# Patient Record
Sex: MALE | Race: WHITE | Employment: FULL TIME | URBAN - METROPOLITAN AREA
[De-identification: names, ages, dates, MRNs, and addresses within clinical notes are randomized per-mention and may not be internally consistent; named-entity substitution may affect disease eponyms.]

---

## 2017-08-31 ENCOUNTER — LABORATORY ENCOUNTER (OUTPATIENT)
Dept: LAB | Age: 37
End: 2017-08-31
Attending: INTERNAL MEDICINE
Payer: COMMERCIAL

## 2017-08-31 DIAGNOSIS — E10.9 DIABETES MELLITUS TYPE I (HCC): Primary | ICD-10-CM

## 2017-08-31 LAB
ALBUMIN SERPL-MCNC: 4.1 G/DL (ref 3.5–4.8)
ALP LIVER SERPL-CCNC: 80 U/L (ref 45–117)
ALT SERPL-CCNC: 23 U/L (ref 17–63)
AST SERPL-CCNC: 9 U/L (ref 15–41)
BASOPHILS # BLD AUTO: 0.05 X10(3) UL (ref 0–0.1)
BASOPHILS NFR BLD AUTO: 1.2 %
BILIRUB SERPL-MCNC: 0.7 MG/DL (ref 0.1–2)
BUN BLD-MCNC: 18 MG/DL (ref 8–20)
CALCIUM BLD-MCNC: 9.1 MG/DL (ref 8.3–10.3)
CHLORIDE: 105 MMOL/L (ref 101–111)
CHOLEST SMN-MCNC: 158 MG/DL (ref ?–200)
CO2: 26 MMOL/L (ref 22–32)
CREAT BLD-MCNC: 1 MG/DL (ref 0.7–1.3)
EOSINOPHIL # BLD AUTO: 0.12 X10(3) UL (ref 0–0.3)
EOSINOPHIL NFR BLD AUTO: 2.9 %
ERYTHROCYTE [DISTWIDTH] IN BLOOD BY AUTOMATED COUNT: 11.7 % (ref 11.5–16)
GLUCOSE BLD-MCNC: 258 MG/DL (ref 70–99)
HCT VFR BLD AUTO: 43.2 % (ref 37–53)
HDLC SERPL-MCNC: 60 MG/DL (ref 45–?)
HDLC SERPL: 2.63 {RATIO} (ref ?–4.97)
HGB BLD-MCNC: 15 G/DL (ref 13–17)
IMMATURE GRANULOCYTE COUNT: 0 X10(3) UL (ref 0–1)
IMMATURE GRANULOCYTE RATIO %: 0 %
LDLC SERPL CALC-MCNC: 73 MG/DL (ref ?–130)
LDLC SERPL-MCNC: 25 MG/DL (ref 5–40)
LYMPHOCYTES # BLD AUTO: 1.49 X10(3) UL (ref 0.9–4)
LYMPHOCYTES NFR BLD AUTO: 36.2 %
M PROTEIN MFR SERPL ELPH: 7.3 G/DL (ref 6.1–8.3)
MCH RBC QN AUTO: 32.2 PG (ref 27–33.2)
MCHC RBC AUTO-ENTMCNC: 34.7 G/DL (ref 31–37)
MCV RBC AUTO: 92.7 FL (ref 80–99)
MONOCYTES # BLD AUTO: 0.48 X10(3) UL (ref 0.1–0.6)
MONOCYTES NFR BLD AUTO: 11.7 %
NEUTROPHIL ABS PRELIM: 1.98 X10 (3) UL (ref 1.3–6.7)
NEUTROPHILS # BLD AUTO: 1.98 X10(3) UL (ref 1.3–6.7)
NEUTROPHILS NFR BLD AUTO: 48 %
NONHDLC SERPL-MCNC: 98 MG/DL (ref ?–130)
PLATELET # BLD AUTO: 189 10(3)UL (ref 150–450)
POTASSIUM SERPL-SCNC: 4.2 MMOL/L (ref 3.6–5.1)
RBC # BLD AUTO: 4.66 X10(6)UL (ref 4.3–5.7)
RED CELL DISTRIBUTION WIDTH-SD: 39.8 FL (ref 35.1–46.3)
SODIUM SERPL-SCNC: 139 MMOL/L (ref 136–144)
TRIGLYCERIDES: 126 MG/DL (ref ?–150)
WBC # BLD AUTO: 4.1 X10(3) UL (ref 4–13)

## 2017-08-31 PROCEDURE — 36415 COLL VENOUS BLD VENIPUNCTURE: CPT

## 2017-08-31 PROCEDURE — 84681 ASSAY OF C-PEPTIDE: CPT

## 2017-08-31 PROCEDURE — 83516 IMMUNOASSAY NONANTIBODY: CPT

## 2017-08-31 PROCEDURE — 85025 COMPLETE CBC W/AUTO DIFF WBC: CPT

## 2017-08-31 PROCEDURE — 80061 LIPID PANEL: CPT

## 2017-08-31 PROCEDURE — 80053 COMPREHEN METABOLIC PANEL: CPT

## 2017-08-31 PROCEDURE — 86341 ISLET CELL ANTIBODY: CPT

## 2017-09-02 LAB
C-PEPTIDE, SERUM OR PLASMA: 0.4 NG/ML
GLUTAMIC ACID DECARBOXYLASE AB: >250 IU/ML

## 2017-09-05 LAB — ISLET ANTIGEN-2 ANTIBODY: <0.8 U/ML

## 2018-07-06 ENCOUNTER — APPOINTMENT (OUTPATIENT)
Dept: GENERAL RADIOLOGY | Facility: HOSPITAL | Age: 38
End: 2018-07-06
Attending: EMERGENCY MEDICINE
Payer: COMMERCIAL

## 2018-07-06 ENCOUNTER — HOSPITAL ENCOUNTER (EMERGENCY)
Facility: HOSPITAL | Age: 38
Discharge: HOME | End: 2018-07-06
Attending: EMERGENCY MEDICINE
Payer: COMMERCIAL

## 2018-07-06 VITALS
DIASTOLIC BLOOD PRESSURE: 77 MMHG | TEMPERATURE: 99 F | RESPIRATION RATE: 20 BRPM | BODY MASS INDEX: 22.35 KG/M2 | OXYGEN SATURATION: 98 % | HEIGHT: 72 IN | WEIGHT: 165 LBS | HEART RATE: 77 BPM | SYSTOLIC BLOOD PRESSURE: 127 MMHG

## 2018-07-06 DIAGNOSIS — S83.92XA SPRAIN OF LEFT KNEE, UNSPECIFIED LIGAMENT, INITIAL ENCOUNTER: Primary | ICD-10-CM

## 2018-07-06 DIAGNOSIS — M25.462 KNEE EFFUSION, LEFT: ICD-10-CM

## 2018-07-06 DIAGNOSIS — S93.402A MODERATE LEFT ANKLE SPRAIN, INITIAL ENCOUNTER: ICD-10-CM

## 2018-07-06 PROCEDURE — 99283 EMERGENCY DEPT VISIT LOW MDM: CPT

## 2018-07-06 PROCEDURE — 73610 X-RAY EXAM OF ANKLE: CPT | Performed by: EMERGENCY MEDICINE

## 2018-07-06 PROCEDURE — 73560 X-RAY EXAM OF KNEE 1 OR 2: CPT | Performed by: EMERGENCY MEDICINE

## 2018-07-06 RX ORDER — IBUPROFEN 600 MG/1
600 TABLET ORAL ONCE
Status: COMPLETED | OUTPATIENT
Start: 2018-07-06 | End: 2018-07-06

## 2018-07-06 RX ORDER — LORAZEPAM 1 MG/1
1 TABLET ORAL 2 TIMES DAILY
COMMUNITY
End: 2019-04-26

## 2018-07-06 RX ORDER — HYDROCODONE BITARTRATE AND ACETAMINOPHEN 5; 325 MG/1; MG/1
1-2 TABLET ORAL EVERY 4 HOURS PRN
Qty: 10 TABLET | Refills: 0 | Status: SHIPPED | OUTPATIENT
Start: 2018-07-06 | End: 2018-07-13

## 2018-07-06 RX ORDER — HYDROCODONE BITARTRATE AND ACETAMINOPHEN 5; 325 MG/1; MG/1
1 TABLET ORAL ONCE
Status: COMPLETED | OUTPATIENT
Start: 2018-07-06 | End: 2018-07-06

## 2018-07-06 RX ORDER — INSULIN LISPRO 100 [IU]/ML
4 INJECTION, SOLUTION INTRAVENOUS; SUBCUTANEOUS
COMMUNITY

## 2018-07-06 RX ORDER — FOLIC ACID 1 MG/1
TABLET ORAL DAILY
COMMUNITY

## 2018-07-06 RX ORDER — THIAMINE MONONITRATE (VIT B1) 100 MG
100 TABLET ORAL DAILY
COMMUNITY

## 2018-07-06 NOTE — ED NOTES
Patient fitted with knee immobilizer and stirrup splint to left knee/ankle. Crutch fitting and teaching completed. Pt tolerated well.

## 2018-07-06 NOTE — ED PROVIDER NOTES
Patient Seen in: [facility] Emergency Department    History   Patient presents with:  Lower Extremity Injury (musculoskeletal)    Stated Complaint: Lower ext injury    HPI    Patient is a 61-year-old male who states last night he was carrying his 6- lateral tenderness. Tib-fib nontender however there is tenderness particularly lateral malleolus with swelling noted. No crepitus. Foot nontender. Good dorsalis pedis pulse. Able to dorsiflex and plantarflex. Sensation intact light touch.   SKIN: No r

## 2018-07-09 PROBLEM — M23.92 INTERNAL DERANGEMENT OF LEFT KNEE: Status: ACTIVE | Noted: 2018-07-09

## 2018-07-09 PROBLEM — S93.402A MODERATE LEFT ANKLE SPRAIN: Status: ACTIVE | Noted: 2018-07-09

## 2020-01-01 ENCOUNTER — HOSPITAL ENCOUNTER (OUTPATIENT)
Age: 40
Discharge: STILL ADMITTED OR ADMINISTRATIVE | End: 2020-01-01
Attending: EMERGENCY MEDICINE
Payer: COMMERCIAL

## 2021-03-03 PROBLEM — F41.9 ANXIETY: Status: ACTIVE | Noted: 2021-03-03

## 2021-03-03 PROBLEM — E10.65 UNCONTROLLED TYPE 1 DIABETES MELLITUS WITH HYPERGLYCEMIA (HCC): Status: ACTIVE | Noted: 2021-03-03

## 2022-08-03 NOTE — LETTER
Patient Education     Checking for Skin Cancer  You can find cancer early by checking your skin each month. There are 3 kinds of skin cancer. They are melanoma, basal cell carcinoma, and squamous cell carcinoma. Doing monthly skin checks is the best way to find new marks or skin changes. Follow the instructions below for checking your skin.   The ABCDEs of checking moles for melanoma   Check your moles or growths for signs of melanoma using ABCDE:   Asymmetry: the sides of the mole or growth don t match  Border: the edges are ragged, notched, or blurred  Color: the color within the mole or growth varies  Diameter: the mole or growth is larger than 6 mm (size of a pencil eraser)  Evolving: the size, shape, or color of the mole or growth is changing (evolving is not shown in the images below)    Checking for other types of skin cancer  Basal cell carcinoma or squamous cell carcinoma have symptoms such as:     A spot or mole that looks different from all other marks on your skin  Changes in how an area feels, such as itching, tenderness, or pain  Changes in the skin's surface, such as oozing, bleeding, or scaliness  A sore that does not heal  New swelling or redness beyond the border of a mole    Who s at risk?  Anyone can get skin cancer. But you are at greater risk if you have:   Fair skin, light-colored hair, or light-colored eyes  Many moles or abnormal moles on your skin  A history of sunburns from sunlight or tanning beds  A family history of skin cancer  A history of exposure to radiation or chemicals  A weakened immune system  If you have had skin cancer in the past, you are at risk for recurring skin cancer.   How to check your skin  Do your monthly skin checkups in front of a full-length mirror. Check all parts of your body, including your:   Head (ears, face, neck, and scalp)  Torso (front, back, and sides)  Arms (tops, undersides, upper, and lower armpits)  Hands (palms, backs, and fingers, including  [address]  Authorization for Surgical Operation and Procedure                                                                                           1. I hereby Josee Crawley MD, my physician and his/her assistants (if applicable), which may include medical students, residents, and/or fellows, to perform the following surgical operation/ procedure and administer such anesthesia as may be determined necessary by my physician: Operation/Procedure name (s) ESOPHAGOGASTRODUODENOSCOPY (EGD) on [address]   2. I recognize that during the surgical operation/procedure, unforeseen conditions may necessitate additional or different procedures than those listed above. I, therefore, further authorize and request that the above-named surgeon, assistants, or designees perform such procedures as are, in their judgment, necessary and desirable. 3.   My surgeon/physician has discussed prior to my surgery the potential benefits, risks and side effects of this procedure; the likelihood of achieving goals; and potential problems that might occur during recuperation. They also discussed reasonable alternatives to the procedure, including risks, benefits, and side effects related to the alternatives and risks related to not receiving this procedure. I have had all my questions answered and I acknowledge that no guarantee has been made as to the result that may be obtained. 4.   Should the need arise during my operation/procedure, which includes change of level of care prior to discharge, I also consent to the administration of blood and/or blood products. Further, I understand that despite careful testing and screening of blood or blood products by collecting agencies, I may still be subject to ill effects as a result of receiving a blood transfusion and/or blood products.   The following are some, but not all, of the potential risks that can occur: fever and allergic reactions, hemolytic reactions, transmission of diseases such as Hepatitis, AIDS and Cytomegalovirus (CMV) and fluid overload. In the event that I wish to have an autologous transfusion of my own blood, or a directed donor transfusion, I will discuss this with my physician. Check only if Refusing Blood or Blood Products  I understand refusal of blood or blood products as deemed necessary by my physician may have serious consequences to my condition to include possible death. I hereby assume responsibility for my refusal and release the hospital, its personnel, and my physicians from any responsibility for the consequences of my refusal.           ____ Refuse      5. I authorize the use of any specimen, organs, tissues, body parts or foreign objects that may be removed from my body during the operation/procedure for diagnosis, research or teaching purposes and their subsequent disposal by hospital authorities. I also authorize the release of specimen test results and/or written reports to my treating physician on the hospital medical staff or other referring or consulting physicians involved in my care, at the discretion of the Pathologist or my treating physician. 6.   I consent to the photographing or videotaping of the operations or procedures to be performed, including appropriate portions of my body for medical, scientific, or educational purposes, provided my identity is not revealed by the pictures or by descriptive texts accompanying them. If the procedure has been photographed/videotaped, the surgeon will obtain the original picture, image, videotape or CD. The hospital will not be responsible for storage, release or maintenance of the picture, image, tape or CD.    7.   I consent to the presence of a  or observers in the operating room as deemed necessary by my physician or their designees.     8.   I recognize that in the event my procedure results in extended under the nails)  Buttocks and genitals  Legs (front, back, and sides)  Feet (tops, soles, toes, including under the nails, and between toes)  If you have a lot of moles, take digital photos of them each month. Make sure to take photos both up close and from a distance. These can help you see if any moles change over time.   Most skin changes are not cancer. But if you see any changes in your skin, call your doctor right away. Only he or she can diagnose a problem. If you have skin cancer, seeing your doctor can be the first step toward getting the treatment that could save your life.   [employer] last reviewed this educational content on 4/1/2019 2000-2020 [employer]. [address] [place] [address], [place] [address]. All rights reserved. This information is not intended as a substitute for professional medical care. Always follow your healthcare professional's instructions.       When should I call my doctor?  If you are worsening or not improving, please, contact us or seek urgent care as noted below.     Who should I call with questions (adults)?  [facility] (adult and pediatric): [phone]  [facility] (adult): [phone]  For urgent needs outside of business hours call the [facility] at [phone] and ask for the dermatology resident on call to be paged  If this is a medical emergency and you are unable to reach an ER, Call [phone]    Who should I call with questions (pediatric)?  [facility]- Pediatric Dermatology  Dr. Ju Aponte, Dr. Autumn Canchola, Dr. Chandni Villatoro, NEDA Mason, Dr. Erika Barker, Dr. Zoë Lott & Dr. Lonnie Cabrera  Non-urgent nurse triage line; [phone]- Dominique and Afshan SAGE Care Coordinatoraide Dominguez (/Complex ) [phone]    If you need a prescription refill, please contact your pharmacy. Refills are approved or denied by our  Physicians during normal business hours, Monday through Fridays  Per office policy, refills will not be granted if you have not been seen within the past year (or sooner depending on your child's condition)    Scheduling Information:  Pediatric Appointment Scheduling and Call Center [phone]  Radiology Scheduling- [phone]  Sedation Unit Scheduling- [phone]  [place] Scheduling- General [phone]; Pediatric Dermatology [phone]  Main  Services: [phone]  Irish: [phone]  [place]er: [phone]  Hmong/Bhutanese/Frisian: [phone]  Preadmission Nursing Department Fax Number: [phone] (Fax all pre-operative paperwork to this number)    For urgent matters arising during evenings, weekends, or holidays that cannot wait for normal business hours please call [phone] and ask for the dermatology resident on call to be paged.           X-Ray/fluoroscopy time, I may develop a skin reaction. 9. If I have a Do Not Attempt Resuscitation (DNAR) order in place, that status will be suspended while in the operating room, procedural suite, and during the recovery period unless otherwise explicitly stated by me (or a person authorized to consent on my behalf). The surgeon or my attending physician will determine when the applicable recovery period ends for purposes of reinstating the DNAR order. 10. Patients having a sterilization procedure: I understand that if the procedure is successful the results will be permanent and it will therefore be impossible for me to inseminate, conceive, or bear children. I also understand that the procedure is intended to result in sterility, although the result has not been guaranteed. 11. I acknowledge that my physician has explained sedation/analgesia administration to me including the risk and benefits I consent to the administration of sedation/analgesia as may be necessary or desirable in the judgment of my physician. I CERTIFY THAT I HAVE READ AND FULLY UNDERSTAND THE ABOVE CONSENT TO OPERATION and/or OTHER PROCEDURE.     _________________________________________ _________________________________     ___________________________________  Signature of Patient     Signature of Responsible Person                   Printed Name of Responsible Person                              _________________________________________ ______________________________        ___________________________________  Signature of Witness         Date  Time         Relationship to Patient    STATEMENT OF PHYSICIAN My signature below affirms that prior to the time of the procedure; I have explained to the patient and/or his/her legal representative, the risks and benefits involved in the proposed treatment and any reasonable alternative to the proposed treatment.  I have also explained the risks and benefits involved in refusal of the proposed treatment and alternatives to the proposed treatment and have answered the patient's questions.  If I have a significant financial interest in a co-management agreement or a significant financial interest in any product or implant, or other significant relationship used in this procedure/surgery, I have disclosed this and had a discussion with my patient.     _______________________________________________________________ _____________________________  Ana Paula Cortes of Physician)                                                                                         (Date)                                   (Time)  Patient Name: Chao Carter    : 10/19/1980   Printed: 2022      Medical Record #: [id number]                                              Page 1 of 1

## 2022-11-05 ENCOUNTER — APPOINTMENT (OUTPATIENT)
Dept: CT IMAGING | Facility: HOSPITAL | Age: 42
End: 2022-11-05
Attending: EMERGENCY MEDICINE
Payer: COMMERCIAL

## 2022-11-05 ENCOUNTER — HOSPITAL ENCOUNTER (INPATIENT)
Facility: HOSPITAL | Age: 42
LOS: 2 days | Discharge: HOME | End: 2022-11-07
Attending: EMERGENCY MEDICINE | Admitting: INTERNAL MEDICINE
Payer: COMMERCIAL

## 2022-11-05 ENCOUNTER — HOSPITAL ENCOUNTER (OUTPATIENT)
Facility: HOSPITAL | Age: 42
Setting detail: OBSERVATION
Discharge: HOME | DRG: 392 | End: 2022-11-07
Attending: EMERGENCY MEDICINE | Admitting: INTERNAL MEDICINE
Payer: COMMERCIAL

## 2022-11-05 ENCOUNTER — APPOINTMENT (OUTPATIENT)
Dept: CT IMAGING | Facility: HOSPITAL | Age: 42
DRG: 392 | End: 2022-11-05
Attending: EMERGENCY MEDICINE
Payer: COMMERCIAL

## 2022-11-05 DIAGNOSIS — R10.9 INTRACTABLE ABDOMINAL PAIN: Primary | ICD-10-CM

## 2022-11-05 LAB
ALBUMIN SERPL-MCNC: 4.3 G/DL (ref 3.4–5)
ALBUMIN/GLOB SERPL: 1.3 {RATIO} (ref 1–2)
ALP LIVER SERPL-CCNC: 74 U/L
ALT SERPL-CCNC: 30 U/L
ANION GAP SERPL CALC-SCNC: 14 MMOL/L (ref 0–18)
AST SERPL-CCNC: 29 U/L (ref 15–37)
BASOPHILS # BLD AUTO: 0.06 X10(3) UL (ref 0–0.2)
BASOPHILS NFR BLD AUTO: 0.8 %
BILIRUB SERPL-MCNC: 0.5 MG/DL (ref 0.1–2)
BUN BLD-MCNC: 13 MG/DL (ref 7–18)
BUN/CREAT SERPL: 14 (ref 10–20)
CALCIUM BLD-MCNC: 9.4 MG/DL (ref 8.5–10.1)
CHLORIDE SERPL-SCNC: 104 MMOL/L (ref 98–112)
CO2 SERPL-SCNC: 23 MMOL/L (ref 21–32)
CREAT BLD-MCNC: 0.93 MG/DL
DEPRECATED RDW RBC AUTO: 39.1 FL (ref 35.1–46.3)
EOSINOPHIL # BLD AUTO: 0.16 X10(3) UL (ref 0–0.7)
EOSINOPHIL NFR BLD AUTO: 2.1 %
ERYTHROCYTE [DISTWIDTH] IN BLOOD BY AUTOMATED COUNT: 11.6 % (ref 11–15)
GFR SERPLBLD BASED ON 1.73 SQ M-ARVRAT: 106 ML/MIN/1.73M2 (ref 60–?)
GLOBULIN PLAS-MCNC: 3.3 G/DL (ref 2.8–4.4)
GLUCOSE BLD-MCNC: 119 MG/DL (ref 70–99)
GLUCOSE BLDC GLUCOMTR-MCNC: 191 MG/DL (ref 70–99)
GLUCOSE BLDC GLUCOMTR-MCNC: 220 MG/DL (ref 70–99)
HCT VFR BLD AUTO: 43.7 %
HGB BLD-MCNC: 15 G/DL
IMM GRANULOCYTES # BLD AUTO: 0.01 X10(3) UL (ref 0–1)
IMM GRANULOCYTES NFR BLD: 0.1 %
LACTATE SERPL-SCNC: 1.3 MMOL/L (ref 0.4–2)
LIPASE SERPL-CCNC: 133 U/L (ref 73–393)
LYMPHOCYTES # BLD AUTO: 1.67 X10(3) UL (ref 1–4)
LYMPHOCYTES NFR BLD AUTO: 22.2 %
MAGNESIUM SERPL-MCNC: 1.8 MG/DL (ref 1.6–2.6)
MCH RBC QN AUTO: 31.3 PG (ref 26–34)
MCHC RBC AUTO-ENTMCNC: 34.3 G/DL (ref 31–37)
MCV RBC AUTO: 91.2 FL
MONOCYTES # BLD AUTO: 0.71 X10(3) UL (ref 0.1–1)
MONOCYTES NFR BLD AUTO: 9.5 %
NEUTROPHILS # BLD AUTO: 4.9 X10 (3) UL (ref 1.5–7.7)
NEUTROPHILS # BLD AUTO: 4.9 X10(3) UL (ref 1.5–7.7)
NEUTROPHILS NFR BLD AUTO: 65.3 %
OSMOLALITY SERPL CALC.SUM OF ELEC: 293 MOSM/KG (ref 275–295)
PLATELET # BLD AUTO: 289 10(3)UL (ref 150–450)
POTASSIUM SERPL-SCNC: 3.4 MMOL/L (ref 3.5–5.1)
PROT SERPL-MCNC: 7.6 G/DL (ref 6.4–8.2)
RBC # BLD AUTO: 4.79 X10(6)UL
SARS-COV-2 RNA RESP QL NAA+PROBE: NOT DETECTED
SODIUM SERPL-SCNC: 141 MMOL/L (ref 136–145)
WBC # BLD AUTO: 7.5 X10(3) UL (ref 4–11)

## 2022-11-05 PROCEDURE — 74177 CT ABD & PELVIS W/CONTRAST: CPT | Performed by: EMERGENCY MEDICINE

## 2022-11-05 PROCEDURE — 99223 1ST HOSP IP/OBS HIGH 75: CPT | Performed by: INTERNAL MEDICINE

## 2022-11-05 RX ORDER — MAGNESIUM OXIDE 400 MG/1
400 TABLET ORAL ONCE
Status: COMPLETED | OUTPATIENT
Start: 2022-11-05 | End: 2022-11-05

## 2022-11-05 RX ORDER — SODIUM CHLORIDE, SODIUM LACTATE, POTASSIUM CHLORIDE, CALCIUM CHLORIDE 600; 310; 30; 20 MG/100ML; MG/100ML; MG/100ML; MG/100ML
INJECTION, SOLUTION INTRAVENOUS CONTINUOUS
Status: DISCONTINUED | OUTPATIENT
Start: 2022-11-05 | End: 2022-11-07

## 2022-11-05 RX ORDER — LORAZEPAM 2 MG/ML
2 INJECTION INTRAMUSCULAR
Status: DISCONTINUED | OUTPATIENT
Start: 2022-11-05 | End: 2022-11-07

## 2022-11-05 RX ORDER — MORPHINE SULFATE 4 MG/ML
6 INJECTION, SOLUTION INTRAMUSCULAR; INTRAVENOUS ONCE
Status: COMPLETED | OUTPATIENT
Start: 2022-11-05 | End: 2022-11-05

## 2022-11-05 RX ORDER — LORAZEPAM 1 MG/1
1 TABLET ORAL
Status: DISCONTINUED | OUTPATIENT
Start: 2022-11-05 | End: 2022-11-07

## 2022-11-05 RX ORDER — LORAZEPAM 1 MG/1
2 TABLET ORAL
Status: DISCONTINUED | OUTPATIENT
Start: 2022-11-05 | End: 2022-11-07

## 2022-11-05 RX ORDER — ACETAMINOPHEN 325 MG/1
650 TABLET ORAL EVERY 4 HOURS PRN
Status: DISCONTINUED | OUTPATIENT
Start: 2022-11-05 | End: 2022-11-07

## 2022-11-05 RX ORDER — HEPARIN SODIUM 5000 [USP'U]/ML
5000 INJECTION, SOLUTION INTRAVENOUS; SUBCUTANEOUS EVERY 8 HOURS SCHEDULED
Status: DISCONTINUED | OUTPATIENT
Start: 2022-11-05 | End: 2022-11-07

## 2022-11-05 RX ORDER — HYDROMORPHONE HYDROCHLORIDE 1 MG/ML
0.8 INJECTION, SOLUTION INTRAMUSCULAR; INTRAVENOUS; SUBCUTANEOUS EVERY 2 HOUR PRN
Status: DISCONTINUED | OUTPATIENT
Start: 2022-11-05 | End: 2022-11-07

## 2022-11-05 RX ORDER — HYDROCODONE BITARTRATE AND ACETAMINOPHEN 5; 325 MG/1; MG/1
2 TABLET ORAL EVERY 4 HOURS PRN
Status: DISCONTINUED | OUTPATIENT
Start: 2022-11-05 | End: 2022-11-07

## 2022-11-05 RX ORDER — HYDROMORPHONE HYDROCHLORIDE 1 MG/ML
0.4 INJECTION, SOLUTION INTRAMUSCULAR; INTRAVENOUS; SUBCUTANEOUS EVERY 2 HOUR PRN
Status: DISCONTINUED | OUTPATIENT
Start: 2022-11-05 | End: 2022-11-07

## 2022-11-05 RX ORDER — LORAZEPAM 1 MG/1
1 TABLET ORAL 2 TIMES DAILY
Status: DISCONTINUED | OUTPATIENT
Start: 2022-11-05 | End: 2022-11-07

## 2022-11-05 RX ORDER — HYDROCODONE BITARTRATE AND ACETAMINOPHEN 5; 325 MG/1; MG/1
1 TABLET ORAL EVERY 4 HOURS PRN
Status: DISCONTINUED | OUTPATIENT
Start: 2022-11-05 | End: 2022-11-07

## 2022-11-05 RX ORDER — NICOTINE POLACRILEX 4 MG
15 LOZENGE BUCCAL
Status: DISCONTINUED | OUTPATIENT
Start: 2022-11-05 | End: 2022-11-07

## 2022-11-05 RX ORDER — MORPHINE SULFATE 4 MG/ML
4 INJECTION, SOLUTION INTRAMUSCULAR; INTRAVENOUS ONCE
Status: COMPLETED | OUTPATIENT
Start: 2022-11-05 | End: 2022-11-05

## 2022-11-05 RX ORDER — NICOTINE POLACRILEX 4 MG
30 LOZENGE BUCCAL
Status: DISCONTINUED | OUTPATIENT
Start: 2022-11-05 | End: 2022-11-07

## 2022-11-05 RX ORDER — ONDANSETRON 2 MG/ML
4 INJECTION INTRAMUSCULAR; INTRAVENOUS EVERY 6 HOURS PRN
Status: DISCONTINUED | OUTPATIENT
Start: 2022-11-05 | End: 2022-11-07

## 2022-11-05 RX ORDER — FAMOTIDINE 10 MG/ML
20 INJECTION, SOLUTION INTRAVENOUS ONCE
Status: COMPLETED | OUTPATIENT
Start: 2022-11-05 | End: 2022-11-05

## 2022-11-05 RX ORDER — LORAZEPAM 2 MG/ML
1 INJECTION INTRAMUSCULAR
Status: DISCONTINUED | OUTPATIENT
Start: 2022-11-05 | End: 2022-11-07

## 2022-11-05 RX ORDER — HYDROMORPHONE HYDROCHLORIDE 1 MG/ML
0.2 INJECTION, SOLUTION INTRAMUSCULAR; INTRAVENOUS; SUBCUTANEOUS EVERY 2 HOUR PRN
Status: DISCONTINUED | OUTPATIENT
Start: 2022-11-05 | End: 2022-11-07

## 2022-11-05 RX ORDER — DEXTROSE MONOHYDRATE 25 G/50ML
50 INJECTION, SOLUTION INTRAVENOUS
Status: DISCONTINUED | OUTPATIENT
Start: 2022-11-05 | End: 2022-11-07

## 2022-11-05 RX ORDER — POTASSIUM CHLORIDE 20 MEQ/1
40 TABLET, EXTENDED RELEASE ORAL ONCE
Status: COMPLETED | OUTPATIENT
Start: 2022-11-05 | End: 2022-11-05

## 2022-11-05 RX ORDER — ONDANSETRON 2 MG/ML
4 INJECTION INTRAMUSCULAR; INTRAVENOUS ONCE
Status: COMPLETED | OUTPATIENT
Start: 2022-11-05 | End: 2022-11-05

## 2022-11-05 NOTE — ED QUICK NOTES
Orders for admission, patient is aware of plan and ready to go upstairs. Any questions, please call ED IAN Hazel  at extension [phone]  Type of Covid test sent:  Rapid pending    Titratable drug(s) infusing:  Rate:See MAR  Left FA 20    LOC at time of transport:  Ax[facility]

## 2022-11-05 NOTE — ED INITIAL ASSESSMENT (HPI)
Pt to ED /w c/o generalized abd pain that started today. Pt states he was cracking open a beer when symptoms began. Pt is axox4. Daily drinker. 2 beers daily weekdays, \"more on the weekends. \"

## 2022-11-06 LAB
ALBUMIN SERPL-MCNC: 3.3 G/DL (ref 3.4–5)
ALBUMIN/GLOB SERPL: 1 {RATIO} (ref 1–2)
ALP LIVER SERPL-CCNC: 62 U/L
ALT SERPL-CCNC: 24 U/L
ANION GAP SERPL CALC-SCNC: 10 MMOL/L (ref 0–18)
AST SERPL-CCNC: 18 U/L (ref 15–37)
BASOPHILS # BLD AUTO: 0.05 X10(3) UL (ref 0–0.2)
BASOPHILS NFR BLD AUTO: 0.4 %
BILIRUB SERPL-MCNC: 0.8 MG/DL (ref 0.1–2)
BUN BLD-MCNC: 19 MG/DL (ref 7–18)
BUN/CREAT SERPL: 17.3 (ref 10–20)
CALCIUM BLD-MCNC: 8.2 MG/DL (ref 8.5–10.1)
CHLORIDE SERPL-SCNC: 102 MMOL/L (ref 98–112)
CO2 SERPL-SCNC: 21 MMOL/L (ref 21–32)
CREAT BLD-MCNC: 1.1 MG/DL
DEPRECATED RDW RBC AUTO: 40.7 FL (ref 35.1–46.3)
EOSINOPHIL # BLD AUTO: 0 X10(3) UL (ref 0–0.7)
EOSINOPHIL NFR BLD AUTO: 0 %
ERYTHROCYTE [DISTWIDTH] IN BLOOD BY AUTOMATED COUNT: 11.9 % (ref 11–15)
GFR SERPLBLD BASED ON 1.73 SQ M-ARVRAT: 86 ML/MIN/1.73M2 (ref 60–?)
GLOBULIN PLAS-MCNC: 3.2 G/DL (ref 2.8–4.4)
GLUCOSE BLD-MCNC: 344 MG/DL (ref 70–99)
GLUCOSE BLDC GLUCOMTR-MCNC: 250 MG/DL (ref 70–99)
GLUCOSE BLDC GLUCOMTR-MCNC: 269 MG/DL (ref 70–99)
GLUCOSE BLDC GLUCOMTR-MCNC: 294 MG/DL (ref 70–99)
GLUCOSE BLDC GLUCOMTR-MCNC: 320 MG/DL (ref 70–99)
GLUCOSE BLDC GLUCOMTR-MCNC: 337 MG/DL (ref 70–99)
HCT VFR BLD AUTO: 48.3 %
HGB BLD-MCNC: 16.6 G/DL
IMM GRANULOCYTES # BLD AUTO: 0.06 X10(3) UL (ref 0–1)
IMM GRANULOCYTES NFR BLD: 0.4 %
INR BLD: 1.07 (ref 0.85–1.16)
LYMPHOCYTES # BLD AUTO: 0.82 X10(3) UL (ref 1–4)
LYMPHOCYTES NFR BLD AUTO: 6 %
MAGNESIUM SERPL-MCNC: 2.1 MG/DL (ref 1.6–2.6)
MCH RBC QN AUTO: 31.7 PG (ref 26–34)
MCHC RBC AUTO-ENTMCNC: 34.4 G/DL (ref 31–37)
MCV RBC AUTO: 92.4 FL
MONOCYTES # BLD AUTO: 0.99 X10(3) UL (ref 0.1–1)
MONOCYTES NFR BLD AUTO: 7.3 %
NEUTROPHILS # BLD AUTO: 11.66 X10 (3) UL (ref 1.5–7.7)
NEUTROPHILS # BLD AUTO: 11.66 X10(3) UL (ref 1.5–7.7)
NEUTROPHILS NFR BLD AUTO: 85.9 %
OSMOLALITY SERPL CALC.SUM OF ELEC: 292 MOSM/KG (ref 275–295)
PLATELET # BLD AUTO: 271 10(3)UL (ref 150–450)
POTASSIUM SERPL-SCNC: 5.1 MMOL/L (ref 3.5–5.1)
POTASSIUM SERPL-SCNC: 5.1 MMOL/L (ref 3.5–5.1)
PROT SERPL-MCNC: 6.5 G/DL (ref 6.4–8.2)
PROTHROMBIN TIME: 13.8 SECONDS (ref 11.6–14.8)
RBC # BLD AUTO: 5.23 X10(6)UL
SODIUM SERPL-SCNC: 133 MMOL/L (ref 136–145)
WBC # BLD AUTO: 13.6 X10(3) UL (ref 4–11)

## 2022-11-06 PROCEDURE — 99233 SBSQ HOSP IP/OBS HIGH 50: CPT | Performed by: INTERNAL MEDICINE

## 2022-11-06 RX ORDER — PANTOPRAZOLE SODIUM 40 MG/1
40 TABLET, DELAYED RELEASE ORAL
Status: DISCONTINUED | OUTPATIENT
Start: 2022-11-06 | End: 2022-11-07

## 2022-11-06 RX ORDER — METOCLOPRAMIDE HYDROCHLORIDE 5 MG/ML
10 INJECTION INTRAMUSCULAR; INTRAVENOUS EVERY 6 HOURS PRN
Status: DISCONTINUED | OUTPATIENT
Start: 2022-11-06 | End: 2022-11-07

## 2022-11-06 RX ORDER — CALCIUM CARBONATE 200(500)MG
500 TABLET,CHEWABLE ORAL 3 TIMES DAILY PRN
Status: DISCONTINUED | OUTPATIENT
Start: 2022-11-06 | End: 2022-11-07

## 2022-11-06 NOTE — PLAN OF CARE
Problem: Patient Centered Care  Goal: Patient preferences are identified and integrated in the patient's plan of care  Description: Interventions:  - What would you like us to know as we care for you?  I live at home with my wife  - Provide timely, complete, and accurate information to patient/family  - Incorporate patient and family knowledge, values, beliefs, and cultural backgrounds into the planning and delivery of care  - Encourage patient/family to participate in care and decision-making at the level they choose  - Honor patient and family perspectives and choices  Outcome: Progressing     Problem: Diabetes/Glucose Control  Goal: Glucose maintained within prescribed range  Description: INTERVENTIONS:  - Monitor Blood Glucose as ordered  - Assess for signs and symptoms of hyperglycemia and hypoglycemia  - Administer ordered medications to maintain glucose within target range  - Assess barriers to adequate nutritional intake and initiate nutrition consult as needed  - Instruct patient on self management of diabetes  Outcome: Progressing     Problem: Patient/Family Goals  Goal: Patient/Family Long Term Goal  Description: Patient's Long Term Goal: Don't get this pain anymore    Interventions:  - - Monitor vital signs  - Monitor appropriate labs  - Pain management as needed  - Administer medications per order  - Follow MD orders  - Diagnostics per order  - Fall precautions  - Activity as tolerated  - Assist with activities of daily living as needed  - Update / inform patient and family on plan of care  - Discharge planning   - See additional Care Plan goals for specific interventions  Outcome: Progressing  Goal: Patient/Family Short Term Goal  Description: Patient's Short Term Goal: Go home    Interventions:   - - Monitor vital signs  - Monitor appropriate labs  - Pain management as needed  - Administer medications per order  - Follow MD orders  - Diagnostics per order  - Fall precautions  - Activity as tolerated  - Assist with activities of daily living as needed  - Update / inform patient and family on plan of care   - See additional Care Plan goals for specific interventions  Outcome: Progressing     Problem: GASTROINTESTINAL - ADULT  Goal: Minimal or absence of nausea and vomiting  Description: INTERVENTIONS:  - Maintain adequate hydration with IV or PO as ordered and tolerated  - Nasogastric tube to low intermittent suction as ordered  - Evaluate effectiveness of ordered antiemetic medications  - Provide nonpharmacologic comfort measures as appropriate  - Advance diet as tolerated, if ordered  - Obtain nutritional consult as needed  - Evaluate fluid balance  Outcome: Progressing     Problem: METABOLIC/FLUID AND ELECTROLYTES - ADULT  Goal: Glucose maintained within prescribed range  Description: INTERVENTIONS:  - Monitor Blood Glucose as ordered  - Assess for signs and symptoms of hyperglycemia and hypoglycemia  - Administer ordered medications to maintain glucose within target range  - Assess barriers to adequate nutritional intake and initiate nutrition consult as needed  - Instruct patient on self management of diabetes  Outcome: Progressing  Goal: Electrolytes maintained within normal limits  Description: INTERVENTIONS:  - Monitor labs and rhythm and assess patient for signs and symptoms of electrolyte imbalances  - Administer electrolyte replacement as ordered  - Monitor response to electrolyte replacements, including rhythm and repeat lab results as appropriate  - Fluid restriction as ordered  - Instruct patient on fluid and nutrition restrictions as appropriate  Outcome: Progressing     Problem: PAIN - ADULT  Goal: Verbalizes/displays adequate comfort level or patient's stated pain goal  Description: INTERVENTIONS:  - Encourage pt to monitor pain and request assistance  - Assess pain using appropriate pain scale  - Administer analgesics based on type and severity of pain and evaluate response  - Implement non-pharmacological measures as appropriate and evaluate response  - Consider cultural and social influences on pain and pain management  - Manage/alleviate anxiety  - Utilize distraction and/or relaxation techniques  - Monitor for opioid side effects  - Notify MD/LIP if interventions unsuccessful or patient reports new pain  - Anticipate increased pain with activity and pre-medicate as appropriate  Outcome: Progressing     Problem: RISK FOR INFECTION - ADULT  Goal: Absence of fever/infection during anticipated neutropenic period  Description: INTERVENTIONS  - Monitor WBC  - Administer growth factors as ordered  - Implement neutropenic guidelines  Outcome: Progressing     Problem: SAFETY ADULT - FALL  Goal: Free from fall injury  Description: INTERVENTIONS:  - Assess pt frequently for physical needs  - Identify cognitive and physical deficits and behaviors that affect risk of falls.   - [place] fall precautions as indicated by assessment.  - Educate pt/family on patient safety including physical limitations  - Instruct pt to call for assistance with activity based on assessment  - Modify environment to reduce risk of injury  - Provide assistive devices as appropriate  - Consider OT/PT consult to assist with strengthening/mobility  - Encourage toileting schedule  Outcome: Progressing     Problem: DISCHARGE PLANNING  Goal: Discharge to home or other facility with appropriate resources  Description: INTERVENTIONS:  - Identify barriers to discharge w/pt and caregiver  - Include patient/family/discharge partner in discharge planning  - Arrange for needed discharge resources and transportation as appropriate  - Identify discharge learning needs (meds, wound care, etc)  - Arrange for interpreters to assist at discharge as needed  - Consider post-discharge preferences of patient/family/discharge partner  - Complete POLST form as appropriate  - Assess patient's ability to be responsible for managing their own health  - Refer to Case Management Department for coordinating discharge planning if the patient needs post-hospital services based on physician/LIP order or complex needs related to functional status, cognitive ability or social support system  Outcome: Progressing   Patient currently stable at this time. Vitals stable. Complains of pain, as needed pain medication given, tolerated well. Fall and safety precautions in place. Bed at lowest position, bed alarm in place. Spouse at bedside. Call light and personal belongings within reach. Frequent nursing rounds completed.

## 2022-11-07 ENCOUNTER — ANESTHESIA EVENT (OUTPATIENT)
Dept: ENDOSCOPY | Facility: HOSPITAL | Age: 42
End: 2022-11-07
Payer: COMMERCIAL

## 2022-11-07 ENCOUNTER — HOSPITAL ENCOUNTER (INPATIENT)
Facility: HOSPITAL | Age: 42
LOS: 19 days | Discharge: HOME | DRG: 329 | End: 2022-11-29
Attending: EMERGENCY MEDICINE | Admitting: HOSPITALIST
Payer: COMMERCIAL

## 2022-11-07 ENCOUNTER — HOSPITAL ENCOUNTER (INPATIENT)
Facility: HOSPITAL | Age: 42
LOS: 19 days | Discharge: HOME | End: 2022-11-29
Attending: EMERGENCY MEDICINE | Admitting: HOSPITALIST
Payer: COMMERCIAL

## 2022-11-07 ENCOUNTER — ANESTHESIA (OUTPATIENT)
Dept: ENDOSCOPY | Facility: HOSPITAL | Age: 42
End: 2022-11-07
Payer: COMMERCIAL

## 2022-11-07 VITALS
SYSTOLIC BLOOD PRESSURE: 113 MMHG | BODY MASS INDEX: 22.69 KG/M2 | DIASTOLIC BLOOD PRESSURE: 70 MMHG | OXYGEN SATURATION: 95 % | TEMPERATURE: 98 F | HEART RATE: 77 BPM | WEIGHT: 167.5 LBS | RESPIRATION RATE: 15 BRPM | HEIGHT: 72 IN

## 2022-11-07 DIAGNOSIS — L02.91 ABSCESS: ICD-10-CM

## 2022-11-07 DIAGNOSIS — R10.9 INTRACTABLE ABDOMINAL PAIN: ICD-10-CM

## 2022-11-07 DIAGNOSIS — K31.84 GASTROPARESIS: Primary | ICD-10-CM

## 2022-11-07 LAB
ALBUMIN SERPL-MCNC: 3.1 G/DL (ref 3.4–5)
ALBUMIN SERPL-MCNC: 3.4 G/DL (ref 3.4–5)
ALBUMIN/GLOB SERPL: 1 {RATIO} (ref 1–2)
ALP LIVER SERPL-CCNC: 53 U/L
ALP LIVER SERPL-CCNC: 54 U/L
ALT SERPL-CCNC: 19 U/L
ALT SERPL-CCNC: 22 U/L
ANION GAP SERPL CALC-SCNC: 11 MMOL/L (ref 0–18)
ANION GAP SERPL CALC-SCNC: 6 MMOL/L (ref 0–18)
AST SERPL-CCNC: 12 U/L (ref 15–37)
AST SERPL-CCNC: 20 U/L (ref 15–37)
BASOPHILS # BLD AUTO: 0.06 X10(3) UL (ref 0–0.2)
BASOPHILS # BLD AUTO: 0.06 X10(3) UL (ref 0–0.2)
BASOPHILS NFR BLD AUTO: 0.6 %
BASOPHILS NFR BLD AUTO: 0.6 %
BILIRUB DIRECT SERPL-MCNC: 0.2 MG/DL (ref 0–0.2)
BILIRUB SERPL-MCNC: 0.8 MG/DL (ref 0.1–2)
BILIRUB SERPL-MCNC: 0.8 MG/DL (ref 0.1–2)
BUN BLD-MCNC: 15 MG/DL (ref 7–18)
BUN BLD-MCNC: 18 MG/DL (ref 7–18)
BUN/CREAT SERPL: 16.1 (ref 10–20)
BUN/CREAT SERPL: 16.8 (ref 10–20)
CALCIUM BLD-MCNC: 8.5 MG/DL (ref 8.5–10.1)
CALCIUM BLD-MCNC: 8.8 MG/DL (ref 8.5–10.1)
CHLORIDE SERPL-SCNC: 98 MMOL/L (ref 98–112)
CHLORIDE SERPL-SCNC: 98 MMOL/L (ref 98–112)
CO2 SERPL-SCNC: 26 MMOL/L (ref 21–32)
CO2 SERPL-SCNC: 30 MMOL/L (ref 21–32)
CREAT BLD-MCNC: 0.93 MG/DL
CREAT BLD-MCNC: 1.07 MG/DL
DEPRECATED RDW RBC AUTO: 39.3 FL (ref 35.1–46.3)
DEPRECATED RDW RBC AUTO: 39.8 FL (ref 35.1–46.3)
EOSINOPHIL # BLD AUTO: 0.09 X10(3) UL (ref 0–0.7)
EOSINOPHIL # BLD AUTO: 0.1 X10(3) UL (ref 0–0.7)
EOSINOPHIL NFR BLD AUTO: 0.9 %
EOSINOPHIL NFR BLD AUTO: 1 %
ERYTHROCYTE [DISTWIDTH] IN BLOOD BY AUTOMATED COUNT: 11.5 % (ref 11–15)
ERYTHROCYTE [DISTWIDTH] IN BLOOD BY AUTOMATED COUNT: 11.9 % (ref 11–15)
GFR SERPLBLD BASED ON 1.73 SQ M-ARVRAT: 106 ML/MIN/1.73M2 (ref 60–?)
GFR SERPLBLD BASED ON 1.73 SQ M-ARVRAT: 89 ML/MIN/1.73M2 (ref 60–?)
GLOBULIN PLAS-MCNC: 3.2 G/DL (ref 2.8–4.4)
GLUCOSE BLD-MCNC: 259 MG/DL (ref 70–99)
GLUCOSE BLD-MCNC: 267 MG/DL (ref 70–99)
GLUCOSE BLDC GLUCOMTR-MCNC: 164 MG/DL (ref 70–99)
GLUCOSE BLDC GLUCOMTR-MCNC: 165 MG/DL (ref 70–99)
GLUCOSE BLDC GLUCOMTR-MCNC: 210 MG/DL (ref 70–99)
HCT VFR BLD AUTO: 43.2 %
HCT VFR BLD AUTO: 44.4 %
HGB BLD-MCNC: 15 G/DL
HGB BLD-MCNC: 15.4 G/DL
IMM GRANULOCYTES # BLD AUTO: 0.04 X10(3) UL (ref 0–1)
IMM GRANULOCYTES # BLD AUTO: 0.05 X10(3) UL (ref 0–1)
IMM GRANULOCYTES NFR BLD: 0.4 %
IMM GRANULOCYTES NFR BLD: 0.5 %
LIPASE SERPL-CCNC: 144 U/L (ref 73–393)
LYMPHOCYTES # BLD AUTO: 0.89 X10(3) UL (ref 1–4)
LYMPHOCYTES # BLD AUTO: 1.35 X10(3) UL (ref 1–4)
LYMPHOCYTES NFR BLD AUTO: 13 %
LYMPHOCYTES NFR BLD AUTO: 9.3 %
MCH RBC QN AUTO: 31.9 PG (ref 26–34)
MCH RBC QN AUTO: 32 PG (ref 26–34)
MCHC RBC AUTO-ENTMCNC: 34.7 G/DL (ref 31–37)
MCHC RBC AUTO-ENTMCNC: 34.7 G/DL (ref 31–37)
MCV RBC AUTO: 91.9 FL
MCV RBC AUTO: 92.1 FL
MONOCYTES # BLD AUTO: 0.91 X10(3) UL (ref 0.1–1)
MONOCYTES # BLD AUTO: 1.35 X10(3) UL (ref 0.1–1)
MONOCYTES NFR BLD AUTO: 13 %
MONOCYTES NFR BLD AUTO: 9.5 %
NEUTROPHILS # BLD AUTO: 7.46 X10 (3) UL (ref 1.5–7.7)
NEUTROPHILS # BLD AUTO: 7.46 X10(3) UL (ref 1.5–7.7)
NEUTROPHILS # BLD AUTO: 7.58 X10 (3) UL (ref 1.5–7.7)
NEUTROPHILS # BLD AUTO: 7.58 X10(3) UL (ref 1.5–7.7)
NEUTROPHILS NFR BLD AUTO: 72 %
NEUTROPHILS NFR BLD AUTO: 79.2 %
OSMOLALITY SERPL CALC.SUM OF ELEC: 289 MOSM/KG (ref 275–295)
OSMOLALITY SERPL CALC.SUM OF ELEC: 290 MOSM/KG (ref 275–295)
PLATELET # BLD AUTO: 228 10(3)UL (ref 150–450)
PLATELET # BLD AUTO: 247 10(3)UL (ref 150–450)
POTASSIUM SERPL-SCNC: 4.6 MMOL/L (ref 3.5–5.1)
POTASSIUM SERPL-SCNC: 4.8 MMOL/L (ref 3.5–5.1)
PROT SERPL-MCNC: 6.3 G/DL (ref 6.4–8.2)
PROT SERPL-MCNC: 6.8 G/DL (ref 6.4–8.2)
RBC # BLD AUTO: 4.69 X10(6)UL
RBC # BLD AUTO: 4.83 X10(6)UL
SODIUM SERPL-SCNC: 134 MMOL/L (ref 136–145)
SODIUM SERPL-SCNC: 135 MMOL/L (ref 136–145)
WBC # BLD AUTO: 10.4 X10(3) UL (ref 4–11)
WBC # BLD AUTO: 9.6 X10(3) UL (ref 4–11)

## 2022-11-07 PROCEDURE — 0DB78ZX EXCISION OF STOMACH, PYLORUS, VIA NATURAL OR ARTIFICIAL OPENING ENDOSCOPIC, DIAGNOSTIC: ICD-10-PCS | Performed by: INTERNAL MEDICINE

## 2022-11-07 PROCEDURE — 0DB68ZX EXCISION OF STOMACH, VIA NATURAL OR ARTIFICIAL OPENING ENDOSCOPIC, DIAGNOSTIC: ICD-10-PCS | Performed by: INTERNAL MEDICINE

## 2022-11-07 PROCEDURE — 43239 EGD BIOPSY SINGLE/MULTIPLE: CPT | Performed by: INTERNAL MEDICINE

## 2022-11-07 PROCEDURE — 99239 HOSP IP/OBS DSCHRG MGMT >30: CPT | Performed by: INTERNAL MEDICINE

## 2022-11-07 PROCEDURE — 0DB98ZX EXCISION OF DUODENUM, VIA NATURAL OR ARTIFICIAL OPENING ENDOSCOPIC, DIAGNOSTIC: ICD-10-PCS | Performed by: INTERNAL MEDICINE

## 2022-11-07 PROCEDURE — 99245 OFF/OP CONSLTJ NEW/EST HI 55: CPT | Performed by: INTERNAL MEDICINE

## 2022-11-07 RX ORDER — FOLIC ACID 1 MG/1
1 TABLET ORAL DAILY
Status: DISCONTINUED | OUTPATIENT
Start: 2022-11-08 | End: 2022-11-07

## 2022-11-07 RX ORDER — NALOXONE HYDROCHLORIDE 4 MG/.1ML
4 SPRAY NASAL AS NEEDED
Qty: 1 KIT | Refills: 0 | Status: SHIPPED | OUTPATIENT
Start: 2022-11-07

## 2022-11-07 RX ORDER — SODIUM CHLORIDE, SODIUM LACTATE, POTASSIUM CHLORIDE, CALCIUM CHLORIDE 600; 310; 30; 20 MG/100ML; MG/100ML; MG/100ML; MG/100ML
INJECTION, SOLUTION INTRAVENOUS CONTINUOUS
Status: DISCONTINUED | OUTPATIENT
Start: 2022-11-07 | End: 2022-11-07

## 2022-11-07 RX ORDER — PANTOPRAZOLE SODIUM 40 MG/1
40 TABLET, DELAYED RELEASE ORAL
Qty: 30 TABLET | Refills: 0 | Status: SHIPPED | OUTPATIENT
Start: 2022-11-08 | End: 2022-12-13

## 2022-11-07 RX ORDER — DOXEPIN HYDROCHLORIDE 50 MG/1
1 CAPSULE ORAL DAILY
Status: DISCONTINUED | OUTPATIENT
Start: 2022-11-08 | End: 2022-11-07

## 2022-11-07 RX ORDER — KETOROLAC TROMETHAMINE 15 MG/ML
15 INJECTION, SOLUTION INTRAMUSCULAR; INTRAVENOUS ONCE
Status: COMPLETED | OUTPATIENT
Start: 2022-11-07 | End: 2022-11-07

## 2022-11-07 RX ORDER — OXYCODONE HYDROCHLORIDE 5 MG/1
5 TABLET ORAL EVERY 4 HOURS PRN
Status: DISCONTINUED | OUTPATIENT
Start: 2022-11-07 | End: 2022-11-07

## 2022-11-07 RX ORDER — GLYCOPYRROLATE 0.2 MG/ML
INJECTION, SOLUTION INTRAMUSCULAR; INTRAVENOUS AS NEEDED
Status: DISCONTINUED | OUTPATIENT
Start: 2022-11-07 | End: 2022-11-07 | Stop reason: SURG

## 2022-11-07 RX ORDER — LIDOCAINE HYDROCHLORIDE 10 MG/ML
INJECTION, SOLUTION EPIDURAL; INFILTRATION; INTRACAUDAL; PERINEURAL AS NEEDED
Status: DISCONTINUED | OUTPATIENT
Start: 2022-11-07 | End: 2022-11-07 | Stop reason: SURG

## 2022-11-07 RX ORDER — POLYETHYLENE GLYCOL 3350 17 G/17G
17 POWDER, FOR SOLUTION ORAL 2 TIMES DAILY
Status: DISCONTINUED | OUTPATIENT
Start: 2022-11-07 | End: 2022-11-07

## 2022-11-07 RX ORDER — NALOXONE HYDROCHLORIDE 0.4 MG/ML
80 INJECTION, SOLUTION INTRAMUSCULAR; INTRAVENOUS; SUBCUTANEOUS AS NEEDED
Status: DISCONTINUED | OUTPATIENT
Start: 2022-11-07 | End: 2022-11-07 | Stop reason: HOSPADM

## 2022-11-07 RX ORDER — OXYCODONE HYDROCHLORIDE 5 MG/1
5 TABLET ORAL EVERY 6 HOURS PRN
Qty: 10 TABLET | Refills: 0 | Status: SHIPPED | OUTPATIENT
Start: 2022-11-07

## 2022-11-07 RX ORDER — MELATONIN
100 DAILY
Status: DISCONTINUED | OUTPATIENT
Start: 2022-11-08 | End: 2022-11-07

## 2022-11-07 RX ORDER — DIPHENHYDRAMINE HYDROCHLORIDE 50 MG/ML
12.5 INJECTION INTRAMUSCULAR; INTRAVENOUS ONCE
Status: COMPLETED | OUTPATIENT
Start: 2022-11-07 | End: 2022-11-07

## 2022-11-07 RX ORDER — METOCLOPRAMIDE HYDROCHLORIDE 5 MG/ML
5 INJECTION INTRAMUSCULAR; INTRAVENOUS ONCE
Status: COMPLETED | OUTPATIENT
Start: 2022-11-07 | End: 2022-11-07

## 2022-11-07 RX ADMIN — LIDOCAINE HYDROCHLORIDE 60 MG: 10 INJECTION, SOLUTION EPIDURAL; INFILTRATION; INTRACAUDAL; PERINEURAL at 14:06:00

## 2022-11-07 RX ADMIN — GLYCOPYRROLATE 0.2 MG: 0.2 INJECTION, SOLUTION INTRAMUSCULAR; INTRAVENOUS at 14:04:00

## 2022-11-07 NOTE — INTERVAL H&P NOTE
Pre-op Diagnosis: nausea; vomiting; abdominal pain    The above referenced H&P was reviewed by Kira Steve MD on 11/7/2022, the patient was examined and no significant changes have occurred in the patient's condition since the H&P was performed. I discussed with the patient and/or legal representative the potential benefits, risks and side effects of this procedure; the likelihood of the patient achieving goals; and potential problems that might occur during recuperation. I discussed reasonable alternatives to the procedure, including risks, benefits and side effects related to the alternatives and risks related to not receiving this procedure. We will proceed with procedure as planned. Patient/Caregiver provided printed discharge information.

## 2022-11-07 NOTE — PLAN OF CARE
LAUREL Peralta. Patient remains independent. Wife present at bedside. Patient and wife both updated on plan of care. Status post, EGD today. Continues on IV fluids. CIWAs discontinued. No s/s of withdrawls. Patient tolerated diet well, no c/o pain or nausea. Plan for patient to discharged home. IV removed by this RN. Discharge education and paperwork provided to patient and wife. All questions answered. All belongings gathered. Safety precautions maintained. Patient discharged home with wife. Problem: Patient Centered Care  Goal: Patient preferences are identified and integrated in the patient's plan of care  Description: Interventions:  - What would you like us to know as we care for you?  I live at home with my wife  - Provide timely, complete, and accurate information to patient/family  - Incorporate patient and family knowledge, values, beliefs, and cultural backgrounds into the planning and delivery of care  - Encourage patient/family to participate in care and decision-making at the level they choose  - Honor patient and family perspectives and choices  11/7/2022 1716 by Darby Knutson RN  Outcome: Adequate for Discharge  11/7/2022 1145 by Darby Knutson RN  Outcome: Progressing     Problem: Diabetes/Glucose Control  Goal: Glucose maintained within prescribed range  Description: INTERVENTIONS:  - Monitor Blood Glucose as ordered  - Assess for signs and symptoms of hyperglycemia and hypoglycemia  - Administer ordered medications to maintain glucose within target range  - Assess barriers to adequate nutritional intake and initiate nutrition consult as needed  - Instruct patient on self management of diabetes  11/7/2022 1716 by Darby Knutson RN  Outcome: Adequate for Discharge  11/7/2022 1145 by Darby Knutson RN  Outcome: Progressing     Problem: Patient/Family Goals  Goal: Patient/Family Long Term Goal  Description: Patient's Long Term Goal: Don't get this pain anymore    Interventions:  - - Monitor vital signs  - Monitor appropriate labs  - Pain management as needed  - Administer medications per order  - Follow MD orders  - Diagnostics per order  - Fall precautions  - Activity as tolerated  - Assist with activities of daily living as needed  - Update / inform patient and family on plan of care  - Discharge planning   - See additional Care Plan goals for specific interventions  11/7/2022 1716 by Reyna Payton RN  Outcome: Adequate for Discharge  11/7/2022 1145 by Reyna Payton RN  Outcome: Progressing  Goal: Patient/Family Short Term Goal  Description: Patient's Short Term Goal: Go home    Interventions:   - - Monitor vital signs  - Monitor appropriate labs  - Pain management as needed  - Administer medications per order  - Follow MD orders  - Diagnostics per order  - Fall precautions  - Activity as tolerated  - Assist with activities of daily living as needed  - Update / inform patient and family on plan of care   - See additional Care Plan goals for specific interventions  11/7/2022 1716 by Reyna Payton RN  Outcome: Adequate for Discharge  11/7/2022 1145 by Reyna Payton RN  Outcome: Progressing     Problem: GASTROINTESTINAL - ADULT  Goal: Minimal or absence of nausea and vomiting  Description: INTERVENTIONS:  - Maintain adequate hydration with IV or PO as ordered and tolerated  - Nasogastric tube to low intermittent suction as ordered  - Evaluate effectiveness of ordered antiemetic medications  - Provide nonpharmacologic comfort measures as appropriate  - Advance diet as tolerated, if ordered  - Obtain nutritional consult as needed  - Evaluate fluid balance  11/7/2022 1716 by Reyna Payton RN  Outcome: Adequate for Discharge  11/7/2022 1145 by Reyna Payton RN  Outcome: Progressing     Problem: METABOLIC/FLUID AND ELECTROLYTES - ADULT  Goal: Glucose maintained within prescribed range  Description: INTERVENTIONS:  - Monitor Blood Glucose as ordered  - Assess for signs and symptoms of hyperglycemia and hypoglycemia  - Administer ordered medications to maintain glucose within target range  - Assess barriers to adequate nutritional intake and initiate nutrition consult as needed  - Instruct patient on self management of diabetes  11/7/2022 1716 by Glenys Iqbal RN  Outcome: Adequate for Discharge  11/7/2022 1145 by Glenys Iqbal RN  Outcome: Progressing  Goal: Electrolytes maintained within normal limits  Description: INTERVENTIONS:  - Monitor labs and rhythm and assess patient for signs and symptoms of electrolyte imbalances  - Administer electrolyte replacement as ordered  - Monitor response to electrolyte replacements, including rhythm and repeat lab results as appropriate  - Fluid restriction as ordered  - Instruct patient on fluid and nutrition restrictions as appropriate  11/7/2022 1716 by Glenys Iqbal RN  Outcome: Adequate for Discharge  11/7/2022 1145 by Glenys Iqbal RN  Outcome: Progressing     Problem: PAIN - ADULT  Goal: Verbalizes/displays adequate comfort level or patient's stated pain goal  Description: INTERVENTIONS:  - Encourage pt to monitor pain and request assistance  - Assess pain using appropriate pain scale  - Administer analgesics based on type and severity of pain and evaluate response  - Implement non-pharmacological measures as appropriate and evaluate response  - Consider cultural and social influences on pain and pain management  - Manage/alleviate anxiety  - Utilize distraction and/or relaxation techniques  - Monitor for opioid side effects  - Notify MD/LIP if interventions unsuccessful or patient reports new pain  - Anticipate increased pain with activity and pre-medicate as appropriate  11/7/2022 1716 by Glenys Iqbal RN  Outcome: Adequate for Discharge  11/7/2022 1145 by Glenys Iqbal RN  Outcome: Progressing     Problem: RISK FOR INFECTION - ADULT  Goal: Absence of fever/infection during anticipated neutropenic period  Description: INTERVENTIONS  - Monitor WBC  - Administer growth factors as ordered  - Implement neutropenic guidelines  11/7/2022 1716 by Dipak Chandler RN  Outcome: Adequate for Discharge  11/7/2022 1145 by Dipak Chandler RN  Outcome: Progressing     Problem: SAFETY ADULT - FALL  Goal: Free from fall injury  Description: INTERVENTIONS:  - Assess pt frequently for physical needs  - Identify cognitive and physical deficits and behaviors that affect risk of falls.   - [place] fall precautions as indicated by assessment.  - Educate pt/family on patient safety including physical limitations  - Instruct pt to call for assistance with activity based on assessment  - Modify environment to reduce risk of injury  - Provide assistive devices as appropriate  - Consider OT/PT consult to assist with strengthening/mobility  - Encourage toileting schedule  11/7/2022 1716 by Dipak Chandler RN  Outcome: Adequate for Discharge  11/7/2022 1145 by Dipak Chandler RN  Outcome: Progressing     Problem: DISCHARGE PLANNING  Goal: Discharge to home or other facility with appropriate resources  Description: INTERVENTIONS:  - Identify barriers to discharge w/pt and caregiver  - Include patient/family/discharge partner in discharge planning  - Arrange for needed discharge resources and transportation as appropriate  - Identify discharge learning needs (meds, wound care, etc)  - Arrange for interpreters to assist at discharge as needed  - Consider post-discharge preferences of patient/family/discharge partner  - Complete POLST form as appropriate  - Assess patient's ability to be responsible for managing their own health  - Refer to Case Management Department for coordinating discharge planning if the patient needs post-hospital services based on physician/LIP order or complex needs related to functional status, cognitive ability or social support system  11/7/2022 1716 by Dipak Chandler RN  Outcome: Adequate for Discharge  11/7/2022 1145 by Master Smith, RN  Outcome: Progressing

## 2022-11-07 NOTE — BH PROGRESS NOTE
[facility] was trigger consulted with CIWA order set and per discussion with RN and chart review Alexis's CIWA score has remained < 6 for the entirety of his admission, is reportedly not interested in stopping alcohol use, and has no safety or behavioral concerns. NO BAL/UDS DONE IN ED. Per chart review and RN report Phil Cook is drinking 2 beers on weekdays and 4 beers on weekends. PHQ-4 = 0, CSSR = 0, INDICATING LOW RISK. [facility] explored Alexis's insurance plan and he is NOT in network with [facility] as he has an HMO through [address] [place] [address]. [facility] provided in network resources along with AA and [facility] information in Alexis's discharge instructions in the event he decides to engage in treatment in the future. [facility] will sign off. Deepa EMERSON LPC    Note to patient:  The [facility] makes medical notes like these available to patients in the interest of transparency. However, be advised this is a medical document. It is intended as peer to peer communication. It is written in medical language and may contain abbreviations or verbiage that are unfamiliar. It may appear blunt or direct. Medical documents are intended to carry relevant information, facts as evident, and the clinical opinion of the practitioner.

## 2022-11-07 NOTE — PLAN OF CARE
Problem: Patient Centered Care  Goal: Patient preferences are identified and integrated in the patient's plan of care  Description: Interventions:  - What would you like us to know as we care for you?  I live at home with my wife  - Provide timely, complete, and accurate information to patient/family  - Incorporate patient and family knowledge, values, beliefs, and cultural backgrounds into the planning and delivery of care  - Encourage patient/family to participate in care and decision-making at the level they choose  - Honor patient and family perspectives and choices  Outcome: Progressing     Problem: Diabetes/Glucose Control  Goal: Glucose maintained within prescribed range  Description: INTERVENTIONS:  - Monitor Blood Glucose as ordered  - Assess for signs and symptoms of hyperglycemia and hypoglycemia  - Administer ordered medications to maintain glucose within target range  - Assess barriers to adequate nutritional intake and initiate nutrition consult as needed  - Instruct patient on self management of diabetes  Outcome: Progressing     Problem: Patient/Family Goals  Goal: Patient/Family Long Term Goal  Description: Patient's Long Term Goal: Don't get this pain anymore    Interventions:  - - Monitor vital signs  - Monitor appropriate labs  - Pain management as needed  - Administer medications per order  - Follow MD orders  - Diagnostics per order  - Fall precautions  - Activity as tolerated  - Assist with activities of daily living as needed  - Update / inform patient and family on plan of care  - Discharge planning   - See additional Care Plan goals for specific interventions  Outcome: Progressing  Goal: Patient/Family Short Term Goal  Description: Patient's Short Term Goal: Go home    Interventions:   - - Monitor vital signs  - Monitor appropriate labs  - Pain management as needed  - Administer medications per order  - Follow MD orders  - Diagnostics per order  - Fall precautions  - Activity as tolerated  - Assist with activities of daily living as needed  - Update / inform patient and family on plan of care   - See additional Care Plan goals for specific interventions  Outcome: Progressing     Problem: GASTROINTESTINAL - ADULT  Goal: Minimal or absence of nausea and vomiting  Description: INTERVENTIONS:  - Maintain adequate hydration with IV or PO as ordered and tolerated  - Nasogastric tube to low intermittent suction as ordered  - Evaluate effectiveness of ordered antiemetic medications  - Provide nonpharmacologic comfort measures as appropriate  - Advance diet as tolerated, if ordered  - Obtain nutritional consult as needed  - Evaluate fluid balance  Outcome: Progressing     Problem: METABOLIC/FLUID AND ELECTROLYTES - ADULT  Goal: Glucose maintained within prescribed range  Description: INTERVENTIONS:  - Monitor Blood Glucose as ordered  - Assess for signs and symptoms of hyperglycemia and hypoglycemia  - Administer ordered medications to maintain glucose within target range  - Assess barriers to adequate nutritional intake and initiate nutrition consult as needed  - Instruct patient on self management of diabetes  Outcome: Progressing  Goal: Electrolytes maintained within normal limits  Description: INTERVENTIONS:  - Monitor labs and rhythm and assess patient for signs and symptoms of electrolyte imbalances  - Administer electrolyte replacement as ordered  - Monitor response to electrolyte replacements, including rhythm and repeat lab results as appropriate  - Fluid restriction as ordered  - Instruct patient on fluid and nutrition restrictions as appropriate  Outcome: Progressing     Problem: PAIN - ADULT  Goal: Verbalizes/displays adequate comfort level or patient's stated pain goal  Description: INTERVENTIONS:  - Encourage pt to monitor pain and request assistance  - Assess pain using appropriate pain scale  - Administer analgesics based on type and severity of pain and evaluate response  - Implement non-pharmacological measures as appropriate and evaluate response  - Consider cultural and social influences on pain and pain management  - Manage/alleviate anxiety  - Utilize distraction and/or relaxation techniques  - Monitor for opioid side effects  - Notify MD/LIP if interventions unsuccessful or patient reports new pain  - Anticipate increased pain with activity and pre-medicate as appropriate  Outcome: Progressing     Problem: RISK FOR INFECTION - ADULT  Goal: Absence of fever/infection during anticipated neutropenic period  Description: INTERVENTIONS  - Monitor WBC  - Administer growth factors as ordered  - Implement neutropenic guidelines  Outcome: Progressing     Problem: SAFETY ADULT - FALL  Goal: Free from fall injury  Description: INTERVENTIONS:  - Assess pt frequently for physical needs  - Identify cognitive and physical deficits and behaviors that affect risk of falls.   - [place] fall precautions as indicated by assessment.  - Educate pt/family on patient safety including physical limitations  - Instruct pt to call for assistance with activity based on assessment  - Modify environment to reduce risk of injury  - Provide assistive devices as appropriate  - Consider OT/PT consult to assist with strengthening/mobility  - Encourage toileting schedule  Outcome: Progressing     Problem: DISCHARGE PLANNING  Goal: Discharge to home or other facility with appropriate resources  Description: INTERVENTIONS:  - Identify barriers to discharge w/pt and caregiver  - Include patient/family/discharge partner in discharge planning  - Arrange for needed discharge resources and transportation as appropriate  - Identify discharge learning needs (meds, wound care, etc)  - Arrange for interpreters to assist at discharge as needed  - Consider post-discharge preferences of patient/family/discharge partner  - Complete POLST form as appropriate  - Assess patient's ability to be responsible for managing their own health  - Refer to Case Management Department for coordinating discharge planning if the patient needs post-hospital services based on physician/LIP order or complex needs related to functional status, cognitive ability or social support system  Outcome: Progressing     Patient a/o  x4  Room air   NSR  Self   Iv fluids  CGM to RLQ   AC/HS  increased to full liquid diet   prn norco and dilaudid for pain   CIWAS Q2 hrs.    PRN zofran for nausea   PRN tums given for heartburn + GI cocktail   no other complaints throuhgout shift   call light in reach

## 2022-11-07 NOTE — OPERATIVE REPORT
ESOPHAGOGASTRODUODENOSCOPY (EGD) REPORT    Deven Brownlee     10/19/1980 Age 40year old   PCP No primary care provider on file. Endoscopist Anisa Das MD     Date of procedure: 22    Procedure: EGD w/biopsy    Pre-operative diagnosis: epigastric abd pain, n/v    Post-operative diagnosis: see impression    Medications: MAC    Complications: none    Procedure:  Informed consent was obtained from the patient after the risks of the procedure were discussed, including but not limited to bleeding, perforation, aspiration, infection, or possibility of a missed lesion. After discussions of the risks/benefits and alternatives to this procedure, as well as the planned sedation, the patient was placed in the left lateral decubitus position and begun on continuous blood pressure pulse oximetry and EKG monitoring and this was maintained throughout the procedure. Once an adequate level of sedation was obtained a bite block was placed. Then the lubricated tip of the [id number] diagnostic video upper endoscope was inserted and advanced using direct visualization into the posterior pharynx and ultimately into the esophagus, stomach, and duodenum. Complications: None    Findings:      1. Esophagus: Normal esophagus    2. Stomach: We then entered the stomach. Small amount of retained liquid, suctioned upon entry, minimal impact on visualization. Gastric mucosa appeared normal in the forward view with no evidence of erythema, erosions, or ulcerations. There was no evidence of any luminal or submucosal masses. A retroflexed view allowed examination of the angularis, cardia and fundus and these areas also appeared normal with a non-patulous cardia. No hiatal hernia seen. Random biopsies of the stomach (body, antrum, cardia, and incisura) were taken with cold forceps for histology. 3. Duodenum: The duodenal mucosa appeared normal in the 1st and 2nd portion of the duodenum.  Random biopsies obtained with cold forceps for histology. We then withdrew the instrument from the patient who tolerated the procedure well. Impression:   1. Normal EGD. Gastric and duodenal bx obtained. No obvious findings on egd to explain recent symptoms. Recommend:  1. Await pathology  2. Start miralax bid for constipation  3. Ok to resume diet from gi stance     >>>If tissue was sampled/removed and you have not received your pathology results either by phone or letter within 2 weeks, please call our office at [phone].     Specimens:  Gastric, duodenal    Blood loss: <1 ml      Fabiola Herrera MD  [facility], [facility]

## 2022-11-07 NOTE — PLAN OF CARE
Problem: Patient Centered Care  Goal: Patient preferences are identified and integrated in the patient's plan of care  Description: Interventions:  - What would you like us to know as we care for you?  I live at home with my wife  - Provide timely, complete, and accurate information to patient/family  - Incorporate patient and family knowledge, values, beliefs, and cultural backgrounds into the planning and delivery of care  - Encourage patient/family to participate in care and decision-making at the level they choose  - Honor patient and family perspectives and choices  Outcome: Progressing     Problem: Diabetes/Glucose Control  Goal: Glucose maintained within prescribed range  Description: INTERVENTIONS:  - Monitor Blood Glucose as ordered  - Assess for signs and symptoms of hyperglycemia and hypoglycemia  - Administer ordered medications to maintain glucose within target range  - Assess barriers to adequate nutritional intake and initiate nutrition consult as needed  - Instruct patient on self management of diabetes  Outcome: Progressing     Problem: Patient/Family Goals  Goal: Patient/Family Long Term Goal  Description: Patient's Long Term Goal: Don't get this pain anymore    Interventions:  - - Monitor vital signs  - Monitor appropriate labs  - Pain management as needed  - Administer medications per order  - Follow MD orders  - Diagnostics per order  - Fall precautions  - Activity as tolerated  - Assist with activities of daily living as needed  - Update / inform patient and family on plan of care  - Discharge planning   - See additional Care Plan goals for specific interventions  Outcome: Progressing  Goal: Patient/Family Short Term Goal  Description: Patient's Short Term Goal: Go home    Interventions:   - - Monitor vital signs  - Monitor appropriate labs  - Pain management as needed  - Administer medications per order  - Follow MD orders  - Diagnostics per order  - Fall precautions  - Activity as tolerated  - Assist with activities of daily living as needed  - Update / inform patient and family on plan of care   - See additional Care Plan goals for specific interventions  Outcome: Progressing     Problem: GASTROINTESTINAL - ADULT  Goal: Minimal or absence of nausea and vomiting  Description: INTERVENTIONS:  - Maintain adequate hydration with IV or PO as ordered and tolerated  - Nasogastric tube to low intermittent suction as ordered  - Evaluate effectiveness of ordered antiemetic medications  - Provide nonpharmacologic comfort measures as appropriate  - Advance diet as tolerated, if ordered  - Obtain nutritional consult as needed  - Evaluate fluid balance  Outcome: Progressing     Problem: METABOLIC/FLUID AND ELECTROLYTES - ADULT  Goal: Glucose maintained within prescribed range  Description: INTERVENTIONS:  - Monitor Blood Glucose as ordered  - Assess for signs and symptoms of hyperglycemia and hypoglycemia  - Administer ordered medications to maintain glucose within target range  - Assess barriers to adequate nutritional intake and initiate nutrition consult as needed  - Instruct patient on self management of diabetes  Outcome: Progressing  Goal: Electrolytes maintained within normal limits  Description: INTERVENTIONS:  - Monitor labs and rhythm and assess patient for signs and symptoms of electrolyte imbalances  - Administer electrolyte replacement as ordered  - Monitor response to electrolyte replacements, including rhythm and repeat lab results as appropriate  - Fluid restriction as ordered  - Instruct patient on fluid and nutrition restrictions as appropriate  Outcome: Progressing     Problem: PAIN - ADULT  Goal: Verbalizes/displays adequate comfort level or patient's stated pain goal  Description: INTERVENTIONS:  - Encourage pt to monitor pain and request assistance  - Assess pain using appropriate pain scale  - Administer analgesics based on type and severity of pain and evaluate response  - Implement non-pharmacological measures as appropriate and evaluate response  - Consider cultural and social influences on pain and pain management  - Manage/alleviate anxiety  - Utilize distraction and/or relaxation techniques  - Monitor for opioid side effects  - Notify MD/LIP if interventions unsuccessful or patient reports new pain  - Anticipate increased pain with activity and pre-medicate as appropriate  Outcome: Progressing     Problem: RISK FOR INFECTION - ADULT  Goal: Absence of fever/infection during anticipated neutropenic period  Description: INTERVENTIONS  - Monitor WBC  - Administer growth factors as ordered  - Implement neutropenic guidelines  Outcome: Progressing     Problem: SAFETY ADULT - FALL  Goal: Free from fall injury  Description: INTERVENTIONS:  - Assess pt frequently for physical needs  - Identify cognitive and physical deficits and behaviors that affect risk of falls.   - [place] fall precautions as indicated by assessment.  - Educate pt/family on patient safety including physical limitations  - Instruct pt to call for assistance with activity based on assessment  - Modify environment to reduce risk of injury  - Provide assistive devices as appropriate  - Consider OT/PT consult to assist with strengthening/mobility  - Encourage toileting schedule  Outcome: Progressing     Problem: DISCHARGE PLANNING  Goal: Discharge to home or other facility with appropriate resources  Description: INTERVENTIONS:  - Identify barriers to discharge w/pt and caregiver  - Include patient/family/discharge partner in discharge planning  - Arrange for needed discharge resources and transportation as appropriate  - Identify discharge learning needs (meds, wound care, etc)  - Arrange for interpreters to assist at discharge as needed  - Consider post-discharge preferences of patient/family/discharge partner  - Complete POLST form as appropriate  - Assess patient's ability to be responsible for managing their own health  - Refer to Case Management Department for coordinating discharge planning if the patient needs post-hospital services based on physician/LIP order or complex needs related to functional status, cognitive ability or social support system  Outcome: Progressing   Patient currently stable at this time. Vitals stable. Complains of pain, as needed pain medication given, tolerated well. Fall and safety precautions in place. Bed at lowest position, bed alarm in place. Call light and personal belongings within reach. Frequent nursing rounds completed.

## 2022-11-07 NOTE — DISCHARGE PLANNING
MDO for discharge. Patient chart reviewed for discharge: Medication Reconciliation completed, Specialist/PCP follow up listed, and disease specific Instructions/Education included in After Visit Summary. Discharge RN notified patient's RN of AVS completion and verified all consultants have signed off. Patient's RN to notify DC RN if discharge status changes.       Lala Mon RN, Discharge Leader

## 2022-11-07 NOTE — DISCHARGE INSTRUCTIONS
Substance abuse treatment providers in network with BC HMO [facility])    [facility] Outpatient Substance Abuse and [address]: [address] [facility],[place] [address]  Phone: [address] Office  [address] [facility], [address] [place] [address]  Phone: [phone] [facility] [address]  [facility] [address] [facility] [place], [address]  Phone: [phone]  [facility]  [facility], [place], [address]  Phone: [phone]  [facility] [address] [facility] [place], [address]  Phone: [phone]  Alcohol support groups    SMART Recovery: go to www.smartrecovery. org and search groups by zip code, there are multiple groups available within 20 miles of your home both face to face and virtual.    To find AA groups near you or need support call [facility] hotline or use email listed below  Phone: [phone]  Email: Liat@[employer]. org  Website: www.[email or web address] org    To find AA groups near you or need support call [address] AA hotline or use email listed below  Phone: [phone] or [phone]  Email: [email or web address]  Website: [email or web address] org

## 2022-11-08 ENCOUNTER — APPOINTMENT (OUTPATIENT)
Dept: GENERAL RADIOLOGY | Facility: HOSPITAL | Age: 42
DRG: 329 | End: 2022-11-08
Attending: HOSPITALIST
Payer: COMMERCIAL

## 2022-11-08 ENCOUNTER — APPOINTMENT (OUTPATIENT)
Dept: GENERAL RADIOLOGY | Facility: HOSPITAL | Age: 42
End: 2022-11-08
Attending: HOSPITALIST
Payer: COMMERCIAL

## 2022-11-08 PROBLEM — K31.84 GASTROPARESIS: Status: ACTIVE | Noted: 2022-11-08

## 2022-11-08 LAB
EST. AVERAGE GLUCOSE BLD GHB EST-MCNC: 174 MG/DL (ref 68–126)
GLUCOSE BLDC GLUCOMTR-MCNC: 231 MG/DL (ref 70–99)
GLUCOSE BLDC GLUCOMTR-MCNC: 242 MG/DL (ref 70–99)
GLUCOSE BLDC GLUCOMTR-MCNC: 248 MG/DL (ref 70–99)
GLUCOSE BLDC GLUCOMTR-MCNC: 251 MG/DL (ref 70–99)
GLUCOSE BLDC GLUCOMTR-MCNC: 256 MG/DL (ref 70–99)
HBA1C MFR BLD: 7.7 % (ref ?–5.7)
SARS-COV-2 RNA RESP QL NAA+PROBE: NOT DETECTED

## 2022-11-08 PROCEDURE — 99223 1ST HOSP IP/OBS HIGH 75: CPT | Performed by: INTERNAL MEDICINE

## 2022-11-08 PROCEDURE — 74019 RADEX ABDOMEN 2 VIEWS: CPT | Performed by: HOSPITALIST

## 2022-11-08 PROCEDURE — 99214 OFFICE O/P EST MOD 30 MIN: CPT | Performed by: INTERNAL MEDICINE

## 2022-11-08 RX ORDER — DEXTROSE MONOHYDRATE 25 G/50ML
50 INJECTION, SOLUTION INTRAVENOUS
Status: DISCONTINUED | OUTPATIENT
Start: 2022-11-08 | End: 2022-11-29

## 2022-11-08 RX ORDER — KETOROLAC TROMETHAMINE 15 MG/ML
15 INJECTION, SOLUTION INTRAMUSCULAR; INTRAVENOUS ONCE
Status: COMPLETED | OUTPATIENT
Start: 2022-11-08 | End: 2022-11-08

## 2022-11-08 RX ORDER — HALOPERIDOL 5 MG/ML
2.5 INJECTION INTRAMUSCULAR ONCE
Status: COMPLETED | OUTPATIENT
Start: 2022-11-08 | End: 2022-11-08

## 2022-11-08 RX ORDER — MORPHINE SULFATE 2 MG/ML
2 INJECTION, SOLUTION INTRAMUSCULAR; INTRAVENOUS EVERY 2 HOUR PRN
Status: DISCONTINUED | OUTPATIENT
Start: 2022-11-08 | End: 2022-11-15

## 2022-11-08 RX ORDER — NICOTINE POLACRILEX 4 MG
30 LOZENGE BUCCAL
Status: DISCONTINUED | OUTPATIENT
Start: 2022-11-08 | End: 2022-11-29

## 2022-11-08 RX ORDER — POLYETHYLENE GLYCOL 3350 17 G/17G
17 POWDER, FOR SOLUTION ORAL 2 TIMES DAILY
Status: DISCONTINUED | OUTPATIENT
Start: 2022-11-08 | End: 2022-11-13

## 2022-11-08 RX ORDER — METOCLOPRAMIDE 10 MG/1
10 TABLET ORAL 3 TIMES DAILY PRN
Qty: 20 TABLET | Refills: 0 | Status: SHIPPED | OUTPATIENT
Start: 2022-11-08 | End: 2022-12-08

## 2022-11-08 RX ORDER — SENNA AND DOCUSATE SODIUM 50; 8.6 MG/1; MG/1
2 TABLET, FILM COATED ORAL DAILY
Status: DISCONTINUED | OUTPATIENT
Start: 2022-11-08 | End: 2022-11-09

## 2022-11-08 RX ORDER — BISACODYL 10 MG
10 SUPPOSITORY, RECTAL RECTAL DAILY
Status: DISCONTINUED | OUTPATIENT
Start: 2022-11-08 | End: 2022-11-13

## 2022-11-08 RX ORDER — OXYCODONE HYDROCHLORIDE 5 MG/1
5 TABLET ORAL EVERY 6 HOURS PRN
Status: DISCONTINUED | OUTPATIENT
Start: 2022-11-08 | End: 2022-11-22

## 2022-11-08 RX ORDER — MORPHINE SULFATE 2 MG/ML
1 INJECTION, SOLUTION INTRAMUSCULAR; INTRAVENOUS EVERY 2 HOUR PRN
Status: DISCONTINUED | OUTPATIENT
Start: 2022-11-08 | End: 2022-11-15

## 2022-11-08 RX ORDER — DIPHENHYDRAMINE HYDROCHLORIDE 50 MG/ML
25 INJECTION INTRAMUSCULAR; INTRAVENOUS EVERY 6 HOURS PRN
Status: DISCONTINUED | OUTPATIENT
Start: 2022-11-08 | End: 2022-11-22

## 2022-11-08 RX ORDER — HEPARIN SODIUM 5000 [USP'U]/ML
5000 INJECTION, SOLUTION INTRAVENOUS; SUBCUTANEOUS EVERY 12 HOURS SCHEDULED
Status: DISCONTINUED | OUTPATIENT
Start: 2022-11-08 | End: 2022-11-17

## 2022-11-08 RX ORDER — LORAZEPAM 1 MG/1
1 TABLET ORAL 2 TIMES DAILY
Status: DISCONTINUED | OUTPATIENT
Start: 2022-11-08 | End: 2022-11-14

## 2022-11-08 RX ORDER — HYDROMORPHONE HYDROCHLORIDE 1 MG/ML
0.8 INJECTION, SOLUTION INTRAMUSCULAR; INTRAVENOUS; SUBCUTANEOUS EVERY 2 HOUR PRN
Status: DISCONTINUED | OUTPATIENT
Start: 2022-11-08 | End: 2022-11-15

## 2022-11-08 RX ORDER — HYDROMORPHONE HYDROCHLORIDE 1 MG/ML
0.2 INJECTION, SOLUTION INTRAMUSCULAR; INTRAVENOUS; SUBCUTANEOUS EVERY 2 HOUR PRN
Status: DISCONTINUED | OUTPATIENT
Start: 2022-11-08 | End: 2022-11-15

## 2022-11-08 RX ORDER — MORPHINE SULFATE 2 MG/ML
0.5 INJECTION, SOLUTION INTRAMUSCULAR; INTRAVENOUS EVERY 2 HOUR PRN
Status: DISCONTINUED | OUTPATIENT
Start: 2022-11-08 | End: 2022-11-15

## 2022-11-08 RX ORDER — MELATONIN
100 DAILY
Status: DISCONTINUED | OUTPATIENT
Start: 2022-11-08 | End: 2022-11-22

## 2022-11-08 RX ORDER — METOCLOPRAMIDE HYDROCHLORIDE 5 MG/ML
10 INJECTION INTRAMUSCULAR; INTRAVENOUS EVERY 6 HOURS
Status: DISCONTINUED | OUTPATIENT
Start: 2022-11-08 | End: 2022-11-11

## 2022-11-08 RX ORDER — ONDANSETRON 2 MG/ML
4 INJECTION INTRAMUSCULAR; INTRAVENOUS EVERY 4 HOURS PRN
Status: DISCONTINUED | OUTPATIENT
Start: 2022-11-08 | End: 2022-11-17

## 2022-11-08 RX ORDER — NICOTINE POLACRILEX 4 MG
15 LOZENGE BUCCAL
Status: DISCONTINUED | OUTPATIENT
Start: 2022-11-08 | End: 2022-11-29

## 2022-11-08 RX ORDER — FOLIC ACID 1 MG/1
1 TABLET ORAL DAILY
Status: DISCONTINUED | OUTPATIENT
Start: 2022-11-08 | End: 2022-11-22

## 2022-11-08 RX ORDER — SODIUM CHLORIDE 9 MG/ML
INJECTION, SOLUTION INTRAVENOUS CONTINUOUS
Status: DISCONTINUED | OUTPATIENT
Start: 2022-11-08 | End: 2022-11-11

## 2022-11-08 RX ORDER — MORPHINE SULFATE 2 MG/ML
2 INJECTION, SOLUTION INTRAMUSCULAR; INTRAVENOUS ONCE
Status: COMPLETED | OUTPATIENT
Start: 2022-11-08 | End: 2022-11-08

## 2022-11-08 RX ORDER — HYDROMORPHONE HYDROCHLORIDE 1 MG/ML
0.4 INJECTION, SOLUTION INTRAMUSCULAR; INTRAVENOUS; SUBCUTANEOUS EVERY 2 HOUR PRN
Status: DISCONTINUED | OUTPATIENT
Start: 2022-11-08 | End: 2022-11-15

## 2022-11-08 RX ORDER — PANTOPRAZOLE SODIUM 40 MG/1
40 TABLET, DELAYED RELEASE ORAL
Status: DISCONTINUED | OUTPATIENT
Start: 2022-11-09 | End: 2022-11-09

## 2022-11-08 NOTE — ED QUICK NOTES
Orders for admission, patient is aware of plan and ready to go upstairs. Any questions, please call ED RN Taras Rodriguez at extension [phone].      Patient Covid vaccination status: Fully vaccinated     COVID Test Ordered in ED: Rapid SARS-CoV-2 by PCR    COVID Suspicion at Admission: N/A    Running Infusions:  None    Mental Status/LOC at time of transport: A&O x 4    Other pertinent information:   CIWA score: N/A   NIH score:  N/A

## 2022-11-08 NOTE — PLAN OF CARE
Problem: Patient Centered Care  Goal: Patient preferences are identified and integrated in the patient's plan of care  Description: Interventions:  - What would you like us to know as we care for you?  I was just discharged yesterday  - Provide timely, complete, and accurate information to patient/family  - Incorporate patient and family knowledge, values, beliefs, and cultural backgrounds into the planning and delivery of care  - Encourage patient/family to participate in care and decision-making at the level they choose  - Honor patient and family perspectives and choices  Outcome: Progressing     Problem: Diabetes/Glucose Control  Goal: Glucose maintained within prescribed range  Description: INTERVENTIONS:  - Monitor Blood Glucose as ordered  - Assess for signs and symptoms of hyperglycemia and hypoglycemia  - Administer ordered medications to maintain glucose within target range  - Assess barriers to adequate nutritional intake and initiate nutrition consult as needed  - Instruct patient on self management of diabetes  Outcome: Progressing    Problem: Patient/Family Goals  Goal: Patient/Family Long Term Goal  Description: Patient's Long Term Goal: to go home    Interventions:  - take medications as prescribed  - follow MD orders  - monitor labs  - See additional Care Plan goals for specific interventions  Outcome: Progressing  Goal: Patient/Family Short Term Goal  Description: Patient's Short Term Goal: to report pain    Interventions:   -PRN pain medications  - testing  - monitor labs  - See additional Care Plan goals for specific interventions  Outcome: Progressing        Problem: GASTROINTESTINAL - ADULT  Goal: Minimal or absence of nausea and vomiting  Description: INTERVENTIONS:  - Maintain adequate hydration with IV or PO as ordered and tolerated  - Nasogastric tube to low intermittent suction as ordered  - Evaluate effectiveness of ordered antiemetic medications  - Provide nonpharmacologic comfort measures as appropriate  - Advance diet as tolerated, if ordered  - Obtain nutritional consult as needed  - Evaluate fluid balance  Outcome: Progressing  Goal: Maintains or returns to baseline bowel function  Description: INTERVENTIONS:  - Assess bowel function  - Maintain adequate hydration with IV or PO as ordered and tolerated  - Evaluate effectiveness of GI medications  - Encourage mobilization and activity  - Obtain nutritional consult as needed  - Establish a toileting routine/schedule  - Consider collaborating with pharmacy to review patient's medication profile  Outcome: Progressing

## 2022-11-08 NOTE — ED INITIAL ASSESSMENT (HPI)
Pt came in w/ c/o uncontrolled abd pain that per pt was just DC today 1.5 hrs ago. Per pt while admitted was receiving dilaudid, but pain meds DC'd with are not helping with pain.

## 2022-11-09 ENCOUNTER — APPOINTMENT (OUTPATIENT)
Dept: GENERAL RADIOLOGY | Facility: HOSPITAL | Age: 42
DRG: 329 | End: 2022-11-09
Attending: INTERNAL MEDICINE
Payer: COMMERCIAL

## 2022-11-09 ENCOUNTER — APPOINTMENT (OUTPATIENT)
Dept: GENERAL RADIOLOGY | Facility: HOSPITAL | Age: 42
End: 2022-11-09
Attending: INTERNAL MEDICINE
Payer: COMMERCIAL

## 2022-11-09 LAB
ALBUMIN SERPL-MCNC: 2.8 G/DL (ref 3.4–5)
ALBUMIN/GLOB SERPL: 0.9 {RATIO} (ref 1–2)
ALP LIVER SERPL-CCNC: 46 U/L
ALT SERPL-CCNC: 16 U/L
ANION GAP SERPL CALC-SCNC: 12 MMOL/L (ref 0–18)
AST SERPL-CCNC: 12 U/L (ref 15–37)
BASOPHILS # BLD AUTO: 0.04 X10(3) UL (ref 0–0.2)
BASOPHILS NFR BLD AUTO: 0.6 %
BILIRUB SERPL-MCNC: 0.8 MG/DL (ref 0.1–2)
BUN BLD-MCNC: 11 MG/DL (ref 7–18)
BUN/CREAT SERPL: 12.9 (ref 10–20)
CALCIUM BLD-MCNC: 8.2 MG/DL (ref 8.5–10.1)
CHLORIDE SERPL-SCNC: 104 MMOL/L (ref 98–112)
CO2 SERPL-SCNC: 21 MMOL/L (ref 21–32)
CREAT BLD-MCNC: 0.85 MG/DL
DEPRECATED RDW RBC AUTO: 39.1 FL (ref 35.1–46.3)
EOSINOPHIL # BLD AUTO: 0.16 X10(3) UL (ref 0–0.7)
EOSINOPHIL NFR BLD AUTO: 2.3 %
ERYTHROCYTE [DISTWIDTH] IN BLOOD BY AUTOMATED COUNT: 11.5 % (ref 11–15)
GFR SERPLBLD BASED ON 1.73 SQ M-ARVRAT: 112 ML/MIN/1.73M2 (ref 60–?)
GLOBULIN PLAS-MCNC: 3.2 G/DL (ref 2.8–4.4)
GLUCOSE BLD-MCNC: 164 MG/DL (ref 70–99)
GLUCOSE BLDC GLUCOMTR-MCNC: 173 MG/DL (ref 70–99)
GLUCOSE BLDC GLUCOMTR-MCNC: 183 MG/DL (ref 70–99)
GLUCOSE BLDC GLUCOMTR-MCNC: 256 MG/DL (ref 70–99)
GLUCOSE BLDC GLUCOMTR-MCNC: 260 MG/DL (ref 70–99)
HCT VFR BLD AUTO: 40.4 %
HGB BLD-MCNC: 13.6 G/DL
IMM GRANULOCYTES # BLD AUTO: 0.02 X10(3) UL (ref 0–1)
IMM GRANULOCYTES NFR BLD: 0.3 %
LYMPHOCYTES # BLD AUTO: 1.06 X10(3) UL (ref 1–4)
LYMPHOCYTES NFR BLD AUTO: 15 %
MCH RBC QN AUTO: 31.4 PG (ref 26–34)
MCHC RBC AUTO-ENTMCNC: 33.7 G/DL (ref 31–37)
MCV RBC AUTO: 93.3 FL
MONOCYTES # BLD AUTO: 0.94 X10(3) UL (ref 0.1–1)
MONOCYTES NFR BLD AUTO: 13.3 %
NEUTROPHILS # BLD AUTO: 4.85 X10 (3) UL (ref 1.5–7.7)
NEUTROPHILS # BLD AUTO: 4.85 X10(3) UL (ref 1.5–7.7)
NEUTROPHILS NFR BLD AUTO: 68.5 %
OSMOLALITY SERPL CALC.SUM OF ELEC: 287 MOSM/KG (ref 275–295)
PLATELET # BLD AUTO: 238 10(3)UL (ref 150–450)
POTASSIUM SERPL-SCNC: 4.4 MMOL/L (ref 3.5–5.1)
PROT SERPL-MCNC: 6 G/DL (ref 6.4–8.2)
RBC # BLD AUTO: 4.33 X10(6)UL
SODIUM SERPL-SCNC: 137 MMOL/L (ref 136–145)
WBC # BLD AUTO: 7.1 X10(3) UL (ref 4–11)

## 2022-11-09 PROCEDURE — 99233 SBSQ HOSP IP/OBS HIGH 50: CPT | Performed by: HOSPITALIST

## 2022-11-09 PROCEDURE — 74018 RADEX ABDOMEN 1 VIEW: CPT | Performed by: INTERNAL MEDICINE

## 2022-11-09 PROCEDURE — 99214 OFFICE O/P EST MOD 30 MIN: CPT | Performed by: INTERNAL MEDICINE

## 2022-11-09 RX ORDER — KETOROLAC TROMETHAMINE 15 MG/ML
15 INJECTION, SOLUTION INTRAMUSCULAR; INTRAVENOUS EVERY 6 HOURS PRN
Status: DISCONTINUED | OUTPATIENT
Start: 2022-11-09 | End: 2022-11-10

## 2022-11-09 RX ORDER — KETOROLAC TROMETHAMINE 15 MG/ML
15 INJECTION, SOLUTION INTRAMUSCULAR; INTRAVENOUS EVERY 6 HOURS PRN
Status: DISCONTINUED | OUTPATIENT
Start: 2022-11-09 | End: 2022-11-09

## 2022-11-09 NOTE — PLAN OF CARE
Pt alert and oriented x4, on RA, PRN pain med given, pain better controlled overnight. IVF continued. Still no BM overnight. Fall precaution maintained, call light within reach, will continue to monitor  Problem: Patient Centered Care  Goal: Patient preferences are identified and integrated in the patient's plan of care  Description: Interventions:  - What would you like us to know as we care for you?  I was just discharged yesterday  - Provide timely, complete, and accurate information to patient/family  - Incorporate patient and family knowledge, values, beliefs, and cultural backgrounds into the planning and delivery of care  - Encourage patient/family to participate in care and decision-making at the level they choose  - Honor patient and family perspectives and choices  Outcome: Progressing     Problem: Diabetes/Glucose Control  Goal: Glucose maintained within prescribed range  Description: INTERVENTIONS:  - Monitor Blood Glucose as ordered  - Assess for signs and symptoms of hyperglycemia and hypoglycemia  - Administer ordered medications to maintain glucose within target range  - Assess barriers to adequate nutritional intake and initiate nutrition consult as needed  - Instruct patient on self management of diabetes  Outcome: Progressing     Problem: Patient/Family Goals  Goal: Patient/Family Long Term Goal  Description: Patient's Long Term Goal: to go home    Interventions:  - take medications as prescribed  - follow MD orders  - monitor labs  - See additional Care Plan goals for specific interventions  Outcome: Progressing  Goal: Patient/Family Short Term Goal  Description: Patient's Short Term Goal: to report pain    Interventions:   -PRN pain medications  - testing  - monitor labs  - See additional Care Plan goals for specific interventions  Outcome: Progressing     Problem: GASTROINTESTINAL - ADULT  Goal: Minimal or absence of nausea and vomiting  Description: INTERVENTIONS:  - Maintain adequate hydration with IV or PO as ordered and tolerated  - Nasogastric tube to low intermittent suction as ordered  - Evaluate effectiveness of ordered antiemetic medications  - Provide nonpharmacologic comfort measures as appropriate  - Advance diet as tolerated, if ordered  - Obtain nutritional consult as needed  - Evaluate fluid balance  Outcome: Progressing  Goal: Maintains or returns to baseline bowel function  Description: INTERVENTIONS:  - Assess bowel function  - Maintain adequate hydration with IV or PO as ordered and tolerated  - Evaluate effectiveness of GI medications  - Encourage mobilization and activity  - Obtain nutritional consult as needed  - Establish a toileting routine/schedule  - Consider collaborating with pharmacy to review patient's medication profile  Outcome: Progressing     Problem: METABOLIC/FLUID AND ELECTROLYTES - ADULT  Goal: Glucose maintained within prescribed range  Description: INTERVENTIONS:  - Monitor Blood Glucose as ordered  - Assess for signs and symptoms of hyperglycemia and hypoglycemia  - Administer ordered medications to maintain glucose within target range  - Assess barriers to adequate nutritional intake and initiate nutrition consult as needed  - Instruct patient on self management of diabetes  Outcome: Progressing

## 2022-11-09 NOTE — PLAN OF CARE
Patient is alert and oriented x4. On room air. VSS stable. Bowel sounds present. Voiding freely. IVF infusing. ACHS accuchecks. Receiving scheduled Reglan for nausea. PRN Dilaudid for pain. Call light within reach. Appropriate safety precautions maintained. Problem: Patient Centered Care  Goal: Patient preferences are identified and integrated in the patient's plan of care  Description: Interventions:  - What would you like us to know as we care for you?  I was just discharged yesterday  - Provide timely, complete, and accurate information to patient/family  - Incorporate patient and family knowledge, values, beliefs, and cultural backgrounds into the planning and delivery of care  - Encourage patient/family to participate in care and decision-making at the level they choose  - Honor patient and family perspectives and choices  Outcome: Progressing     Problem: Diabetes/Glucose Control  Goal: Glucose maintained within prescribed range  Description: INTERVENTIONS:  - Monitor Blood Glucose as ordered  - Assess for signs and symptoms of hyperglycemia and hypoglycemia  - Administer ordered medications to maintain glucose within target range  - Assess barriers to adequate nutritional intake and initiate nutrition consult as needed  - Instruct patient on self management of diabetes  Outcome: Progressing     Problem: Patient/Family Goals  Goal: Patient/Family Long Term Goal  Description: Patient's Long Term Goal: to go home    Interventions:  - take medications as prescribed  - follow MD orders  - monitor labs  - See additional Care Plan goals for specific interventions  Outcome: Progressing  Goal: Patient/Family Short Term Goal  Description: Patient's Short Term Goal: to report pain    Interventions:   -PRN pain medications  - testing  - monitor labs  - See additional Care Plan goals for specific interventions  Outcome: Progressing     Problem: GASTROINTESTINAL - ADULT  Goal: Minimal or absence of nausea and vomiting  Description: INTERVENTIONS:  - Maintain adequate hydration with IV or PO as ordered and tolerated  - Nasogastric tube to low intermittent suction as ordered  - Evaluate effectiveness of ordered antiemetic medications  - Provide nonpharmacologic comfort measures as appropriate  - Advance diet as tolerated, if ordered  - Obtain nutritional consult as needed  - Evaluate fluid balance  Outcome: Progressing  Goal: Maintains or returns to baseline bowel function  Description: INTERVENTIONS:  - Assess bowel function  - Maintain adequate hydration with IV or PO as ordered and tolerated  - Evaluate effectiveness of GI medications  - Encourage mobilization and activity  - Obtain nutritional consult as needed  - Establish a toileting routine/schedule  - Consider collaborating with pharmacy to review patient's medication profile  Outcome: Progressing     Problem: METABOLIC/FLUID AND ELECTROLYTES - ADULT  Goal: Glucose maintained within prescribed range  Description: INTERVENTIONS:  - Monitor Blood Glucose as ordered  - Assess for signs and symptoms of hyperglycemia and hypoglycemia  - Administer ordered medications to maintain glucose within target range  - Assess barriers to adequate nutritional intake and initiate nutrition consult as needed  - Instruct patient on self management of diabetes  Outcome: Progressing

## 2022-11-10 LAB
ALBUMIN SERPL-MCNC: 3 G/DL (ref 3.4–5)
ALBUMIN/GLOB SERPL: 0.8 {RATIO} (ref 1–2)
ALP LIVER SERPL-CCNC: 52 U/L
ALT SERPL-CCNC: 18 U/L
ANION GAP SERPL CALC-SCNC: 14 MMOL/L (ref 0–18)
ANION GAP SERPL CALC-SCNC: 14 MMOL/L (ref 0–18)
AST SERPL-CCNC: 14 U/L (ref 15–37)
BASE EXCESS BLD CALC-SCNC: -10.7 MMOL/L (ref ?–2)
BASE EXCESS BLD CALC-SCNC: -6.1 MMOL/L (ref ?–2)
BASOPHILS # BLD AUTO: 0.06 X10(3) UL (ref 0–0.2)
BASOPHILS NFR BLD AUTO: 0.8 %
BILIRUB SERPL-MCNC: 0.6 MG/DL (ref 0.1–2)
BUN BLD-MCNC: 11 MG/DL (ref 7–18)
BUN BLD-MCNC: 11 MG/DL (ref 7–18)
BUN/CREAT SERPL: 10.2 (ref 10–20)
BUN/CREAT SERPL: 11.1 (ref 10–20)
CALCIUM BLD-MCNC: 8.2 MG/DL (ref 8.5–10.1)
CALCIUM BLD-MCNC: 8.5 MG/DL (ref 8.5–10.1)
CHLORIDE SERPL-SCNC: 104 MMOL/L (ref 98–112)
CHLORIDE SERPL-SCNC: 107 MMOL/L (ref 98–112)
CO2 SERPL-SCNC: 15 MMOL/L (ref 21–32)
CO2 SERPL-SCNC: 15 MMOL/L (ref 21–32)
CREAT BLD-MCNC: 0.99 MG/DL
CREAT BLD-MCNC: 1.08 MG/DL
DEPRECATED RDW RBC AUTO: 38.2 FL (ref 35.1–46.3)
EOSINOPHIL # BLD AUTO: 0.22 X10(3) UL (ref 0–0.7)
EOSINOPHIL NFR BLD AUTO: 3 %
ERYTHROCYTE [DISTWIDTH] IN BLOOD BY AUTOMATED COUNT: 11.3 % (ref 11–15)
GFR SERPLBLD BASED ON 1.73 SQ M-ARVRAT: 88 ML/MIN/1.73M2 (ref 60–?)
GFR SERPLBLD BASED ON 1.73 SQ M-ARVRAT: 98 ML/MIN/1.73M2 (ref 60–?)
GLOBULIN PLAS-MCNC: 3.6 G/DL (ref 2.8–4.4)
GLUCOSE BLD-MCNC: 216 MG/DL (ref 70–99)
GLUCOSE BLD-MCNC: 252 MG/DL (ref 70–99)
GLUCOSE BLDC GLUCOMTR-MCNC: 134 MG/DL (ref 70–99)
GLUCOSE BLDC GLUCOMTR-MCNC: 164 MG/DL (ref 70–99)
GLUCOSE BLDC GLUCOMTR-MCNC: 173 MG/DL (ref 70–99)
GLUCOSE BLDC GLUCOMTR-MCNC: 178 MG/DL (ref 70–99)
GLUCOSE BLDC GLUCOMTR-MCNC: 180 MG/DL (ref 70–99)
GLUCOSE BLDC GLUCOMTR-MCNC: 196 MG/DL (ref 70–99)
GLUCOSE BLDC GLUCOMTR-MCNC: 235 MG/DL (ref 70–99)
GLUCOSE BLDC GLUCOMTR-MCNC: 252 MG/DL (ref 70–99)
GLUCOSE BLDC GLUCOMTR-MCNC: 253 MG/DL (ref 70–99)
GLUCOSE BLDC GLUCOMTR-MCNC: 273 MG/DL (ref 70–99)
GLUCOSE BLDC GLUCOMTR-MCNC: 277 MG/DL (ref 70–99)
HCO3 BLDA-SCNC: 16.6 MEQ/L (ref 21–27)
HCO3 BLDA-SCNC: 20.2 MEQ/L (ref 21–27)
HCT VFR BLD AUTO: 39.6 %
HGB BLD-MCNC: 13.6 G/DL
IMM GRANULOCYTES # BLD AUTO: 0.03 X10(3) UL (ref 0–1)
IMM GRANULOCYTES NFR BLD: 0.4 %
LYMPHOCYTES # BLD AUTO: 1.2 X10(3) UL (ref 1–4)
LYMPHOCYTES NFR BLD AUTO: 16.1 %
MAGNESIUM SERPL-MCNC: 1.9 MG/DL (ref 1.6–2.6)
MCH RBC QN AUTO: 32 PG (ref 26–34)
MCHC RBC AUTO-ENTMCNC: 34.3 G/DL (ref 31–37)
MCV RBC AUTO: 93.2 FL
MODIFIED ALLEN TEST: POSITIVE
MODIFIED ALLEN TEST: POSITIVE
MONOCYTES # BLD AUTO: 1.18 X10(3) UL (ref 0.1–1)
MONOCYTES NFR BLD AUTO: 15.8 %
NEUTROPHILS # BLD AUTO: 4.76 X10 (3) UL (ref 1.5–7.7)
NEUTROPHILS # BLD AUTO: 4.76 X10(3) UL (ref 1.5–7.7)
NEUTROPHILS NFR BLD AUTO: 63.9 %
O2 CT BLD-SCNC: 17.2 VOL% (ref 15–23)
O2 CT BLD-SCNC: 17.5 VOL% (ref 15–23)
OSMOLALITY SERPL CALC.SUM OF ELEC: 284 MOSM/KG (ref 275–295)
OSMOLALITY SERPL CALC.SUM OF ELEC: 288 MOSM/KG (ref 275–295)
PCO2 BLDA: 28 MM HG (ref 35–45)
PCO2 BLDA: 29 MM HG (ref 35–45)
PH BLDA: 7.31 [PH] (ref 7.35–7.45)
PH BLDA: 7.39 [PH] (ref 7.35–7.45)
PLATELET # BLD AUTO: 264 10(3)UL (ref 150–450)
PO2 BLDA: 99 MM HG (ref 80–100)
PO2 BLDA: 99 MM HG (ref 80–100)
POTASSIUM SERPL-SCNC: 4.4 MMOL/L (ref 3.5–5.1)
POTASSIUM SERPL-SCNC: 4.8 MMOL/L (ref 3.5–5.1)
PROT SERPL-MCNC: 6.6 G/DL (ref 6.4–8.2)
PUNCTURE CHARGE: YES
PUNCTURE CHARGE: YES
RBC # BLD AUTO: 4.25 X10(6)UL
SAO2 % BLDA: 99 % (ref 94–100)
SAO2 % BLDA: >99 % (ref 94–100)
SODIUM SERPL-SCNC: 133 MMOL/L (ref 136–145)
SODIUM SERPL-SCNC: 136 MMOL/L (ref 136–145)
WBC # BLD AUTO: 7.5 X10(3) UL (ref 4–11)

## 2022-11-10 PROCEDURE — 99233 SBSQ HOSP IP/OBS HIGH 50: CPT | Performed by: HOSPITALIST

## 2022-11-10 PROCEDURE — 99223 1ST HOSP IP/OBS HIGH 75: CPT | Performed by: INTERNAL MEDICINE

## 2022-11-10 PROCEDURE — 99233 SBSQ HOSP IP/OBS HIGH 50: CPT | Performed by: INTERNAL MEDICINE

## 2022-11-10 RX ORDER — KETOROLAC TROMETHAMINE 15 MG/ML
15 INJECTION, SOLUTION INTRAMUSCULAR; INTRAVENOUS EVERY 6 HOURS PRN
Status: DISPENSED | OUTPATIENT
Start: 2022-11-10 | End: 2022-11-12

## 2022-11-10 RX ORDER — DEXTROSE AND SODIUM CHLORIDE 5; .9 G/100ML; G/100ML
INJECTION, SOLUTION INTRAVENOUS CONTINUOUS
Status: DISCONTINUED | OUTPATIENT
Start: 2022-11-10 | End: 2022-11-11

## 2022-11-10 NOTE — PLAN OF CARE
Patient is alert and oriented x4. On room air. VS stable. Receiving PRN ketorolac for pain. Bowel sounds present. No BM during the day. Tap water enema given. Xray of abdomen done today. Call light within reach. Appropriate safety precautions maintained. Problem: Patient Centered Care  Goal: Patient preferences are identified and integrated in the patient's plan of care  Description: Interventions:  - What would you like us to know as we care for you?  I was just discharged yesterday  - Provide timely, complete, and accurate information to patient/family  - Incorporate patient and family knowledge, values, beliefs, and cultural backgrounds into the planning and delivery of care  - Encourage patient/family to participate in care and decision-making at the level they choose  - Honor patient and family perspectives and choices  Outcome: Progressing     Problem: Diabetes/Glucose Control  Goal: Glucose maintained within prescribed range  Description: INTERVENTIONS:  - Monitor Blood Glucose as ordered  - Assess for signs and symptoms of hyperglycemia and hypoglycemia  - Administer ordered medications to maintain glucose within target range  - Assess barriers to adequate nutritional intake and initiate nutrition consult as needed  - Instruct patient on self management of diabetes  Outcome: Progressing     Problem: Patient/Family Goals  Goal: Patient/Family Long Term Goal  Description: Patient's Long Term Goal: to go home    Interventions:  - take medications as prescribed  - follow MD orders  - monitor labs  - See additional Care Plan goals for specific interventions  Outcome: Progressing  Goal: Patient/Family Short Term Goal  Description: Patient's Short Term Goal: to report pain    Interventions:   -PRN pain medications  - testing  - monitor labs  - See additional Care Plan goals for specific interventions  Outcome: Progressing     Problem: GASTROINTESTINAL - ADULT  Goal: Minimal or absence of nausea and vomiting  Description: INTERVENTIONS:  - Maintain adequate hydration with IV or PO as ordered and tolerated  - Nasogastric tube to low intermittent suction as ordered  - Evaluate effectiveness of ordered antiemetic medications  - Provide nonpharmacologic comfort measures as appropriate  - Advance diet as tolerated, if ordered  - Obtain nutritional consult as needed  - Evaluate fluid balance  Outcome: Progressing  Goal: Maintains or returns to baseline bowel function  Description: INTERVENTIONS:  - Assess bowel function  - Maintain adequate hydration with IV or PO as ordered and tolerated  - Evaluate effectiveness of GI medications  - Encourage mobilization and activity  - Obtain nutritional consult as needed  - Establish a toileting routine/schedule  - Consider collaborating with pharmacy to review patient's medication profile  Outcome: Progressing     Problem: METABOLIC/FLUID AND ELECTROLYTES - ADULT  Goal: Glucose maintained within prescribed range  Description: INTERVENTIONS:  - Monitor Blood Glucose as ordered  - Assess for signs and symptoms of hyperglycemia and hypoglycemia  - Administer ordered medications to maintain glucose within target range  - Assess barriers to adequate nutritional intake and initiate nutrition consult as needed  - Instruct patient on self management of diabetes  Outcome: Progressing

## 2022-11-10 NOTE — PLAN OF CARE
Pt alert and oriented x4, on RA, VS stable, IVF continued. Pt refused heparin overnight, reglan, and miralax. Pt felt much better after receiving an enema. Plan is for a repeat KUB, fall precaution maintained, call light within reach, will continue to monitor  Problem: Patient Centered Care  Goal: Patient preferences are identified and integrated in the patient's plan of care  Description: Interventions:  - What would you like us to know as we care for you?  I was just discharged yesterday  - Provide timely, complete, and accurate information to patient/family  - Incorporate patient and family knowledge, values, beliefs, and cultural backgrounds into the planning and delivery of care  - Encourage patient/family to participate in care and decision-making at the level they choose  - Honor patient and family perspectives and choices  Outcome: Progressing     Problem: Diabetes/Glucose Control  Goal: Glucose maintained within prescribed range  Description: INTERVENTIONS:  - Monitor Blood Glucose as ordered  - Assess for signs and symptoms of hyperglycemia and hypoglycemia  - Administer ordered medications to maintain glucose within target range  - Assess barriers to adequate nutritional intake and initiate nutrition consult as needed  - Instruct patient on self management of diabetes  Outcome: Progressing     Problem: Patient/Family Goals  Goal: Patient/Family Long Term Goal  Description: Patient's Long Term Goal: to go home    Interventions:  - take medications as prescribed  - follow MD orders  - monitor labs  - See additional Care Plan goals for specific interventions  Outcome: Progressing  Goal: Patient/Family Short Term Goal  Description: Patient's Short Term Goal: to report pain    Interventions:   -PRN pain medications  - testing  - monitor labs  - See additional Care Plan goals for specific interventions  Outcome: Progressing     Problem: GASTROINTESTINAL - ADULT  Goal: Minimal or absence of nausea and vomiting  Description: INTERVENTIONS:  - Maintain adequate hydration with IV or PO as ordered and tolerated  - Nasogastric tube to low intermittent suction as ordered  - Evaluate effectiveness of ordered antiemetic medications  - Provide nonpharmacologic comfort measures as appropriate  - Advance diet as tolerated, if ordered  - Obtain nutritional consult as needed  - Evaluate fluid balance  Outcome: Progressing  Goal: Maintains or returns to baseline bowel function  Description: INTERVENTIONS:  - Assess bowel function  - Maintain adequate hydration with IV or PO as ordered and tolerated  - Evaluate effectiveness of GI medications  - Encourage mobilization and activity  - Obtain nutritional consult as needed  - Establish a toileting routine/schedule  - Consider collaborating with pharmacy to review patient's medication profile  Outcome: Progressing     Problem: METABOLIC/FLUID AND ELECTROLYTES - ADULT  Goal: Glucose maintained within prescribed range  Description: INTERVENTIONS:  - Monitor Blood Glucose as ordered  - Assess for signs and symptoms of hyperglycemia and hypoglycemia  - Administer ordered medications to maintain glucose within target range  - Assess barriers to adequate nutritional intake and initiate nutrition consult as needed  - Instruct patient on self management of diabetes  Outcome: Progressing

## 2022-11-10 NOTE — PLAN OF CARE
Problem: Patient Centered Care  Goal: Patient preferences are identified and integrated in the patient's plan of care  Description: Interventions:  - What would you like us to know as we care for you?  I was just discharged yesterday  - Provide timely, complete, and accurate information to patient/family  - Incorporate patient and family knowledge, values, beliefs, and cultural backgrounds into the planning and delivery of care  - Encourage patient/family to participate in care and decision-making at the level they choose  - Honor patient and family perspectives and choices  Outcome: Progressing     Problem: Diabetes/Glucose Control  Goal: Glucose maintained within prescribed range  Description: INTERVENTIONS:  - Monitor Blood Glucose as ordered  - Assess for signs and symptoms of hyperglycemia and hypoglycemia  - Administer ordered medications to maintain glucose within target range  - Assess barriers to adequate nutritional intake and initiate nutrition consult as needed  - Instruct patient on self management of diabetes  Outcome: Progressing     Problem: Diabetes/Glucose Control  Goal: Glucose maintained within prescribed range  Description: INTERVENTIONS:  - Monitor Blood Glucose as ordered  - Assess for signs and symptoms of hyperglycemia and hypoglycemia  - Administer ordered medications to maintain glucose within target range  - Assess barriers to adequate nutritional intake and initiate nutrition consult as needed  - Instruct patient on self management of diabetes  Outcome: Progressing     Problem: Patient/Family Goals  Goal: Patient/Family Long Term Goal  Description: Patient's Long Term Goal: to go home    Interventions:  - take medications as prescribed  - follow MD orders  - monitor labs  - See additional Care Plan goals for specific interventions  Outcome: Progressing  Goal: Patient/Family Short Term Goal  Description: Patient's Short Term Goal: to report pain    Interventions:   -PRN pain medications  - testing  - monitor labs  - See additional Care Plan goals for specific interventions  Outcome: Progressing     Problem: GASTROINTESTINAL - ADULT  Goal: Minimal or absence of nausea and vomiting  Description: INTERVENTIONS:  - Maintain adequate hydration with IV or PO as ordered and tolerated  - Nasogastric tube to low intermittent suction as ordered  - Evaluate effectiveness of ordered antiemetic medications  - Provide nonpharmacologic comfort measures as appropriate  - Advance diet as tolerated, if ordered  - Obtain nutritional consult as needed  - Evaluate fluid balance  Outcome: Progressing  Goal: Maintains or returns to baseline bowel function  Description: INTERVENTIONS:  - Assess bowel function  - Maintain adequate hydration with IV or PO as ordered and tolerated  - Evaluate effectiveness of GI medications  - Encourage mobilization and activity  - Obtain nutritional consult as needed  - Establish a toileting routine/schedule  - Consider collaborating with pharmacy to review patient's medication profile  Outcome: Progressing     Problem: METABOLIC/FLUID AND ELECTROLYTES - ADULT  Goal: Glucose maintained within prescribed range  Description: INTERVENTIONS:  - Monitor Blood Glucose as ordered  - Assess for signs and symptoms of hyperglycemia and hypoglycemia  - Administer ordered medications to maintain glucose within target range  - Assess barriers to adequate nutritional intake and initiate nutrition consult as needed  - Instruct patient on self management of diabetes  Outcome: Progressing    Patient is alert and oriented, RA, VSS, ABG drawn Ph is down, per Dr. Margaret Goel requesting patient to be transferred to CCU on a insulin drip, orders placed for pharmacy to dose. Waiting on bed placement. Received room in CCU, report given to [facility] VSS. Also notified MD about wife wanting an update and patient experience to round with family.

## 2022-11-11 ENCOUNTER — APPOINTMENT (OUTPATIENT)
Dept: CT IMAGING | Facility: HOSPITAL | Age: 42
DRG: 329 | End: 2022-11-11
Attending: INTERNAL MEDICINE
Payer: COMMERCIAL

## 2022-11-11 ENCOUNTER — APPOINTMENT (OUTPATIENT)
Dept: CT IMAGING | Facility: HOSPITAL | Age: 42
End: 2022-11-11
Attending: INTERNAL MEDICINE
Payer: COMMERCIAL

## 2022-11-11 PROBLEM — E10.65 TYPE 1 DIABETES MELLITUS WITH HYPERGLYCEMIA (HCC): Status: ACTIVE | Noted: 2021-03-03

## 2022-11-11 LAB
ANION GAP SERPL CALC-SCNC: 11 MMOL/L (ref 0–18)
BASOPHILS # BLD AUTO: 0.05 X10(3) UL (ref 0–0.2)
BASOPHILS NFR BLD AUTO: 0.7 %
BUN BLD-MCNC: 10 MG/DL (ref 7–18)
BUN/CREAT SERPL: 11.4 (ref 10–20)
CALCIUM BLD-MCNC: 8.5 MG/DL (ref 8.5–10.1)
CHLORIDE SERPL-SCNC: 106 MMOL/L (ref 98–112)
CO2 SERPL-SCNC: 21 MMOL/L (ref 21–32)
CREAT BLD-MCNC: 0.88 MG/DL
DEPRECATED RDW RBC AUTO: 37.9 FL (ref 35.1–46.3)
EOSINOPHIL # BLD AUTO: 0.42 X10(3) UL (ref 0–0.7)
EOSINOPHIL NFR BLD AUTO: 5.9 %
ERYTHROCYTE [DISTWIDTH] IN BLOOD BY AUTOMATED COUNT: 11.2 % (ref 11–15)
GFR SERPLBLD BASED ON 1.73 SQ M-ARVRAT: 111 ML/MIN/1.73M2 (ref 60–?)
GLUCOSE BLD-MCNC: 121 MG/DL (ref 70–99)
GLUCOSE BLDC GLUCOMTR-MCNC: 117 MG/DL (ref 70–99)
GLUCOSE BLDC GLUCOMTR-MCNC: 124 MG/DL (ref 70–99)
GLUCOSE BLDC GLUCOMTR-MCNC: 129 MG/DL (ref 70–99)
GLUCOSE BLDC GLUCOMTR-MCNC: 134 MG/DL (ref 70–99)
GLUCOSE BLDC GLUCOMTR-MCNC: 162 MG/DL (ref 70–99)
GLUCOSE BLDC GLUCOMTR-MCNC: 176 MG/DL (ref 70–99)
GLUCOSE BLDC GLUCOMTR-MCNC: 188 MG/DL (ref 70–99)
GLUCOSE BLDC GLUCOMTR-MCNC: 242 MG/DL (ref 70–99)
HCT VFR BLD AUTO: 40.6 %
HGB BLD-MCNC: 14.3 G/DL
IMM GRANULOCYTES # BLD AUTO: 0.02 X10(3) UL (ref 0–1)
IMM GRANULOCYTES NFR BLD: 0.3 %
LYMPHOCYTES # BLD AUTO: 1.21 X10(3) UL (ref 1–4)
LYMPHOCYTES NFR BLD AUTO: 17 %
MAGNESIUM SERPL-MCNC: 1.6 MG/DL (ref 1.6–2.6)
MCH RBC QN AUTO: 32.1 PG (ref 26–34)
MCHC RBC AUTO-ENTMCNC: 35.2 G/DL (ref 31–37)
MCV RBC AUTO: 91 FL
MONOCYTES # BLD AUTO: 1.08 X10(3) UL (ref 0.1–1)
MONOCYTES NFR BLD AUTO: 15.1 %
NEUTROPHILS # BLD AUTO: 4.35 X10 (3) UL (ref 1.5–7.7)
NEUTROPHILS # BLD AUTO: 4.35 X10(3) UL (ref 1.5–7.7)
NEUTROPHILS NFR BLD AUTO: 61 %
OSMOLALITY SERPL CALC.SUM OF ELEC: 286 MOSM/KG (ref 275–295)
PHOSPHATE SERPL-MCNC: 1.9 MG/DL (ref 2.5–4.9)
PLATELET # BLD AUTO: 278 10(3)UL (ref 150–450)
POTASSIUM SERPL-SCNC: 3.9 MMOL/L (ref 3.5–5.1)
RBC # BLD AUTO: 4.46 X10(6)UL
SODIUM SERPL-SCNC: 138 MMOL/L (ref 136–145)
WBC # BLD AUTO: 7.1 X10(3) UL (ref 4–11)

## 2022-11-11 PROCEDURE — 74176 CT ABD & PELVIS W/O CONTRAST: CPT | Performed by: INTERNAL MEDICINE

## 2022-11-11 PROCEDURE — 99233 SBSQ HOSP IP/OBS HIGH 50: CPT | Performed by: INTERNAL MEDICINE

## 2022-11-11 PROCEDURE — 99233 SBSQ HOSP IP/OBS HIGH 50: CPT | Performed by: HOSPITALIST

## 2022-11-11 RX ORDER — MAGNESIUM OXIDE 400 MG/1
400 TABLET ORAL ONCE
Status: COMPLETED | OUTPATIENT
Start: 2022-11-11 | End: 2022-11-11

## 2022-11-11 RX ORDER — HYDROMORPHONE HYDROCHLORIDE 1 MG/ML
0.4 INJECTION, SOLUTION INTRAMUSCULAR; INTRAVENOUS; SUBCUTANEOUS EVERY 2 HOUR PRN
Status: DISCONTINUED | OUTPATIENT
Start: 2022-11-11 | End: 2022-11-15

## 2022-11-11 RX ORDER — DEXTROSE AND SODIUM CHLORIDE 5; .45 G/100ML; G/100ML
INJECTION, SOLUTION INTRAVENOUS CONTINUOUS
Status: DISCONTINUED | OUTPATIENT
Start: 2022-11-11 | End: 2022-11-12

## 2022-11-11 NOTE — PLAN OF CARE
[id number]: A/Ox4. VSS on RA. NSR on monitor. Afebrile. Pt irritable at times; upset at how long the insulin gtt has been running; comparing our accuchek results to his CGM, stating his BS is in the 90s demanding gtt be turned off because he \"feels shaky\". Accuchek 117 at this time. Pt states \"I will file a lawsuit against this hospital if I die in my sleep from low blood sugar. \" Dr Isabell Anna updated on pt's concerns; MD states okay to stop gtt & give 18u levemir; diet resumed & low SSI ordered; continue D5/NS at 100/hr until pt eating meals. Entered room to turn off gtt, to find that pt had already stopped both infusions himself; instructed pt not to touch any buttons on the pump & education provided on safety concerns regarding adjusting continuous drips. Pt refusing to take levemir at time MD wanted dose given, stating \"that dose is too high. Wait until I order breakfast so my sugar doesn't drop too low. \" Provided education on levemir and it's long-acting affects; pt continues to decline dose at this time. Trending labs. PRN Toradol & dilaudid given for ABD pain per pt request. Using urinal. No BM. Denies passing gas. Bed locked in lowest position, safety maintained. Call light within reach. Frequent nursing rounds performed. Problem: Patient Centered Care  Goal: Patient preferences are identified and integrated in the patient's plan of care  Description: Interventions:  - What would you like us to know as we care for you?  I was just discharged yesterday  - Provide timely, complete, and accurate information to patient/family  - Incorporate patient and family knowledge, values, beliefs, and cultural backgrounds into the planning and delivery of care  - Encourage patient/family to participate in care and decision-making at the level they choose  - Honor patient and family perspectives and choices  Outcome: Progressing     Problem: Diabetes/Glucose Control  Goal: Glucose maintained within prescribed range  Description: INTERVENTIONS:  - Monitor Blood Glucose as ordered  - Assess for signs and symptoms of hyperglycemia and hypoglycemia  - Administer ordered medications to maintain glucose within target range  - Assess barriers to adequate nutritional intake and initiate nutrition consult as needed  - Instruct patient on self management of diabetes  Outcome: Progressing     Problem: Patient/Family Goals  Goal: Patient/Family Long Term Goal  Description: Patient's Long Term Goal: to go home    Interventions:  - take medications as prescribed  - follow MD orders  - monitor labs  - See additional Care Plan goals for specific interventions  Outcome: Progressing  Goal: Patient/Family Short Term Goal  Description: Patient's Short Term Goal: to report pain    Interventions:   -PRN pain medications  - testing  - monitor labs  - See additional Care Plan goals for specific interventions  Outcome: Progressing     Problem: GASTROINTESTINAL - ADULT  Goal: Minimal or absence of nausea and vomiting  Description: INTERVENTIONS:  - Maintain adequate hydration with IV or PO as ordered and tolerated  - Nasogastric tube to low intermittent suction as ordered  - Evaluate effectiveness of ordered antiemetic medications  - Provide nonpharmacologic comfort measures as appropriate  - Advance diet as tolerated, if ordered  - Obtain nutritional consult as needed  - Evaluate fluid balance  Outcome: Progressing  Goal: Maintains or returns to baseline bowel function  Description: INTERVENTIONS:  - Assess bowel function  - Maintain adequate hydration with IV or PO as ordered and tolerated  - Evaluate effectiveness of GI medications  - Encourage mobilization and activity  - Obtain nutritional consult as needed  - Establish a toileting routine/schedule  - Consider collaborating with pharmacy to review patient's medication profile  Outcome: Progressing     Problem: METABOLIC/FLUID AND ELECTROLYTES - ADULT  Goal: Glucose maintained within prescribed range  Description: INTERVENTIONS:  - Monitor Blood Glucose as ordered  - Assess for signs and symptoms of hyperglycemia and hypoglycemia  - Administer ordered medications to maintain glucose within target range  - Assess barriers to adequate nutritional intake and initiate nutrition consult as needed  - Instruct patient on self management of diabetes  Outcome: Progressing

## 2022-11-11 NOTE — PLAN OF CARE
Received pt alert and oriented, on room air. Insulin gtt started per endocrinology. Pt NPO. Pt independent of all ADLs. ABG repeated with improved pH. Bed locked and in lowest position, call light within reach. Spouse at bedside and update on plan of care. Problem: Patient Centered Care  Goal: Patient preferences are identified and integrated in the patient's plan of care  Description: Interventions:  - What would you like us to know as we care for you?  I was just discharged yesterday  - Provide timely, complete, and accurate information to patient/family  - Incorporate patient and family knowledge, values, beliefs, and cultural backgrounds into the planning and delivery of care  - Encourage patient/family to participate in care and decision-making at the level they choose  - Honor patient and family perspectives and choices  Outcome: Progressing     Problem: Diabetes/Glucose Control  Goal: Glucose maintained within prescribed range  Description: INTERVENTIONS:  - Monitor Blood Glucose as ordered  - Assess for signs and symptoms of hyperglycemia and hypoglycemia  - Administer ordered medications to maintain glucose within target range  - Assess barriers to adequate nutritional intake and initiate nutrition consult as needed  - Instruct patient on self management of diabetes  Outcome: Progressing     Problem: Patient/Family Goals  Goal: Patient/Family Long Term Goal  Description: Patient's Long Term Goal: to go home    Interventions:  - take medications as prescribed  - follow MD orders  - monitor labs  - See additional Care Plan goals for specific interventions  Outcome: Progressing  Goal: Patient/Family Short Term Goal  Description: Patient's Short Term Goal: to report pain    Interventions:   -PRN pain medications  - testing  - monitor labs  - See additional Care Plan goals for specific interventions  Outcome: Progressing     Problem: GASTROINTESTINAL - ADULT  Goal: Minimal or absence of nausea and vomiting  Description: INTERVENTIONS:  - Maintain adequate hydration with IV or PO as ordered and tolerated  - Nasogastric tube to low intermittent suction as ordered  - Evaluate effectiveness of ordered antiemetic medications  - Provide nonpharmacologic comfort measures as appropriate  - Advance diet as tolerated, if ordered  - Obtain nutritional consult as needed  - Evaluate fluid balance  Outcome: Progressing  Goal: Maintains or returns to baseline bowel function  Description: INTERVENTIONS:  - Assess bowel function  - Maintain adequate hydration with IV or PO as ordered and tolerated  - Evaluate effectiveness of GI medications  - Encourage mobilization and activity  - Obtain nutritional consult as needed  - Establish a toileting routine/schedule  - Consider collaborating with pharmacy to review patient's medication profile  Outcome: Progressing     Problem: METABOLIC/FLUID AND ELECTROLYTES - ADULT  Goal: Glucose maintained within prescribed range  Description: INTERVENTIONS:  - Monitor Blood Glucose as ordered  - Assess for signs and symptoms of hyperglycemia and hypoglycemia  - Administer ordered medications to maintain glucose within target range  - Assess barriers to adequate nutritional intake and initiate nutrition consult as needed  - Instruct patient on self management of diabetes  Outcome: Progressing

## 2022-11-12 ENCOUNTER — APPOINTMENT (OUTPATIENT)
Dept: GENERAL RADIOLOGY | Facility: HOSPITAL | Age: 42
End: 2022-11-12
Attending: HOSPITALIST
Payer: COMMERCIAL

## 2022-11-12 ENCOUNTER — APPOINTMENT (OUTPATIENT)
Dept: GENERAL RADIOLOGY | Facility: HOSPITAL | Age: 42
DRG: 329 | End: 2022-11-12
Attending: HOSPITALIST
Payer: COMMERCIAL

## 2022-11-12 LAB
ALBUMIN SERPL-MCNC: 3 G/DL (ref 3.4–5)
ALBUMIN/GLOB SERPL: 0.9 {RATIO} (ref 1–2)
ALP LIVER SERPL-CCNC: 53 U/L
ALT SERPL-CCNC: 17 U/L
ANION GAP SERPL CALC-SCNC: 14 MMOL/L (ref 0–18)
AST SERPL-CCNC: 11 U/L (ref 15–37)
BASOPHILS # BLD AUTO: 0.06 X10(3) UL (ref 0–0.2)
BASOPHILS NFR BLD AUTO: 0.7 %
BILIRUB SERPL-MCNC: 1 MG/DL (ref 0.1–2)
BUN BLD-MCNC: 12 MG/DL (ref 7–18)
BUN/CREAT SERPL: 13.2 (ref 10–20)
CALCIUM BLD-MCNC: 8.4 MG/DL (ref 8.5–10.1)
CHLORIDE SERPL-SCNC: 101 MMOL/L (ref 98–112)
CO2 SERPL-SCNC: 19 MMOL/L (ref 21–32)
CREAT BLD-MCNC: 0.91 MG/DL
DEPRECATED RDW RBC AUTO: 40.3 FL (ref 35.1–46.3)
EOSINOPHIL # BLD AUTO: 0.13 X10(3) UL (ref 0–0.7)
EOSINOPHIL NFR BLD AUTO: 1.5 %
ERYTHROCYTE [DISTWIDTH] IN BLOOD BY AUTOMATED COUNT: 11.7 % (ref 11–15)
GFR SERPLBLD BASED ON 1.73 SQ M-ARVRAT: 109 ML/MIN/1.73M2 (ref 60–?)
GLOBULIN PLAS-MCNC: 3.5 G/DL (ref 2.8–4.4)
GLUCOSE BLD-MCNC: 363 MG/DL (ref 70–99)
GLUCOSE BLDC GLUCOMTR-MCNC: 134 MG/DL (ref 70–99)
GLUCOSE BLDC GLUCOMTR-MCNC: 171 MG/DL (ref 70–99)
GLUCOSE BLDC GLUCOMTR-MCNC: 189 MG/DL (ref 70–99)
GLUCOSE BLDC GLUCOMTR-MCNC: 307 MG/DL (ref 70–99)
GLUCOSE BLDC GLUCOMTR-MCNC: 315 MG/DL (ref 70–99)
HCT VFR BLD AUTO: 41.6 %
HGB BLD-MCNC: 14.3 G/DL
IMM GRANULOCYTES # BLD AUTO: 0.02 X10(3) UL (ref 0–1)
IMM GRANULOCYTES NFR BLD: 0.2 %
LYMPHOCYTES # BLD AUTO: 0.73 X10(3) UL (ref 1–4)
LYMPHOCYTES NFR BLD AUTO: 8.4 %
MAGNESIUM SERPL-MCNC: 1.7 MG/DL (ref 1.6–2.6)
MCH RBC QN AUTO: 32.2 PG (ref 26–34)
MCHC RBC AUTO-ENTMCNC: 34.4 G/DL (ref 31–37)
MCV RBC AUTO: 93.7 FL
MONOCYTES # BLD AUTO: 0.99 X10(3) UL (ref 0.1–1)
MONOCYTES NFR BLD AUTO: 11.4 %
NEUTROPHILS # BLD AUTO: 6.72 X10 (3) UL (ref 1.5–7.7)
NEUTROPHILS # BLD AUTO: 6.72 X10(3) UL (ref 1.5–7.7)
NEUTROPHILS NFR BLD AUTO: 77.8 %
OSMOLALITY SERPL CALC.SUM OF ELEC: 292 MOSM/KG (ref 275–295)
PLATELET # BLD AUTO: 272 10(3)UL (ref 150–450)
POTASSIUM SERPL-SCNC: 4.4 MMOL/L (ref 3.5–5.1)
PROT SERPL-MCNC: 6.5 G/DL (ref 6.4–8.2)
RBC # BLD AUTO: 4.44 X10(6)UL
SODIUM SERPL-SCNC: 134 MMOL/L (ref 136–145)
WBC # BLD AUTO: 8.7 X10(3) UL (ref 4–11)

## 2022-11-12 PROCEDURE — 99254 IP/OBS CNSLTJ NEW/EST MOD 60: CPT | Performed by: SURGERY

## 2022-11-12 PROCEDURE — 99233 SBSQ HOSP IP/OBS HIGH 50: CPT | Performed by: HOSPITALIST

## 2022-11-12 PROCEDURE — 71045 X-RAY EXAM CHEST 1 VIEW: CPT | Performed by: HOSPITALIST

## 2022-11-12 PROCEDURE — 99232 SBSQ HOSP IP/OBS MODERATE 35: CPT | Performed by: INTERNAL MEDICINE

## 2022-11-12 RX ORDER — MAGNESIUM SULFATE HEPTAHYDRATE 40 MG/ML
2 INJECTION, SOLUTION INTRAVENOUS ONCE
Status: COMPLETED | OUTPATIENT
Start: 2022-11-12 | End: 2022-11-12

## 2022-11-12 RX ORDER — SODIUM CHLORIDE 9 MG/ML
INJECTION, SOLUTION INTRAVENOUS CONTINUOUS
Status: DISCONTINUED | OUTPATIENT
Start: 2022-11-12 | End: 2022-11-14

## 2022-11-12 NOTE — PROGRESS NOTES
Patient is alert oriented and argumentative. Patient CT results came back.  notified with results. Patient NPO and NG ordered to be placed. Patient refusing evening medications and NGT placement.  notified . [facility] at [facility].           11/11/22 2000   Charting Type   Charting Type Shift assessment   Neuro Assessment   Neuro (WDL) WDL   Level of Consciousness Alert   Orientation Level Oriented X4   Cognition Appropriate judgement; Appropriate safety awareness; Appropriate attention/concentration   Speech Clear   [place] Coma Scale   Eye Opening 4   Best Verbal Response 5   Best Motor Response 6   [place] Coma Scale Score 15   HEENT   HEENT (WDL) WDL   Respiratory   Respiratory (WDL) WDL   Respiratory Pattern Regular;Easy;Unlabored; No dyspnea   Chest Assessment Chest expansion symmetrical   Bilateral Breath Sounds Clear   Cough None   Cardiac   Cardiac (WDL) WDL   Cardiac Regularity Regular   Cardiac Rhythm NSR   Cardiac Symptoms None   Peripheral Vascular   Peripheral Vascular (WDL) WDL   Cyanosis None   Capillary Refill Less than 3 seconds (All extremities)   Pulses R radial;L radial;R pedal;L pedal   R Radial Pulse +2   L Radial Pulse +2   R Pedal Pulse +2   L Pedal Pulse +2   Integumentary   Integumentary (WDL) WDL   [facility]   Date first assessed/Time first assessed: 11/05/22 2038   Pre-existing: Yes  Orientation: Lateral;Right  Location: Abdomen   Site Location RLQ Abdomen   Site assessment Clean;Dry; Intact   Musculoskeletal   Musculoskeletal (WDL) WDL   Gastrointestinal   Gastrointestinal (WDL) X   Abdomen Inspection Soft;Flat;Nondistended   Bowel Sounds (All Quadrants) Present   Tenderness Soft;Tenderness; No guarding   Belching No   Passing Flatus No   Genitourinary   Genitourinary (WDL) WDL   Genitourinary Symptoms None   Psychosocial   Psychosocial (WDL) WDL   Mood and Affect   Mood or Feelings Irritability   Speech   Rate of Speech Appropriate

## 2022-11-12 NOTE — PLAN OF CARE
Patient requested dilaudid x2 for severe (8/10, 9/10) left sided abdominal pain. PCU nurse explained to me that the hospitalist wanted the patient to have an NGT and to be made NPO prior to transfer to the 5th floor. Patient refused NGT for PCU nurse. When patient arrived to the unit I explained the purpose of the NGT as well as the steps for insertion and that patient refused. He wants to speak to his doctors and also wants to be sedated for NGT insertion. I contacted Dr. Paris Read for new insulin orders d/t the patient being NPO. Patient started on IVF. and q6 accuchecks. Patient reported that he has not had a BM since 11/9 after an enema and he reports that he is not passing flatus. Problem: Patient Centered Care  Goal: Patient preferences are identified and integrated in the patient's plan of care  Description: Interventions:  - What would you like us to know as we care for you?  I was just discharged yesterday  - Provide timely, complete, and accurate information to patient/family  - Incorporate patient and family knowledge, values, beliefs, and cultural backgrounds into the planning and delivery of care  - Encourage patient/family to participate in care and decision-making at the level they choose  - Honor patient and family perspectives and choices  Outcome: Progressing     Problem: Diabetes/Glucose Control  Goal: Glucose maintained within prescribed range  Description: INTERVENTIONS:  - Monitor Blood Glucose as ordered  - Assess for signs and symptoms of hyperglycemia and hypoglycemia  - Administer ordered medications to maintain glucose within target range  - Assess barriers to adequate nutritional intake and initiate nutrition consult as needed  - Instruct patient on self management of diabetes  Outcome: Progressing     Problem: Patient/Family Goals  Goal: Patient/Family Long Term Goal  Description: Patient's Long Term Goal: to go home    Interventions:  - take medications as prescribed  - follow MD orders  - monitor labs  - See additional Care Plan goals for specific interventions  Outcome: Progressing  Goal: Patient/Family Short Term Goal  Description: Patient's Short Term Goal: to report pain    Interventions:   -PRN pain medications  - testing  - monitor labs  - See additional Care Plan goals for specific interventions  Outcome: Progressing     Problem: GASTROINTESTINAL - ADULT  Goal: Minimal or absence of nausea and vomiting  Description: INTERVENTIONS:  - Maintain adequate hydration with IV or PO as ordered and tolerated  - Nasogastric tube to low intermittent suction as ordered  - Evaluate effectiveness of ordered antiemetic medications  - Provide nonpharmacologic comfort measures as appropriate  - Advance diet as tolerated, if ordered  - Obtain nutritional consult as needed  - Evaluate fluid balance  Outcome: Progressing  Goal: Maintains or returns to baseline bowel function  Description: INTERVENTIONS:  - Assess bowel function  - Maintain adequate hydration with IV or PO as ordered and tolerated  - Evaluate effectiveness of GI medications  - Encourage mobilization and activity  - Obtain nutritional consult as needed  - Establish a toileting routine/schedule  - Consider collaborating with pharmacy to review patient's medication profile  Outcome: Progressing     Problem: METABOLIC/FLUID AND ELECTROLYTES - ADULT  Goal: Glucose maintained within prescribed range  Description: INTERVENTIONS:  - Monitor Blood Glucose as ordered  - Assess for signs and symptoms of hyperglycemia and hypoglycemia  - Administer ordered medications to maintain glucose within target range  - Assess barriers to adequate nutritional intake and initiate nutrition consult as needed  - Instruct patient on self management of diabetes  Outcome: Progressing

## 2022-11-12 NOTE — PLAN OF CARE
Patient still without bowel movement. He agreed to placement of NG, now placed to low intermittent suction, draining bilious drainage. Pain controlled with requested Dilaudid. PPN to start tomorrow pending PICC line placement on Monday for TPN. Safety precautions maintained.     Problem: Diabetes/Glucose Control  Goal: Glucose maintained within prescribed range  Description: INTERVENTIONS:  - Monitor Blood Glucose as ordered  - Assess for signs and symptoms of hyperglycemia and hypoglycemia  - Administer ordered medications to maintain glucose within target range  - Assess barriers to adequate nutritional intake and initiate nutrition consult as needed  - Instruct patient on self management of diabetes  Outcome: Progressing     Problem: Patient/Family Goals  Goal: Patient/Family Long Term Goal  Description: Patient's Long Term Goal: to go home    Interventions:  - take medications as prescribed  - follow MD orders  - monitor labs  - See additional Care Plan goals for specific interventions  Outcome: Progressing  Goal: Patient/Family Short Term Goal  Description: Patient's Short Term Goal: to report pain    Interventions:   -PRN pain medications  - testing  - monitor labs  - See additional Care Plan goals for specific interventions  Outcome: Progressing     Problem: GASTROINTESTINAL - ADULT  Goal: Minimal or absence of nausea and vomiting  Description: INTERVENTIONS:  - Maintain adequate hydration with IV or PO as ordered and tolerated  - Nasogastric tube to low intermittent suction as ordered  - Evaluate effectiveness of ordered antiemetic medications  - Provide nonpharmacologic comfort measures as appropriate  - Advance diet as tolerated, if ordered  - Obtain nutritional consult as needed  - Evaluate fluid balance  Outcome: Progressing     Problem: METABOLIC/FLUID AND ELECTROLYTES - ADULT  Goal: Glucose maintained within prescribed range  Description: INTERVENTIONS:  - Monitor Blood Glucose as ordered  - Assess for signs and symptoms of hyperglycemia and hypoglycemia  - Administer ordered medications to maintain glucose within target range  - Assess barriers to adequate nutritional intake and initiate nutrition consult as needed  - Instruct patient on self management of diabetes  Outcome: Progressing

## 2022-11-13 LAB
ALBUMIN SERPL-MCNC: 2.8 G/DL (ref 3.4–5)
ANION GAP SERPL CALC-SCNC: 8 MMOL/L (ref 0–18)
BASOPHILS # BLD AUTO: 0.05 X10(3) UL (ref 0–0.2)
BASOPHILS NFR BLD AUTO: 0.7 %
BUN BLD-MCNC: 17 MG/DL (ref 7–18)
BUN/CREAT SERPL: 21.5 (ref 10–20)
CALCIUM BLD-MCNC: 8.6 MG/DL (ref 8.5–10.1)
CHLORIDE SERPL-SCNC: 106 MMOL/L (ref 98–112)
CO2 SERPL-SCNC: 23 MMOL/L (ref 21–32)
CREAT BLD-MCNC: 0.79 MG/DL
DEPRECATED RDW RBC AUTO: 42 FL (ref 35.1–46.3)
EOSINOPHIL # BLD AUTO: 0.03 X10(3) UL (ref 0–0.7)
EOSINOPHIL NFR BLD AUTO: 0.4 %
ERYTHROCYTE [DISTWIDTH] IN BLOOD BY AUTOMATED COUNT: 11.9 % (ref 11–15)
GFR SERPLBLD BASED ON 1.73 SQ M-ARVRAT: 114 ML/MIN/1.73M2 (ref 60–?)
GLUCOSE BLD-MCNC: 202 MG/DL (ref 70–99)
GLUCOSE BLDC GLUCOMTR-MCNC: 170 MG/DL (ref 70–99)
GLUCOSE BLDC GLUCOMTR-MCNC: 195 MG/DL (ref 70–99)
GLUCOSE BLDC GLUCOMTR-MCNC: 212 MG/DL (ref 70–99)
HCT VFR BLD AUTO: 44 %
HGB BLD-MCNC: 14.4 G/DL
IMM GRANULOCYTES # BLD AUTO: 0.03 X10(3) UL (ref 0–1)
IMM GRANULOCYTES NFR BLD: 0.4 %
LYMPHOCYTES # BLD AUTO: 0.46 X10(3) UL (ref 1–4)
LYMPHOCYTES NFR BLD AUTO: 6.5 %
MAGNESIUM SERPL-MCNC: 2 MG/DL (ref 1.6–2.6)
MAGNESIUM SERPL-MCNC: 2.1 MG/DL (ref 1.6–2.6)
MCH RBC QN AUTO: 31.4 PG (ref 26–34)
MCHC RBC AUTO-ENTMCNC: 32.7 G/DL (ref 31–37)
MCV RBC AUTO: 96.1 FL
MONOCYTES # BLD AUTO: 1.28 X10(3) UL (ref 0.1–1)
MONOCYTES NFR BLD AUTO: 18.1 %
NEUTROPHILS # BLD AUTO: 5.23 X10 (3) UL (ref 1.5–7.7)
NEUTROPHILS # BLD AUTO: 5.23 X10(3) UL (ref 1.5–7.7)
NEUTROPHILS NFR BLD AUTO: 73.9 %
OSMOLALITY SERPL CALC.SUM OF ELEC: 291 MOSM/KG (ref 275–295)
PHOSPHATE SERPL-MCNC: 3.2 MG/DL (ref 2.5–4.9)
PLATELET # BLD AUTO: 307 10(3)UL (ref 150–450)
POTASSIUM SERPL-SCNC: 4.5 MMOL/L (ref 3.5–5.1)
RBC # BLD AUTO: 4.58 X10(6)UL
SODIUM SERPL-SCNC: 137 MMOL/L (ref 136–145)
WBC # BLD AUTO: 7.1 X10(3) UL (ref 4–11)

## 2022-11-13 PROCEDURE — 99232 SBSQ HOSP IP/OBS MODERATE 35: CPT | Performed by: SURGERY

## 2022-11-13 PROCEDURE — 99233 SBSQ HOSP IP/OBS HIGH 50: CPT | Performed by: HOSPITALIST

## 2022-11-13 PROCEDURE — 99233 SBSQ HOSP IP/OBS HIGH 50: CPT | Performed by: INTERNAL MEDICINE

## 2022-11-13 PROCEDURE — 99232 SBSQ HOSP IP/OBS MODERATE 35: CPT | Performed by: INTERNAL MEDICINE

## 2022-11-13 RX ORDER — KETOROLAC TROMETHAMINE 30 MG/ML
30 INJECTION, SOLUTION INTRAMUSCULAR; INTRAVENOUS EVERY 6 HOURS PRN
Status: DISPENSED | OUTPATIENT
Start: 2022-11-13 | End: 2022-11-15

## 2022-11-13 NOTE — PLAN OF CARE
Problem: ALTERED NUTRIENT INTAKE - ADULT  Goal: Nutrient intake appropriate for improving, restoring or maintaining nutritional needs  Description: INTERVENTIONS:  - Assess nutritional status and recommend course of action  - Monitor oral intake when eating, labs, and treatment plans  - Recommend appropriate diets, oral nutritional supplements, and vitamin/mineral supplements  -onitor TPN/PPN based on assessed needs  - Provide specific nutrition education as appropriate  Outcome: Progressing

## 2022-11-13 NOTE — PLAN OF CARE
NG tube continues to low intermittent suction, patient tolerating well. Pain controlled with ordered pain medications. Wife at bedside. PPN to begin tonight. Anticipating PICC placement tomorrow for TPN. Possible surgical laproscopy on Tuesday. Safety precautions maintained. Problem: Patient Centered Care  Goal: Patient preferences are identified and integrated in the patient's plan of care  Description: Interventions:  - What would you like us to know as we care for you?  I was just discharged yesterday  - Provide timely, complete, and accurate information to patient/family  - Incorporate patient and family knowledge, values, beliefs, and cultural backgrounds into the planning and delivery of care  - Encourage patient/family to participate in care and decision-making at the level they choose  - Honor patient and family perspectives and choices  Outcome: Progressing     Problem: Diabetes/Glucose Control  Goal: Glucose maintained within prescribed range  Description: INTERVENTIONS:  - Monitor Blood Glucose as ordered  - Assess for signs and symptoms of hyperglycemia and hypoglycemia  - Administer ordered medications to maintain glucose within target range  - Assess barriers to adequate nutritional intake and initiate nutrition consult as needed  - Instruct patient on self management of diabetes  Outcome: Progressing     Problem: Patient/Family Goals  Goal: Patient/Family Long Term Goal  Description: Patient's Long Term Goal: to go home    Interventions:  - take medications as prescribed  - follow MD orders  - monitor labs  - See additional Care Plan goals for specific interventions  Outcome: Progressing  Goal: Patient/Family Short Term Goal  Description: Patient's Short Term Goal: to report pain    Interventions:   -PRN pain medications  - testing  - monitor labs  - See additional Care Plan goals for specific interventions  Outcome: Progressing     Problem: GASTROINTESTINAL - ADULT  Goal: Minimal or absence of nausea and vomiting  Description: INTERVENTIONS:  - Maintain adequate hydration with IV or PO as ordered and tolerated  - Nasogastric tube to low intermittent suction as ordered  - Evaluate effectiveness of ordered antiemetic medications  - Provide nonpharmacologic comfort measures as appropriate  - Advance diet as tolerated, if ordered  - Obtain nutritional consult as needed  - Evaluate fluid balance  Outcome: Progressing  Goal: Maintains or returns to baseline bowel function  Description: INTERVENTIONS:  - Assess bowel function  - Maintain adequate hydration with IV or PO as ordered and tolerated  - Evaluate effectiveness of GI medications  - Encourage mobilization and activity  - Obtain nutritional consult as needed  - Establish a toileting routine/schedule  - Consider collaborating with pharmacy to review patient's medication profile  Outcome: Progressing     Problem: METABOLIC/FLUID AND ELECTROLYTES - ADULT  Goal: Glucose maintained within prescribed range  Description: INTERVENTIONS:  - Monitor Blood Glucose as ordered  - Assess for signs and symptoms of hyperglycemia and hypoglycemia  - Administer ordered medications to maintain glucose within target range  - Assess barriers to adequate nutritional intake and initiate nutrition consult as needed  - Instruct patient on self management of diabetes  Outcome: Progressing

## 2022-11-13 NOTE — PLAN OF CARE
Patient was receptive to taking his PM dose of Miralax last night and reported some \"belly grumbling\", but he did not have any bowel movements last night. NGT continues to low intermittent suction, he has had good gastric output, the color is brown and has a fecal odor. Patient's wife stayed overnight with him. Patient requested dilaudid frequently for throat pain and epigastric pain, encouraged patient to try oxy IR that is ordered prn, he declined. Patient's wife left the bedside this morning to check in with the couple's children and plans to return at 9am to be present when the doctors round. Problem: Patient Centered Care  Goal: Patient preferences are identified and integrated in the patient's plan of care  Description: Interventions:  - What would you like us to know as we care for you?  I was just discharged yesterday  - Provide timely, complete, and accurate information to patient/family  - Incorporate patient and family knowledge, values, beliefs, and cultural backgrounds into the planning and delivery of care  - Encourage patient/family to participate in care and decision-making at the level they choose  - Honor patient and family perspectives and choices  Outcome: Progressing     Problem: Diabetes/Glucose Control  Goal: Glucose maintained within prescribed range  Description: INTERVENTIONS:  - Monitor Blood Glucose as ordered  - Assess for signs and symptoms of hyperglycemia and hypoglycemia  - Administer ordered medications to maintain glucose within target range  - Assess barriers to adequate nutritional intake and initiate nutrition consult as needed  - Instruct patient on self management of diabetes  Outcome: Progressing     Problem: Patient/Family Goals  Goal: Patient/Family Long Term Goal  Description: Patient's Long Term Goal: to go home    Interventions:  - take medications as prescribed  - follow MD orders  - monitor labs  - See additional Care Plan goals for specific interventions  Outcome: Progressing  Goal: Patient/Family Short Term Goal  Description: Patient's Short Term Goal: to report pain    Interventions:   -PRN pain medications  - testing  - monitor labs  - See additional Care Plan goals for specific interventions  Outcome: Progressing     Problem: GASTROINTESTINAL - ADULT  Goal: Minimal or absence of nausea and vomiting  Description: INTERVENTIONS:  - Maintain adequate hydration with IV or PO as ordered and tolerated  - Nasogastric tube to low intermittent suction as ordered  - Evaluate effectiveness of ordered antiemetic medications  - Provide nonpharmacologic comfort measures as appropriate  - Advance diet as tolerated, if ordered  - Obtain nutritional consult as needed  - Evaluate fluid balance  Outcome: Progressing  Goal: Maintains or returns to baseline bowel function  Description: INTERVENTIONS:  - Assess bowel function  - Maintain adequate hydration with IV or PO as ordered and tolerated  - Evaluate effectiveness of GI medications  - Encourage mobilization and activity  - Obtain nutritional consult as needed  - Establish a toileting routine/schedule  - Consider collaborating with pharmacy to review patient's medication profile  Outcome: Not Progressing     Problem: METABOLIC/FLUID AND ELECTROLYTES - ADULT  Goal: Glucose maintained within prescribed range  Description: INTERVENTIONS:  - Monitor Blood Glucose as ordered  - Assess for signs and symptoms of hyperglycemia and hypoglycemia  - Administer ordered medications to maintain glucose within target range  - Assess barriers to adequate nutritional intake and initiate nutrition consult as needed  - Instruct patient on self management of diabetes  Outcome: Progressing

## 2022-11-14 ENCOUNTER — APPOINTMENT (OUTPATIENT)
Dept: GENERAL RADIOLOGY | Facility: HOSPITAL | Age: 42
DRG: 329 | End: 2022-11-14
Attending: SURGERY
Payer: COMMERCIAL

## 2022-11-14 ENCOUNTER — APPOINTMENT (OUTPATIENT)
Dept: PICC SERVICES | Facility: HOSPITAL | Age: 42
End: 2022-11-14
Attending: HOSPITALIST
Payer: COMMERCIAL

## 2022-11-14 ENCOUNTER — APPOINTMENT (OUTPATIENT)
Dept: PICC SERVICES | Facility: HOSPITAL | Age: 42
DRG: 329 | End: 2022-11-14
Attending: HOSPITALIST
Payer: COMMERCIAL

## 2022-11-14 ENCOUNTER — APPOINTMENT (OUTPATIENT)
Dept: GENERAL RADIOLOGY | Facility: HOSPITAL | Age: 42
End: 2022-11-14
Attending: SURGERY
Payer: COMMERCIAL

## 2022-11-14 LAB
ANION GAP SERPL CALC-SCNC: 9 MMOL/L (ref 0–18)
BUN BLD-MCNC: 24 MG/DL (ref 7–18)
BUN/CREAT SERPL: 32.9 (ref 10–20)
CALCIUM BLD-MCNC: 8.6 MG/DL (ref 8.5–10.1)
CHLORIDE SERPL-SCNC: 104 MMOL/L (ref 98–112)
CO2 SERPL-SCNC: 25 MMOL/L (ref 21–32)
CREAT BLD-MCNC: 0.73 MG/DL
GFR SERPLBLD BASED ON 1.73 SQ M-ARVRAT: 117 ML/MIN/1.73M2 (ref 60–?)
GLUCOSE BLD-MCNC: 298 MG/DL (ref 70–99)
GLUCOSE BLDC GLUCOMTR-MCNC: 234 MG/DL (ref 70–99)
GLUCOSE BLDC GLUCOMTR-MCNC: 244 MG/DL (ref 70–99)
GLUCOSE BLDC GLUCOMTR-MCNC: 247 MG/DL (ref 70–99)
GLUCOSE BLDC GLUCOMTR-MCNC: 261 MG/DL (ref 70–99)
MAGNESIUM SERPL-MCNC: 2 MG/DL (ref 1.6–2.6)
OSMOLALITY SERPL CALC.SUM OF ELEC: 301 MOSM/KG (ref 275–295)
PHOSPHATE SERPL-MCNC: 2.1 MG/DL (ref 2.5–4.9)
POTASSIUM SERPL-SCNC: 4.1 MMOL/L (ref 3.5–5.1)
SODIUM SERPL-SCNC: 138 MMOL/L (ref 136–145)
TRIGL SERPL-MCNC: 113 MG/DL (ref 30–149)

## 2022-11-14 PROCEDURE — 02HV33Z INSERTION OF INFUSION DEVICE INTO SUPERIOR VENA CAVA, PERCUTANEOUS APPROACH: ICD-10-PCS | Performed by: HOSPITALIST

## 2022-11-14 PROCEDURE — 74250 X-RAY XM SM INT 1CNTRST STD: CPT | Performed by: SURGERY

## 2022-11-14 PROCEDURE — 99232 SBSQ HOSP IP/OBS MODERATE 35: CPT | Performed by: SURGERY

## 2022-11-14 PROCEDURE — 99233 SBSQ HOSP IP/OBS HIGH 50: CPT | Performed by: HOSPITALIST

## 2022-11-14 PROCEDURE — B5181ZA FLUOROSCOPY OF SUPERIOR VENA CAVA USING LOW OSMOLAR CONTRAST, GUIDANCE: ICD-10-PCS | Performed by: HOSPITALIST

## 2022-11-14 PROCEDURE — 99233 SBSQ HOSP IP/OBS HIGH 50: CPT | Performed by: INTERNAL MEDICINE

## 2022-11-14 RX ORDER — LIDOCAINE HYDROCHLORIDE 10 MG/ML
5 INJECTION, SOLUTION EPIDURAL; INFILTRATION; INTRACAUDAL; PERINEURAL
Status: COMPLETED | OUTPATIENT
Start: 2022-11-14 | End: 2022-11-14

## 2022-11-14 RX ORDER — LORAZEPAM 2 MG/ML
1 INJECTION INTRAMUSCULAR 2 TIMES DAILY
Status: DISCONTINUED | OUTPATIENT
Start: 2022-11-14 | End: 2022-11-23

## 2022-11-14 NOTE — PLAN OF CARE
No acute changes in the patient's status overnight. NGT maintained to low-intermittent suction. NGT output is now green in color. Patient started on PPN. Problem: Patient Centered Care  Goal: Patient preferences are identified and integrated in the patient's plan of care  Description: Interventions:  - What would you like us to know as we care for you?  I was just discharged yesterday  - Provide timely, complete, and accurate information to patient/family  - Incorporate patient and family knowledge, values, beliefs, and cultural backgrounds into the planning and delivery of care  - Encourage patient/family to participate in care and decision-making at the level they choose  - Honor patient and family perspectives and choices  Outcome: Progressing     Problem: Diabetes/Glucose Control  Goal: Glucose maintained within prescribed range  Description: INTERVENTIONS:  - Monitor Blood Glucose as ordered  - Assess for signs and symptoms of hyperglycemia and hypoglycemia  - Administer ordered medications to maintain glucose within target range  - Assess barriers to adequate nutritional intake and initiate nutrition consult as needed  - Instruct patient on self management of diabetes  Outcome: Progressing     Problem: Patient/Family Goals  Goal: Patient/Family Long Term Goal  Description: Patient's Long Term Goal: to go home    Interventions:  - take medications as prescribed  - follow MD orders  - monitor labs  - See additional Care Plan goals for specific interventions  Outcome: Progressing  Goal: Patient/Family Short Term Goal  Description: Patient's Short Term Goal: to report pain    Interventions:   -PRN pain medications  - testing  - monitor labs  - See additional Care Plan goals for specific interventions  Outcome: Progressing     Problem: GASTROINTESTINAL - ADULT  Goal: Minimal or absence of nausea and vomiting  Description: INTERVENTIONS:  - Maintain adequate hydration with IV or PO as ordered and tolerated  - Nasogastric tube to low intermittent suction as ordered  - Evaluate effectiveness of ordered antiemetic medications  - Provide nonpharmacologic comfort measures as appropriate  - Advance diet as tolerated, if ordered  - Obtain nutritional consult as needed  - Evaluate fluid balance  Outcome: Progressing  Goal: Maintains or returns to baseline bowel function  Description: INTERVENTIONS:  - Assess bowel function  - Maintain adequate hydration with IV or PO as ordered and tolerated  - Evaluate effectiveness of GI medications  - Encourage mobilization and activity  - Obtain nutritional consult as needed  - Establish a toileting routine/schedule  - Consider collaborating with pharmacy to review patient's medication profile  Outcome: Progressing     Problem: METABOLIC/FLUID AND ELECTROLYTES - ADULT  Goal: Glucose maintained within prescribed range  Description: INTERVENTIONS:  - Monitor Blood Glucose as ordered  - Assess for signs and symptoms of hyperglycemia and hypoglycemia  - Administer ordered medications to maintain glucose within target range  - Assess barriers to adequate nutritional intake and initiate nutrition consult as needed  - Instruct patient on self management of diabetes  Outcome: Progressing

## 2022-11-15 ENCOUNTER — APPOINTMENT (OUTPATIENT)
Dept: GENERAL RADIOLOGY | Facility: HOSPITAL | Age: 42
DRG: 329 | End: 2022-11-15
Attending: SURGERY
Payer: COMMERCIAL

## 2022-11-15 ENCOUNTER — PATIENT OUTREACH (OUTPATIENT)
Dept: CASE MANAGEMENT | Age: 42
End: 2022-11-15

## 2022-11-15 ENCOUNTER — APPOINTMENT (OUTPATIENT)
Dept: GENERAL RADIOLOGY | Facility: HOSPITAL | Age: 42
End: 2022-11-15
Attending: SURGERY
Payer: COMMERCIAL

## 2022-11-15 LAB
ANION GAP SERPL CALC-SCNC: 3 MMOL/L (ref 0–18)
BUN BLD-MCNC: 23 MG/DL (ref 7–18)
BUN/CREAT SERPL: 31.1 (ref 10–20)
CALCIUM BLD-MCNC: 8.1 MG/DL (ref 8.5–10.1)
CHLORIDE SERPL-SCNC: 107 MMOL/L (ref 98–112)
CO2 SERPL-SCNC: 31 MMOL/L (ref 21–32)
CREAT BLD-MCNC: 0.74 MG/DL
GFR SERPLBLD BASED ON 1.73 SQ M-ARVRAT: 117 ML/MIN/1.73M2 (ref 60–?)
GLUCOSE BLD-MCNC: 244 MG/DL (ref 70–99)
GLUCOSE BLDC GLUCOMTR-MCNC: 218 MG/DL (ref 70–99)
GLUCOSE BLDC GLUCOMTR-MCNC: 229 MG/DL (ref 70–99)
GLUCOSE BLDC GLUCOMTR-MCNC: 281 MG/DL (ref 70–99)
GLUCOSE BLDC GLUCOMTR-MCNC: 284 MG/DL (ref 70–99)
MAGNESIUM SERPL-MCNC: 1.9 MG/DL (ref 1.6–2.6)
OSMOLALITY SERPL CALC.SUM OF ELEC: 304 MOSM/KG (ref 275–295)
PHOSPHATE SERPL-MCNC: 1.9 MG/DL (ref 2.5–4.9)
POTASSIUM SERPL-SCNC: 3.9 MMOL/L (ref 3.5–5.1)
SODIUM SERPL-SCNC: 141 MMOL/L (ref 136–145)

## 2022-11-15 PROCEDURE — 99233 SBSQ HOSP IP/OBS HIGH 50: CPT | Performed by: INTERNAL MEDICINE

## 2022-11-15 PROCEDURE — 74018 RADEX ABDOMEN 1 VIEW: CPT | Performed by: SURGERY

## 2022-11-15 PROCEDURE — 99232 SBSQ HOSP IP/OBS MODERATE 35: CPT | Performed by: SURGERY

## 2022-11-15 PROCEDURE — 99233 SBSQ HOSP IP/OBS HIGH 50: CPT | Performed by: HOSPITALIST

## 2022-11-15 RX ORDER — HYDROMORPHONE HYDROCHLORIDE 1 MG/ML
0.2 INJECTION, SOLUTION INTRAMUSCULAR; INTRAVENOUS; SUBCUTANEOUS EVERY 4 HOURS PRN
Status: DISCONTINUED | OUTPATIENT
Start: 2022-11-15 | End: 2022-11-22

## 2022-11-15 RX ORDER — HYDROMORPHONE HYDROCHLORIDE 1 MG/ML
0.8 INJECTION, SOLUTION INTRAMUSCULAR; INTRAVENOUS; SUBCUTANEOUS EVERY 4 HOURS PRN
Status: DISCONTINUED | OUTPATIENT
Start: 2022-11-15 | End: 2022-11-17

## 2022-11-15 RX ORDER — HYDROMORPHONE HYDROCHLORIDE 1 MG/ML
0.4 INJECTION, SOLUTION INTRAMUSCULAR; INTRAVENOUS; SUBCUTANEOUS EVERY 4 HOURS PRN
Status: DISCONTINUED | OUTPATIENT
Start: 2022-11-15 | End: 2022-11-17

## 2022-11-15 RX ORDER — ACETAMINOPHEN 10 MG/ML
1000 INJECTION, SOLUTION INTRAVENOUS EVERY 6 HOURS PRN
Status: DISCONTINUED | OUTPATIENT
Start: 2022-11-15 | End: 2022-11-22

## 2022-11-15 NOTE — PLAN OF CARE
No falls, bed low locked, call light within reach. PRN pain meds as ordered. Output from NGT as charted.   Problem: Diabetes/Glucose Control  Goal: Glucose maintained within prescribed range  Description: INTERVENTIONS:  - Monitor Blood Glucose as ordered  - Assess for signs and symptoms of hyperglycemia and hypoglycemia  - Administer ordered medications to maintain glucose within target range  - Assess barriers to adequate nutritional intake and initiate nutrition consult as needed  - Instruct patient on self management of diabetes  Outcome: Progressing     Problem: Patient/Family Goals  Goal: Patient/Family Long Term Goal  Description: Patient's Long Term Goal: to go home    Interventions:  - take medications as prescribed  - follow MD orders  - monitor labs  - See additional Care Plan goals for specific interventions  Outcome: Not Progressing  Goal: Patient/Family Short Term Goal  Description: Patient's Short Term Goal: to report pain    Interventions:   -PRN pain medications  - testing  - monitor labs  - See additional Care Plan goals for specific interventions  Outcome: Progressing     Problem: GASTROINTESTINAL - ADULT  Goal: Minimal or absence of nausea and vomiting  Description: INTERVENTIONS:  - Maintain adequate hydration with IV or PO as ordered and tolerated  - Nasogastric tube to low intermittent suction as ordered  - Evaluate effectiveness of ordered antiemetic medications  - Provide nonpharmacologic comfort measures as appropriate  - Advance diet as tolerated, if ordered  - Obtain nutritional consult as needed  - Evaluate fluid balance  Outcome: Progressing  Goal: Maintains or returns to baseline bowel function  Description: INTERVENTIONS:  - Assess bowel function  - Maintain adequate hydration with IV or PO as ordered and tolerated  - Evaluate effectiveness of GI medications  - Encourage mobilization and activity  - Obtain nutritional consult as needed  - Establish a toileting routine/schedule  - Consider collaborating with pharmacy to review patient's medication profile  Outcome: Not Progressing     Problem: METABOLIC/FLUID AND ELECTROLYTES - ADULT  Goal: Glucose maintained within prescribed range  Description: INTERVENTIONS:  - Monitor Blood Glucose as ordered  - Assess for signs and symptoms of hyperglycemia and hypoglycemia  - Administer ordered medications to maintain glucose within target range  - Assess barriers to adequate nutritional intake and initiate nutrition consult as needed  - Instruct patient on self management of diabetes  Outcome: Progressing

## 2022-11-15 NOTE — PROGRESS NOTES
1st attempt DM apt request & DM f/u questions (dc 11/08)    Dr Mohan [facility]  Internal Medicine  [address] [address]  [phone]  Pt ask that I call back a little later

## 2022-11-15 NOTE — PLAN OF CARE
XR small bowel follow through completed. NG clamped during this time. Pain medications given PRN. PICC placed for TPN. Problem: Patient Centered Care  Goal: Patient preferences are identified and integrated in the patient's plan of care  Description: Interventions:  - What would you like us to know as we care for you?  I was just discharged yesterday  - Provide timely, complete, and accurate information to patient/family  - Incorporate patient and family knowledge, values, beliefs, and cultural backgrounds into the planning and delivery of care  - Encourage patient/family to participate in care and decision-making at the level they choose  - Honor patient and family perspectives and choices  Outcome: Progressing     Problem: Diabetes/Glucose Control  Goal: Glucose maintained within prescribed range  Description: INTERVENTIONS:  - Monitor Blood Glucose as ordered  - Assess for signs and symptoms of hyperglycemia and hypoglycemia  - Administer ordered medications to maintain glucose within target range  - Assess barriers to adequate nutritional intake and initiate nutrition consult as needed  - Instruct patient on self management of diabetes  Outcome: Progressing     Problem: Patient/Family Goals  Goal: Patient/Family Long Term Goal  Description: Patient's Long Term Goal: to go home    Interventions:  - take medications as prescribed  - follow MD orders  - monitor labs  - See additional Care Plan goals for specific interventions  Outcome: Progressing  Goal: Patient/Family Short Term Goal  Description: Patient's Short Term Goal: to report pain    Interventions:   -PRN pain medications  - testing  - monitor labs  - See additional Care Plan goals for specific interventions  Outcome: Progressing     Problem: GASTROINTESTINAL - ADULT  Goal: Minimal or absence of nausea and vomiting  Description: INTERVENTIONS:  - Maintain adequate hydration with IV or PO as ordered and tolerated  - Nasogastric tube to low intermittent suction as ordered  - Evaluate effectiveness of ordered antiemetic medications  - Provide nonpharmacologic comfort measures as appropriate  - Advance diet as tolerated, if ordered  - Obtain nutritional consult as needed  - Evaluate fluid balance  Outcome: Progressing  Goal: Maintains or returns to baseline bowel function  Description: INTERVENTIONS:  - Assess bowel function  - Maintain adequate hydration with IV or PO as ordered and tolerated  - Evaluate effectiveness of GI medications  - Encourage mobilization and activity  - Obtain nutritional consult as needed  - Establish a toileting routine/schedule  - Consider collaborating with pharmacy to review patient's medication profile  Outcome: Progressing     Problem: METABOLIC/FLUID AND ELECTROLYTES - ADULT  Goal: Glucose maintained within prescribed range  Description: INTERVENTIONS:  - Monitor Blood Glucose as ordered  - Assess for signs and symptoms of hyperglycemia and hypoglycemia  - Administer ordered medications to maintain glucose within target range  - Assess barriers to adequate nutritional intake and initiate nutrition consult as needed  - Instruct patient on self management of diabetes  Outcome: Progressing

## 2022-11-15 NOTE — PLAN OF CARE
Monitoring vital signs & blood glucose per order, no acute changes. New IV tylenol prescribed for pain. Per MD, ok for pt to have ice chips & gatorade to help with throat pain. Repeat abd XR done today. Safety precautions in place, pt educated on call light use. Frequent rounding by nursing staff. Problem: Patient Centered Care  Goal: Patient preferences are identified and integrated in the patient's plan of care  Description: Interventions:  - What would you like us to know as we care for you?  I was just discharged yesterday  - Provide timely, complete, and accurate information to patient/family  - Incorporate patient and family knowledge, values, beliefs, and cultural backgrounds into the planning and delivery of care  - Encourage patient/family to participate in care and decision-making at the level they choose  - Honor patient and family perspectives and choices  Outcome: Progressing     Problem: Diabetes/Glucose Control  Goal: Glucose maintained within prescribed range  Description: INTERVENTIONS:  - Monitor Blood Glucose as ordered  - Assess for signs and symptoms of hyperglycemia and hypoglycemia  - Administer ordered medications to maintain glucose within target range  - Assess barriers to adequate nutritional intake and initiate nutrition consult as needed  - Instruct patient on self management of diabetes  Outcome: Progressing     Problem: Patient/Family Goals  Goal: Patient/Family Long Term Goal  Description: Patient's Long Term Goal: to go home    Interventions:  - take medications as prescribed  - follow MD orders  - monitor labs  - See additional Care Plan goals for specific interventions  Outcome: Progressing  Goal: Patient/Family Short Term Goal  Description: Patient's Short Term Goal: to report pain    Interventions:   -PRN pain medications  - testing  - monitor labs  - See additional Care Plan goals for specific interventions  Outcome: Progressing     Problem: GASTROINTESTINAL - ADULT  Goal: Minimal or absence of nausea and vomiting  Description: INTERVENTIONS:  - Maintain adequate hydration with IV or PO as ordered and tolerated  - Nasogastric tube to low intermittent suction as ordered  - Evaluate effectiveness of ordered antiemetic medications  - Provide nonpharmacologic comfort measures as appropriate  - Advance diet as tolerated, if ordered  - Obtain nutritional consult as needed  - Evaluate fluid balance  Outcome: Progressing  Goal: Maintains or returns to baseline bowel function  Description: INTERVENTIONS:  - Assess bowel function  - Maintain adequate hydration with IV or PO as ordered and tolerated  - Evaluate effectiveness of GI medications  - Encourage mobilization and activity  - Obtain nutritional consult as needed  - Establish a toileting routine/schedule  - Consider collaborating with pharmacy to review patient's medication profile  Outcome: Progressing     Problem: METABOLIC/FLUID AND ELECTROLYTES - ADULT  Goal: Glucose maintained within prescribed range  Description: INTERVENTIONS:  - Monitor Blood Glucose as ordered  - Assess for signs and symptoms of hyperglycemia and hypoglycemia  - Administer ordered medications to maintain glucose within target range  - Assess barriers to adequate nutritional intake and initiate nutrition consult as needed  - Instruct patient on self management of diabetes  Outcome: Progressing

## 2022-11-16 LAB
ANION GAP SERPL CALC-SCNC: 5 MMOL/L (ref 0–18)
BASOPHILS # BLD AUTO: 0.05 X10(3) UL (ref 0–0.2)
BASOPHILS NFR BLD AUTO: 0.5 %
BUN BLD-MCNC: 21 MG/DL (ref 7–18)
BUN/CREAT SERPL: 30.4 (ref 10–20)
CALCIUM BLD-MCNC: 8.8 MG/DL (ref 8.5–10.1)
CHLORIDE SERPL-SCNC: 103 MMOL/L (ref 98–112)
CO2 SERPL-SCNC: 32 MMOL/L (ref 21–32)
CREAT BLD-MCNC: 0.69 MG/DL
DEPRECATED RDW RBC AUTO: 42.7 FL (ref 35.1–46.3)
EOSINOPHIL # BLD AUTO: 0.27 X10(3) UL (ref 0–0.7)
EOSINOPHIL NFR BLD AUTO: 2.8 %
ERYTHROCYTE [DISTWIDTH] IN BLOOD BY AUTOMATED COUNT: 12.2 % (ref 11–15)
GFR SERPLBLD BASED ON 1.73 SQ M-ARVRAT: 119 ML/MIN/1.73M2 (ref 60–?)
GLUCOSE BLD-MCNC: 198 MG/DL (ref 70–99)
GLUCOSE BLDC GLUCOMTR-MCNC: 194 MG/DL (ref 70–99)
GLUCOSE BLDC GLUCOMTR-MCNC: 211 MG/DL (ref 70–99)
GLUCOSE BLDC GLUCOMTR-MCNC: 218 MG/DL (ref 70–99)
GLUCOSE BLDC GLUCOMTR-MCNC: 231 MG/DL (ref 70–99)
HCT VFR BLD AUTO: 40.5 %
HGB BLD-MCNC: 13.3 G/DL
IMM GRANULOCYTES # BLD AUTO: 0.04 X10(3) UL (ref 0–1)
IMM GRANULOCYTES NFR BLD: 0.4 %
LYMPHOCYTES # BLD AUTO: 1.07 X10(3) UL (ref 1–4)
LYMPHOCYTES NFR BLD AUTO: 11 %
MAGNESIUM SERPL-MCNC: 2 MG/DL (ref 1.6–2.6)
MCH RBC QN AUTO: 31.1 PG (ref 26–34)
MCHC RBC AUTO-ENTMCNC: 32.8 G/DL (ref 31–37)
MCV RBC AUTO: 94.6 FL
MONOCYTES # BLD AUTO: 1.38 X10(3) UL (ref 0.1–1)
MONOCYTES NFR BLD AUTO: 14.2 %
NEUTROPHILS # BLD AUTO: 6.92 X10 (3) UL (ref 1.5–7.7)
NEUTROPHILS # BLD AUTO: 6.92 X10(3) UL (ref 1.5–7.7)
NEUTROPHILS NFR BLD AUTO: 71.1 %
OSMOLALITY SERPL CALC.SUM OF ELEC: 299 MOSM/KG (ref 275–295)
PHOSPHATE SERPL-MCNC: 3.1 MG/DL (ref 2.5–4.9)
PLATELET # BLD AUTO: 375 10(3)UL (ref 150–450)
POTASSIUM SERPL-SCNC: 3.4 MMOL/L (ref 3.5–5.1)
RBC # BLD AUTO: 4.28 X10(6)UL
SODIUM SERPL-SCNC: 140 MMOL/L (ref 136–145)
WBC # BLD AUTO: 9.7 X10(3) UL (ref 4–11)

## 2022-11-16 PROCEDURE — 99232 SBSQ HOSP IP/OBS MODERATE 35: CPT | Performed by: INTERNAL MEDICINE

## 2022-11-16 PROCEDURE — 99233 SBSQ HOSP IP/OBS HIGH 50: CPT | Performed by: HOSPITALIST

## 2022-11-16 PROCEDURE — 99233 SBSQ HOSP IP/OBS HIGH 50: CPT | Performed by: INTERNAL MEDICINE

## 2022-11-16 PROCEDURE — 99232 SBSQ HOSP IP/OBS MODERATE 35: CPT | Performed by: SURGERY

## 2022-11-16 NOTE — PLAN OF CARE
Monitoring vital signs & blood glucose per order, no acute changes. Dr Marla Cronin removed NG tube and placed pt on full liquid diet, MD to reassess need for surgery tomorrow AM. Family at bedside much of day. Pt educated on call light use. Frequent rounding by nursing staff. Problem: Patient Centered Care  Goal: Patient preferences are identified and integrated in the patient's plan of care  Description: Interventions:  - What would you like us to know as we care for you?  I was just discharged yesterday  - Provide timely, complete, and accurate information to patient/family  - Incorporate patient and family knowledge, values, beliefs, and cultural backgrounds into the planning and delivery of care  - Encourage patient/family to participate in care and decision-making at the level they choose  - Honor patient and family perspectives and choices  Outcome: Progressing     Problem: Diabetes/Glucose Control  Goal: Glucose maintained within prescribed range  Description: INTERVENTIONS:  - Monitor Blood Glucose as ordered  - Assess for signs and symptoms of hyperglycemia and hypoglycemia  - Administer ordered medications to maintain glucose within target range  - Assess barriers to adequate nutritional intake and initiate nutrition consult as needed  - Instruct patient on self management of diabetes  Outcome: Progressing     Problem: Patient/Family Goals  Goal: Patient/Family Long Term Goal  Description: Patient's Long Term Goal: to go home    Interventions:  - take medications as prescribed  - follow MD orders  - monitor labs  - See additional Care Plan goals for specific interventions  Outcome: Progressing  Goal: Patient/Family Short Term Goal  Description: Patient's Short Term Goal: to report pain    Interventions:   -PRN pain medications  - testing  - monitor labs  - See additional Care Plan goals for specific interventions  Outcome: Progressing     Problem: GASTROINTESTINAL - ADULT  Goal: Minimal or absence of nausea and vomiting  Description: INTERVENTIONS:  - Maintain adequate hydration with IV or PO as ordered and tolerated  - Nasogastric tube to low intermittent suction as ordered  - Evaluate effectiveness of ordered antiemetic medications  - Provide nonpharmacologic comfort measures as appropriate  - Advance diet as tolerated, if ordered  - Obtain nutritional consult as needed  - Evaluate fluid balance  Outcome: Progressing  Goal: Maintains or returns to baseline bowel function  Description: INTERVENTIONS:  - Assess bowel function  - Maintain adequate hydration with IV or PO as ordered and tolerated  - Evaluate effectiveness of GI medications  - Encourage mobilization and activity  - Obtain nutritional consult as needed  - Establish a toileting routine/schedule  - Consider collaborating with pharmacy to review patient's medication profile  Outcome: Progressing     Problem: METABOLIC/FLUID AND ELECTROLYTES - ADULT  Goal: Glucose maintained within prescribed range  Description: INTERVENTIONS:  - Monitor Blood Glucose as ordered  - Assess for signs and symptoms of hyperglycemia and hypoglycemia  - Administer ordered medications to maintain glucose within target range  - Assess barriers to adequate nutritional intake and initiate nutrition consult as needed  - Instruct patient on self management of diabetes  Outcome: Progressing

## 2022-11-16 NOTE — PLAN OF CARE
No acute changes. Ng tube to low intermittent suction. Tpn infusing via picc line. Prn medication given for pain. Ng tube not in original position that was confirmed with xray. Pt refusing for the tube to be repositioned. Pt educated on the risks of the tube being in the incorrect placement. Pt confirms understanding. Problem: Patient Centered Care  Goal: Patient preferences are identified and integrated in the patient's plan of care  Description: Interventions:  - What would you like us to know as we care for you?  I was just discharged yesterday  - Provide timely, complete, and accurate information to patient/family  - Incorporate patient and family knowledge, values, beliefs, and cultural backgrounds into the planning and delivery of care  - Encourage patient/family to participate in care and decision-making at the level they choose  - Honor patient and family perspectives and choices  Outcome: Progressing     Problem: Diabetes/Glucose Control  Goal: Glucose maintained within prescribed range  Description: INTERVENTIONS:  - Monitor Blood Glucose as ordered  - Assess for signs and symptoms of hyperglycemia and hypoglycemia  - Administer ordered medications to maintain glucose within target range  - Assess barriers to adequate nutritional intake and initiate nutrition consult as needed  - Instruct patient on self management of diabetes  Outcome: Progressing     Problem: Patient/Family Goals  Goal: Patient/Family Long Term Goal  Description: Patient's Long Term Goal: to go home    Interventions:  - take medications as prescribed  - follow MD orders  - monitor labs  - See additional Care Plan goals for specific interventions  Outcome: Progressing  Goal: Patient/Family Short Term Goal  Description: Patient's Short Term Goal: to report pain    Interventions:   -PRN pain medications  - testing  - monitor labs  - See additional Care Plan goals for specific interventions  Outcome: Progressing     Problem: GASTROINTESTINAL - ADULT  Goal: Minimal or absence of nausea and vomiting  Description: INTERVENTIONS:  - Maintain adequate hydration with IV or PO as ordered and tolerated  - Nasogastric tube to low intermittent suction as ordered  - Evaluate effectiveness of ordered antiemetic medications  - Provide nonpharmacologic comfort measures as appropriate  - Advance diet as tolerated, if ordered  - Obtain nutritional consult as needed  - Evaluate fluid balance  Outcome: Progressing  Goal: Maintains or returns to baseline bowel function  Description: INTERVENTIONS:  - Assess bowel function  - Maintain adequate hydration with IV or PO as ordered and tolerated  - Evaluate effectiveness of GI medications  - Encourage mobilization and activity  - Obtain nutritional consult as needed  - Establish a toileting routine/schedule  - Consider collaborating with pharmacy to review patient's medication profile  Outcome: Progressing     Problem: METABOLIC/FLUID AND ELECTROLYTES - ADULT  Goal: Glucose maintained within prescribed range  Description: INTERVENTIONS:  - Monitor Blood Glucose as ordered  - Assess for signs and symptoms of hyperglycemia and hypoglycemia  - Administer ordered medications to maintain glucose within target range  - Assess barriers to adequate nutritional intake and initiate nutrition consult as needed  - Instruct patient on self management of diabetes  Outcome: Progressing

## 2022-11-16 NOTE — CM/SW NOTE
SW received a call from Bree Saldana, , informing that this pt insurance is out of network with hospital, and would need to be transferred to in [facility]. MANJINDER provided her transfer center [id number], and she will call transfer center. MD, RNs, and DC RN made aware via [address] secure chat of transfer. MANJINDER/NIRAV to remain available for support and/or discharge planning.      Norma Da Silva, MSW, LSW   x [phone]

## 2022-11-17 ENCOUNTER — APPOINTMENT (OUTPATIENT)
Dept: GENERAL RADIOLOGY | Facility: HOSPITAL | Age: 42
DRG: 329 | End: 2022-11-17
Attending: STUDENT IN AN ORGANIZED HEALTH CARE EDUCATION/TRAINING PROGRAM
Payer: COMMERCIAL

## 2022-11-17 ENCOUNTER — ANESTHESIA (OUTPATIENT)
Dept: SURGERY | Facility: HOSPITAL | Age: 42
End: 2022-11-17
Payer: COMMERCIAL

## 2022-11-17 ENCOUNTER — APPOINTMENT (OUTPATIENT)
Dept: GENERAL RADIOLOGY | Facility: HOSPITAL | Age: 42
End: 2022-11-17
Attending: STUDENT IN AN ORGANIZED HEALTH CARE EDUCATION/TRAINING PROGRAM
Payer: COMMERCIAL

## 2022-11-17 ENCOUNTER — ANESTHESIA EVENT (OUTPATIENT)
Dept: SURGERY | Facility: HOSPITAL | Age: 42
End: 2022-11-17
Payer: COMMERCIAL

## 2022-11-17 LAB
ANION GAP SERPL CALC-SCNC: 6 MMOL/L (ref 0–18)
BUN BLD-MCNC: 22 MG/DL (ref 7–18)
BUN/CREAT SERPL: 32.8 (ref 10–20)
CALCIUM BLD-MCNC: 8.8 MG/DL (ref 8.5–10.1)
CHLORIDE SERPL-SCNC: 104 MMOL/L (ref 98–112)
CO2 SERPL-SCNC: 29 MMOL/L (ref 21–32)
CREAT BLD-MCNC: 0.67 MG/DL
GFR SERPLBLD BASED ON 1.73 SQ M-ARVRAT: 120 ML/MIN/1.73M2 (ref 60–?)
GLUCOSE BLD-MCNC: 250 MG/DL (ref 70–99)
GLUCOSE BLDC GLUCOMTR-MCNC: 167 MG/DL (ref 70–99)
GLUCOSE BLDC GLUCOMTR-MCNC: 230 MG/DL (ref 70–99)
GLUCOSE BLDC GLUCOMTR-MCNC: 233 MG/DL (ref 70–99)
GLUCOSE BLDC GLUCOMTR-MCNC: 254 MG/DL (ref 70–99)
GLUCOSE BLDC GLUCOMTR-MCNC: 266 MG/DL (ref 70–99)
LACTATE SERPL-SCNC: 2.3 MMOL/L (ref 0.4–2)
MAGNESIUM SERPL-MCNC: 1.9 MG/DL (ref 1.6–2.6)
OSMOLALITY SERPL CALC.SUM OF ELEC: 300 MOSM/KG (ref 275–295)
PHOSPHATE SERPL-MCNC: 3.9 MG/DL (ref 2.5–4.9)
POTASSIUM SERPL-SCNC: 4.2 MMOL/L (ref 3.5–5.1)
POTASSIUM SERPL-SCNC: 4.2 MMOL/L (ref 3.5–5.1)
SARS-COV-2 RNA RESP QL NAA+PROBE: NOT DETECTED
SODIUM SERPL-SCNC: 139 MMOL/L (ref 136–145)

## 2022-11-17 PROCEDURE — 44202 LAP ENTERECTOMY: CPT | Performed by: SURGERY

## 2022-11-17 PROCEDURE — 71045 X-RAY EXAM CHEST 1 VIEW: CPT | Performed by: STUDENT IN AN ORGANIZED HEALTH CARE EDUCATION/TRAINING PROGRAM

## 2022-11-17 PROCEDURE — 0DB80ZZ EXCISION OF SMALL INTESTINE, OPEN APPROACH: ICD-10-PCS | Performed by: SURGERY

## 2022-11-17 PROCEDURE — 99223 1ST HOSP IP/OBS HIGH 75: CPT | Performed by: INTERNAL MEDICINE

## 2022-11-17 PROCEDURE — 99233 SBSQ HOSP IP/OBS HIGH 50: CPT | Performed by: HOSPITALIST

## 2022-11-17 PROCEDURE — 99232 SBSQ HOSP IP/OBS MODERATE 35: CPT | Performed by: INTERNAL MEDICINE

## 2022-11-17 PROCEDURE — 44203 LAP RESECT S/INTESTINE ADDL: CPT | Performed by: SURGERY

## 2022-11-17 RX ORDER — LIDOCAINE HYDROCHLORIDE 10 MG/ML
INJECTION, SOLUTION EPIDURAL; INFILTRATION; INTRACAUDAL; PERINEURAL AS NEEDED
Status: DISCONTINUED | OUTPATIENT
Start: 2022-11-17 | End: 2022-11-17 | Stop reason: SURG

## 2022-11-17 RX ORDER — OXYCODONE HYDROCHLORIDE 5 MG/1
5 TABLET ORAL EVERY 4 HOURS PRN
Status: DISCONTINUED | OUTPATIENT
Start: 2022-11-17 | End: 2022-11-29

## 2022-11-17 RX ORDER — SODIUM CHLORIDE, SODIUM LACTATE, POTASSIUM CHLORIDE, CALCIUM CHLORIDE 600; 310; 30; 20 MG/100ML; MG/100ML; MG/100ML; MG/100ML
INJECTION, SOLUTION INTRAVENOUS CONTINUOUS
Status: DISCONTINUED | OUTPATIENT
Start: 2022-11-17 | End: 2022-11-17 | Stop reason: HOSPADM

## 2022-11-17 RX ORDER — CEFAZOLIN SODIUM/WATER 2 G/20 ML
SYRINGE (ML) INTRAVENOUS AS NEEDED
Status: DISCONTINUED | OUTPATIENT
Start: 2022-11-17 | End: 2022-11-17

## 2022-11-17 RX ORDER — OXYCODONE HYDROCHLORIDE 5 MG/1
10 TABLET ORAL EVERY 4 HOURS PRN
Status: DISCONTINUED | OUTPATIENT
Start: 2022-11-17 | End: 2022-11-29

## 2022-11-17 RX ORDER — ONDANSETRON 2 MG/ML
4 INJECTION INTRAMUSCULAR; INTRAVENOUS EVERY 6 HOURS PRN
Status: DISCONTINUED | OUTPATIENT
Start: 2022-11-17 | End: 2022-11-29

## 2022-11-17 RX ORDER — ONDANSETRON 2 MG/ML
INJECTION INTRAMUSCULAR; INTRAVENOUS AS NEEDED
Status: DISCONTINUED | OUTPATIENT
Start: 2022-11-17 | End: 2022-11-17 | Stop reason: SURG

## 2022-11-17 RX ORDER — MORPHINE SULFATE 4 MG/ML
4 INJECTION, SOLUTION INTRAMUSCULAR; INTRAVENOUS EVERY 10 MIN PRN
Status: DISCONTINUED | OUTPATIENT
Start: 2022-11-17 | End: 2022-11-17 | Stop reason: HOSPADM

## 2022-11-17 RX ORDER — PROCHLORPERAZINE EDISYLATE 5 MG/ML
5 INJECTION INTRAMUSCULAR; INTRAVENOUS EVERY 8 HOURS PRN
Status: DISCONTINUED | OUTPATIENT
Start: 2022-11-17 | End: 2022-11-29

## 2022-11-17 RX ORDER — SODIUM CHLORIDE, SODIUM LACTATE, POTASSIUM CHLORIDE, CALCIUM CHLORIDE 600; 310; 30; 20 MG/100ML; MG/100ML; MG/100ML; MG/100ML
INJECTION, SOLUTION INTRAVENOUS CONTINUOUS
Status: DISCONTINUED | OUTPATIENT
Start: 2022-11-17 | End: 2022-11-29

## 2022-11-17 RX ORDER — HYDROMORPHONE HYDROCHLORIDE 1 MG/ML
0.4 INJECTION, SOLUTION INTRAMUSCULAR; INTRAVENOUS; SUBCUTANEOUS EVERY 5 MIN PRN
Status: DISCONTINUED | OUTPATIENT
Start: 2022-11-17 | End: 2022-11-17 | Stop reason: HOSPADM

## 2022-11-17 RX ORDER — MORPHINE SULFATE 4 MG/ML
2 INJECTION, SOLUTION INTRAMUSCULAR; INTRAVENOUS EVERY 10 MIN PRN
Status: DISCONTINUED | OUTPATIENT
Start: 2022-11-17 | End: 2022-11-17 | Stop reason: HOSPADM

## 2022-11-17 RX ORDER — HYDROCODONE BITARTRATE AND ACETAMINOPHEN 5; 325 MG/1; MG/1
1 TABLET ORAL ONCE AS NEEDED
Status: DISCONTINUED | OUTPATIENT
Start: 2022-11-17 | End: 2022-11-17 | Stop reason: HOSPADM

## 2022-11-17 RX ORDER — ACETAMINOPHEN 500 MG
1000 TABLET ORAL ONCE AS NEEDED
Status: DISCONTINUED | OUTPATIENT
Start: 2022-11-17 | End: 2022-11-17 | Stop reason: HOSPADM

## 2022-11-17 RX ORDER — HYDROMORPHONE HYDROCHLORIDE 1 MG/ML
0.8 INJECTION, SOLUTION INTRAMUSCULAR; INTRAVENOUS; SUBCUTANEOUS EVERY 2 HOUR PRN
Status: DISCONTINUED | OUTPATIENT
Start: 2022-11-17 | End: 2022-11-22

## 2022-11-17 RX ORDER — NALOXONE HYDROCHLORIDE 0.4 MG/ML
80 INJECTION, SOLUTION INTRAMUSCULAR; INTRAVENOUS; SUBCUTANEOUS AS NEEDED
Status: DISCONTINUED | OUTPATIENT
Start: 2022-11-17 | End: 2022-11-17 | Stop reason: HOSPADM

## 2022-11-17 RX ORDER — SODIUM CHLORIDE, SODIUM LACTATE, POTASSIUM CHLORIDE, CALCIUM CHLORIDE 600; 310; 30; 20 MG/100ML; MG/100ML; MG/100ML; MG/100ML
INJECTION, SOLUTION INTRAVENOUS CONTINUOUS PRN
Status: DISCONTINUED | OUTPATIENT
Start: 2022-11-17 | End: 2022-11-17 | Stop reason: SURG

## 2022-11-17 RX ORDER — MORPHINE SULFATE 10 MG/ML
6 INJECTION, SOLUTION INTRAMUSCULAR; INTRAVENOUS EVERY 10 MIN PRN
Status: DISCONTINUED | OUTPATIENT
Start: 2022-11-17 | End: 2022-11-17 | Stop reason: HOSPADM

## 2022-11-17 RX ORDER — KETOROLAC TROMETHAMINE 30 MG/ML
30 INJECTION, SOLUTION INTRAMUSCULAR; INTRAVENOUS EVERY 6 HOURS
Status: COMPLETED | OUTPATIENT
Start: 2022-11-17 | End: 2022-11-19

## 2022-11-17 RX ORDER — ACETAMINOPHEN 10 MG/ML
INJECTION, SOLUTION INTRAVENOUS AS NEEDED
Status: DISCONTINUED | OUTPATIENT
Start: 2022-11-17 | End: 2022-11-17 | Stop reason: SURG

## 2022-11-17 RX ORDER — BUPIVACAINE HYDROCHLORIDE 5 MG/ML
INJECTION, SOLUTION EPIDURAL; INTRACAUDAL AS NEEDED
Status: DISCONTINUED | OUTPATIENT
Start: 2022-11-17 | End: 2022-11-17 | Stop reason: HOSPADM

## 2022-11-17 RX ORDER — ONDANSETRON 2 MG/ML
4 INJECTION INTRAMUSCULAR; INTRAVENOUS EVERY 6 HOURS PRN
Status: DISCONTINUED | OUTPATIENT
Start: 2022-11-17 | End: 2022-11-17 | Stop reason: HOSPADM

## 2022-11-17 RX ORDER — DEXAMETHASONE SODIUM PHOSPHATE 4 MG/ML
VIAL (ML) INJECTION AS NEEDED
Status: DISCONTINUED | OUTPATIENT
Start: 2022-11-17 | End: 2022-11-17 | Stop reason: SURG

## 2022-11-17 RX ORDER — CEFAZOLIN SODIUM/WATER 2 G/20 ML
SYRINGE (ML) INTRAVENOUS AS NEEDED
Status: DISCONTINUED | OUTPATIENT
Start: 2022-11-17 | End: 2022-11-17 | Stop reason: SURG

## 2022-11-17 RX ORDER — HYDROMORPHONE HYDROCHLORIDE 1 MG/ML
0.6 INJECTION, SOLUTION INTRAMUSCULAR; INTRAVENOUS; SUBCUTANEOUS EVERY 5 MIN PRN
Status: DISCONTINUED | OUTPATIENT
Start: 2022-11-17 | End: 2022-11-17 | Stop reason: HOSPADM

## 2022-11-17 RX ORDER — HYDROMORPHONE HYDROCHLORIDE 1 MG/ML
0.2 INJECTION, SOLUTION INTRAMUSCULAR; INTRAVENOUS; SUBCUTANEOUS EVERY 5 MIN PRN
Status: DISCONTINUED | OUTPATIENT
Start: 2022-11-17 | End: 2022-11-17 | Stop reason: HOSPADM

## 2022-11-17 RX ORDER — MIDAZOLAM HYDROCHLORIDE 1 MG/ML
INJECTION INTRAMUSCULAR; INTRAVENOUS AS NEEDED
Status: DISCONTINUED | OUTPATIENT
Start: 2022-11-17 | End: 2022-11-17 | Stop reason: SURG

## 2022-11-17 RX ORDER — SODIUM CHLORIDE 9 MG/ML
INJECTION INTRAVENOUS
Status: COMPLETED
Start: 2022-11-17 | End: 2022-11-17

## 2022-11-17 RX ORDER — HEPARIN SODIUM 5000 [USP'U]/ML
5000 INJECTION, SOLUTION INTRAVENOUS; SUBCUTANEOUS EVERY 12 HOURS SCHEDULED
Status: DISCONTINUED | OUTPATIENT
Start: 2022-11-18 | End: 2022-11-18

## 2022-11-17 RX ORDER — PROCHLORPERAZINE EDISYLATE 5 MG/ML
5 INJECTION INTRAMUSCULAR; INTRAVENOUS EVERY 8 HOURS PRN
Status: DISCONTINUED | OUTPATIENT
Start: 2022-11-17 | End: 2022-11-17 | Stop reason: HOSPADM

## 2022-11-17 RX ORDER — HYDROMORPHONE HYDROCHLORIDE 1 MG/ML
0.4 INJECTION, SOLUTION INTRAMUSCULAR; INTRAVENOUS; SUBCUTANEOUS EVERY 2 HOUR PRN
Status: DISCONTINUED | OUTPATIENT
Start: 2022-11-17 | End: 2022-11-22

## 2022-11-17 RX ORDER — ROCURONIUM BROMIDE 10 MG/ML
INJECTION, SOLUTION INTRAVENOUS AS NEEDED
Status: DISCONTINUED | OUTPATIENT
Start: 2022-11-17 | End: 2022-11-17 | Stop reason: SURG

## 2022-11-17 RX ORDER — INSULIN ASPART 100 [IU]/ML
INJECTION, SOLUTION INTRAVENOUS; SUBCUTANEOUS ONCE
Status: COMPLETED | OUTPATIENT
Start: 2022-11-17 | End: 2022-11-17

## 2022-11-17 RX ORDER — HYDROCODONE BITARTRATE AND ACETAMINOPHEN 5; 325 MG/1; MG/1
2 TABLET ORAL ONCE AS NEEDED
Status: DISCONTINUED | OUTPATIENT
Start: 2022-11-17 | End: 2022-11-17 | Stop reason: HOSPADM

## 2022-11-17 RX ADMIN — ROCURONIUM BROMIDE 25 MG: 10 INJECTION, SOLUTION INTRAVENOUS at 10:58:00

## 2022-11-17 RX ADMIN — MIDAZOLAM HYDROCHLORIDE 1 MG: 1 INJECTION INTRAMUSCULAR; INTRAVENOUS at 10:40:00

## 2022-11-17 RX ADMIN — SODIUM CHLORIDE, SODIUM LACTATE, POTASSIUM CHLORIDE, CALCIUM CHLORIDE: 600; 310; 30; 20 INJECTION, SOLUTION INTRAVENOUS at 13:22:00

## 2022-11-17 RX ADMIN — ONDANSETRON 4 MG: 2 INJECTION INTRAMUSCULAR; INTRAVENOUS at 12:31:00

## 2022-11-17 RX ADMIN — SODIUM CHLORIDE, SODIUM LACTATE, POTASSIUM CHLORIDE, CALCIUM CHLORIDE: 600; 310; 30; 20 INJECTION, SOLUTION INTRAVENOUS at 10:40:00

## 2022-11-17 RX ADMIN — DEXAMETHASONE SODIUM PHOSPHATE 4 MG: 4 MG/ML VIAL (ML) INJECTION at 10:58:00

## 2022-11-17 RX ADMIN — ROCURONIUM BROMIDE 5 MG: 10 INJECTION, SOLUTION INTRAVENOUS at 11:12:00

## 2022-11-17 RX ADMIN — ROCURONIUM BROMIDE 10 MG: 10 INJECTION, SOLUTION INTRAVENOUS at 11:37:00

## 2022-11-17 RX ADMIN — ROCURONIUM BROMIDE 5 MG: 10 INJECTION, SOLUTION INTRAVENOUS at 12:08:00

## 2022-11-17 RX ADMIN — CEFAZOLIN SODIUM/WATER 2 G: 2 G/20 ML SYRINGE (ML) INTRAVENOUS at 10:50:00

## 2022-11-17 RX ADMIN — ROCURONIUM BROMIDE 5 MG: 10 INJECTION, SOLUTION INTRAVENOUS at 11:28:00

## 2022-11-17 RX ADMIN — LIDOCAINE HYDROCHLORIDE 50 MG: 10 INJECTION, SOLUTION EPIDURAL; INFILTRATION; INTRACAUDAL; PERINEURAL at 10:48:00

## 2022-11-17 RX ADMIN — LIDOCAINE HYDROCHLORIDE 50 MG: 10 INJECTION, SOLUTION EPIDURAL; INFILTRATION; INTRACAUDAL; PERINEURAL at 11:01:00

## 2022-11-17 RX ADMIN — ACETAMINOPHEN 1000 MG: 10 INJECTION, SOLUTION INTRAVENOUS at 10:58:00

## 2022-11-17 NOTE — PLAN OF CARE
Pt reports abdominal pain throughout the night with bouts of nausea. Prn medications given with some relief. Pt unable to tolerate diet without pain. Wife is at bedside. Tpn is infusing. Pt is npo at midnight for labroscopy tomorrow. Call light is within reach. Problem: Patient Centered Care  Goal: Patient preferences are identified and integrated in the patient's plan of care  Description: Interventions:  - What would you like us to know as we care for you?  I was just discharged yesterday  - Provide timely, complete, and accurate information to patient/family  - Incorporate patient and family knowledge, values, beliefs, and cultural backgrounds into the planning and delivery of care  - Encourage patient/family to participate in care and decision-making at the level they choose  - Honor patient and family perspectives and choices  Outcome: Progressing     Problem: Diabetes/Glucose Control  Goal: Glucose maintained within prescribed range  Description: INTERVENTIONS:  - Monitor Blood Glucose as ordered  - Assess for signs and symptoms of hyperglycemia and hypoglycemia  - Administer ordered medications to maintain glucose within target range  - Assess barriers to adequate nutritional intake and initiate nutrition consult as needed  - Instruct patient on self management of diabetes  Outcome: Progressing     Problem: Patient/Family Goals  Goal: Patient/Family Long Term Goal  Description: Patient's Long Term Goal: to go home    Interventions:  - take medications as prescribed  - follow MD orders  - monitor labs  - See additional Care Plan goals for specific interventions  Outcome: Progressing  Goal: Patient/Family Short Term Goal  Description: Patient's Short Term Goal: to report pain    Interventions:   -PRN pain medications  - testing  - monitor labs  - See additional Care Plan goals for specific interventions  Outcome: Progressing     Problem: GASTROINTESTINAL - ADULT  Goal: Minimal or absence of nausea and vomiting  Description: INTERVENTIONS:  - Maintain adequate hydration with IV or PO as ordered and tolerated  - Nasogastric tube to low intermittent suction as ordered  - Evaluate effectiveness of ordered antiemetic medications  - Provide nonpharmacologic comfort measures as appropriate  - Advance diet as tolerated, if ordered  - Obtain nutritional consult as needed  - Evaluate fluid balance  Outcome: Not Progressing  Goal: Maintains or returns to baseline bowel function  Description: INTERVENTIONS:  - Assess bowel function  - Maintain adequate hydration with IV or PO as ordered and tolerated  - Evaluate effectiveness of GI medications  - Encourage mobilization and activity  - Obtain nutritional consult as needed  - Establish a toileting routine/schedule  - Consider collaborating with pharmacy to review patient's medication profile  Outcome: Not Progressing     Problem: METABOLIC/FLUID AND ELECTROLYTES - ADULT  Goal: Glucose maintained within prescribed range  Description: INTERVENTIONS:  - Monitor Blood Glucose as ordered  - Assess for signs and symptoms of hyperglycemia and hypoglycemia  - Administer ordered medications to maintain glucose within target range  - Assess barriers to adequate nutritional intake and initiate nutrition consult as needed  - Instruct patient on self management of diabetes  Outcome: Progressing

## 2022-11-17 NOTE — PLAN OF CARE
Problem: Patient Centered Care  Goal: Patient preferences are identified and integrated in the patient's plan of care  Description: Interventions:  - What would you like us to know as we care for you?  I was just discharged yesterday  - Provide timely, complete, and accurate information to patient/family  - Incorporate patient and family knowledge, values, beliefs, and cultural backgrounds into the planning and delivery of care  - Encourage patient/family to participate in care and decision-making at the level they choose  - Honor patient and family perspectives and choices  Outcome: Progressing     Problem: Diabetes/Glucose Control  Goal: Glucose maintained within prescribed range  Description: INTERVENTIONS:  - Monitor Blood Glucose as ordered  - Assess for signs and symptoms of hyperglycemia and hypoglycemia  - Administer ordered medications to maintain glucose within target range  - Assess barriers to adequate nutritional intake and initiate nutrition consult as needed  - Instruct patient on self management of diabetes  Outcome: Progressing     Problem: Patient/Family Goals  Goal: Patient/Family Long Term Goal  Description: Patient's Long Term Goal: to go home    Interventions:  - take medications as prescribed  - follow MD orders  - monitor labs  - See additional Care Plan goals for specific interventions  Outcome: Progressing  Goal: Patient/Family Short Term Goal  Description: Patient's Short Term Goal: to report pain    Interventions:   -PRN pain medications  - testing  - monitor labs  - See additional Care Plan goals for specific interventions  Outcome: Progressing     Problem: GASTROINTESTINAL - ADULT  Goal: Minimal or absence of nausea and vomiting  Description: INTERVENTIONS:  - Maintain adequate hydration with IV or PO as ordered and tolerated  - Nasogastric tube to low intermittent suction as ordered  - Evaluate effectiveness of ordered antiemetic medications  - Provide nonpharmacologic comfort measures as appropriate  - Advance diet as tolerated, if ordered  - Obtain nutritional consult as needed  - Evaluate fluid balance  Outcome: Progressing  Goal: Maintains or returns to baseline bowel function  Description: INTERVENTIONS:  - Assess bowel function  - Maintain adequate hydration with IV or PO as ordered and tolerated  - Evaluate effectiveness of GI medications  - Encourage mobilization and activity  - Obtain nutritional consult as needed  - Establish a toileting routine/schedule  - Consider collaborating with pharmacy to review patient's medication profile  Outcome: Progressing     Problem: METABOLIC/FLUID AND ELECTROLYTES - ADULT  Goal: Glucose maintained within prescribed range  Description: INTERVENTIONS:  - Monitor Blood Glucose as ordered  - Assess for signs and symptoms of hyperglycemia and hypoglycemia  - Administer ordered medications to maintain glucose within target range  - Assess barriers to adequate nutritional intake and initiate nutrition consult as needed  - Instruct patient on self management of diabetes  Outcome: Progressing   Pt c/o abdominal pain 10/10 this morning. Dilaudid given. Pain decreased to 1/10. MD recommended NG tube be replaced. Pt refused NG tube. Discussed plan for OR today with pt and wife at bedside. Pt off the unit to OR at 1003. Pt is not returning back to previous room. Pt's wife aware. Belongings packed up and sent with wife. Pt transferring to CCU post-op. Report given to [facility].

## 2022-11-17 NOTE — ADDENDUM NOTE
Addendum  created 11/17/22 1409 by Edward Saucedo CRNA    Clinical Note Signed, Intraprocedure Event edited

## 2022-11-17 NOTE — ANESTHESIA PROCEDURE NOTES
Airway  Date/Time: 11/17/2022 10:51 AM  Urgency: Elective      General Information and Staff    Patient location during procedure: OR  Anesthesiologist: Oscar Collier MD  Resident/CRNA: Sina Carrasco CRNA  Performed: CRNA     Indications and Patient Condition  Indications for airway management: anesthesia  Sedation level: deep  Preoxygenated: yes  Patient position: sniffing  Mask difficulty assessment: 1 - vent by mask    Final Airway Details  Final airway type: endotracheal airway      Successful airway: ETT  Cuffed: yes   Successful intubation technique: direct laryngoscopy  Endotracheal tube insertion site: oral  Blade: Naveen  Blade size: #4  ETT size (mm): 8.0    Cormack-Lehane Classification: grade I - full view of glottis  Placement verified by: chest auscultation and capnometry   Measured from: lips  ETT to lips (cm): 22  Number of attempts at approach: 1    Additional Comments  Dental exam unchanged from pre op

## 2022-11-17 NOTE — OPERATIVE REPORT
Operative Report    Patient Name:  Burton Rodgers  MR:  [id number]  :  10/19/1980  DOS:  23    Preop Dx:  SBO  Postop Dx:  SBO, Small bowel stricture, Intra-abdominal infection  Procedure:  Laparoscopic assisted small bowel resection x 2  Surgeon:  Thi Mir MD  Surgical Assistant.: Feliz Leventhal, CSA, Erich Jamison MD  EBL: 30 ml  Complication:  None    INDICATION:  Pt is a 40year old male who with Abdominal pain who is scheduled for a Diagnostic laparoscopy, small bowel resection . CONSENT:  An informed consent discussion was held with the patient regarding the nature of sbo, the treatment options and the details of the procedure. The risks including but not limited to bleeding, wound infection, intra-abdominal infection, injury to the colon, small intestine, incomplete resection, anastomotic leak/bleeding/stricture, internal and incisional hernia were discussed. The patient expressed understanding and want to proceed with the planned procedure. TECHNIQUE:  The patient was taken to the OR and placed in supine position. General anesthesia was established and the abdomen was prepped in standard fashion. Pneumoperitoneum was obtained using Veress needle technique through a supra-umbilical incision. A 5-mm trocar was inserted under direct visualization and no injury occurred. Examination of the abdomen showed dilated loops of small bowel. Two 5-mm trocars were placed in the LUQ and LLQ. The patient was placed in Trendelenburg position. I found decompressed distal ileum loops of small bowel and traced it backwards. There were fibrinopurulent material in several spots on the dilated loops of small bowel indicative of an intra abdominal infection. The etiology of the infection remains unclear. I was able to trace the small bowel back to an area of stricture. The decision was made to resect this stricture.   I had to place an additional 5-mm trocar in the RUQ to facilitate running of the small bowel. The supra umbilical trocar was removed and the fascia was opened to allow placement of the Aleja port. The small bowel stricture was identified through the Aleja port and this segment was resected using 60 x 2.5 mm linear stapler. The proximal dilated segment of the small bowel was tethered to the RUQ. I re-established pneumoperitoneum and freed this area from the adhesions to the RUQ. Upon further examination, there was a hole in small bowel where the adhesion was located. I made the decision to extend the area of resection to encompass the small bowel hole. The proximal segment was divided using the 60 x 2.5 mm linear stapler. The mesentery was divided using the enseal and bleeding areas were controlled using suture ligation. The specimens were sent to pathology. Bowel continuity was restored by performing a side to side small bowel anastomosis using a 60 x 2.5 mm linear stapler. The common enterotomy was closed using 4-0 PDS. A reinforcement suture was placed at the end of the staple line. The anastomosis appeared widely patent, hemostatic and not under tension. The mesenteric defect was closed using 3-0 silk. The abdomen was irrigated with saline and found to be hemostatic. The cecum and terminal ileum were adherent to the RLQ lateral wall. I was not able to easily identify the appendix. The ports were withdrawn under direct visualization and no port site bleeding was seen. The hali umbilical fascia was closed using 0 PDS. The skin incisions were closed using 4-0 vicryl. Sterile dressings were applied. All instrument and sponge counts were correct. I was present during the critical portions of the procedure.         Caren Duckworth MD

## 2022-11-18 LAB
ALBUMIN SERPL-MCNC: 1.8 G/DL (ref 3.4–5)
ALBUMIN/GLOB SERPL: 0.5 {RATIO} (ref 1–2)
ALP LIVER SERPL-CCNC: 45 U/L
ALT SERPL-CCNC: 12 U/L
ANION GAP SERPL CALC-SCNC: 3 MMOL/L (ref 0–18)
AST SERPL-CCNC: 13 U/L (ref 15–37)
BASOPHILS # BLD: 0.12 X10(3) UL (ref 0–0.2)
BASOPHILS NFR BLD: 1 %
BILIRUB SERPL-MCNC: 0.4 MG/DL (ref 0.1–2)
BUN BLD-MCNC: 24 MG/DL (ref 7–18)
BUN/CREAT SERPL: 30.4 (ref 10–20)
CALCIUM BLD-MCNC: 8.4 MG/DL (ref 8.5–10.1)
CHLORIDE SERPL-SCNC: 109 MMOL/L (ref 98–112)
CO2 SERPL-SCNC: 30 MMOL/L (ref 21–32)
CREAT BLD-MCNC: 0.79 MG/DL
DEPRECATED RDW RBC AUTO: 42.1 FL (ref 35.1–46.3)
EOSINOPHIL # BLD: 0 X10(3) UL (ref 0–0.7)
EOSINOPHIL NFR BLD: 0 %
ERYTHROCYTE [DISTWIDTH] IN BLOOD BY AUTOMATED COUNT: 12.3 % (ref 11–15)
GFR SERPLBLD BASED ON 1.73 SQ M-ARVRAT: 114 ML/MIN/1.73M2 (ref 60–?)
GLOBULIN PLAS-MCNC: 3.3 G/DL (ref 2.8–4.4)
GLUCOSE BLD-MCNC: 160 MG/DL (ref 70–99)
GLUCOSE BLDC GLUCOMTR-MCNC: 133 MG/DL (ref 70–99)
GLUCOSE BLDC GLUCOMTR-MCNC: 144 MG/DL (ref 70–99)
GLUCOSE BLDC GLUCOMTR-MCNC: 192 MG/DL (ref 70–99)
GLUCOSE BLDC GLUCOMTR-MCNC: 233 MG/DL (ref 70–99)
HCT VFR BLD AUTO: 36 %
HGB BLD-MCNC: 12.3 G/DL
LACTATE SERPL-SCNC: 1.7 MMOL/L (ref 0.4–2)
LYMPHOCYTES NFR BLD: 0.46 X10(3) UL (ref 1–4)
LYMPHOCYTES NFR BLD: 4 %
MAGNESIUM SERPL-MCNC: 1.6 MG/DL (ref 1.6–2.6)
MCH RBC QN AUTO: 31.9 PG (ref 26–34)
MCHC RBC AUTO-ENTMCNC: 34.2 G/DL (ref 31–37)
MCV RBC AUTO: 93.5 FL
METAMYELOCYTES # BLD: 0.93 X10(3) UL
METAMYELOCYTES NFR BLD: 8 %
MONOCYTES # BLD: 0.7 X10(3) UL (ref 0.1–1)
MONOCYTES NFR BLD: 6 %
NEUTROPHILS # BLD AUTO: 9.18 X10 (3) UL (ref 1.5–7.7)
NEUTROPHILS NFR BLD: 70 %
NEUTS BAND NFR BLD: 11 %
NEUTS HYPERSEG # BLD: 9.4 X10(3) UL (ref 1.5–7.7)
OSMOLALITY SERPL CALC.SUM OF ELEC: 301 MOSM/KG (ref 275–295)
PHOSPHATE SERPL-MCNC: 3.6 MG/DL (ref 2.5–4.9)
PLATELET # BLD AUTO: 219 10(3)UL (ref 150–450)
PLATELET MORPHOLOGY: NORMAL
POTASSIUM SERPL-SCNC: 4.4 MMOL/L (ref 3.5–5.1)
PROT SERPL-MCNC: 5.1 G/DL (ref 6.4–8.2)
RBC # BLD AUTO: 3.85 X10(6)UL
SODIUM SERPL-SCNC: 142 MMOL/L (ref 136–145)
TOTAL CELLS COUNTED BLD: 100
TOXIC GRANULES BLD QL SMEAR: PRESENT
WBC # BLD AUTO: 11.6 X10(3) UL (ref 4–11)

## 2022-11-18 PROCEDURE — 99233 SBSQ HOSP IP/OBS HIGH 50: CPT | Performed by: INTERNAL MEDICINE

## 2022-11-18 PROCEDURE — 99232 SBSQ HOSP IP/OBS MODERATE 35: CPT | Performed by: INTERNAL MEDICINE

## 2022-11-18 PROCEDURE — 99233 SBSQ HOSP IP/OBS HIGH 50: CPT | Performed by: HOSPITALIST

## 2022-11-18 RX ORDER — HEPARIN SODIUM 5000 [USP'U]/ML
5000 INJECTION, SOLUTION INTRAVENOUS; SUBCUTANEOUS EVERY 12 HOURS SCHEDULED
Status: DISCONTINUED | OUTPATIENT
Start: 2022-11-18 | End: 2022-11-29

## 2022-11-18 RX ORDER — FLUCONAZOLE 2 MG/ML
400 INJECTION, SOLUTION INTRAVENOUS EVERY 24 HOURS
Status: DISCONTINUED | OUTPATIENT
Start: 2022-11-18 | End: 2022-11-28

## 2022-11-18 RX ORDER — MAGNESIUM SULFATE HEPTAHYDRATE 40 MG/ML
2 INJECTION, SOLUTION INTRAVENOUS ONCE
Status: COMPLETED | OUTPATIENT
Start: 2022-11-18 | End: 2022-11-18

## 2022-11-18 NOTE — PLAN OF CARE
Problem: Diabetes/Glucose Control  Goal: Glucose maintained within prescribed range  Description: INTERVENTIONS:  - Monitor Blood Glucose as ordered  - Assess for signs and symptoms of hyperglycemia and hypoglycemia  - Administer ordered medications to maintain glucose within target range  - Assess barriers to adequate nutritional intake and initiate nutrition consult as needed  - Instruct patient on self management of diabetes  Outcome: Progressing     Problem: GASTROINTESTINAL - ADULT  Goal: Minimal or absence of nausea and vomiting  Description: INTERVENTIONS:  - Maintain adequate hydration with IV or PO as ordered and tolerated  - Nasogastric tube to low intermittent suction as ordered  - Evaluate effectiveness of ordered antiemetic medications  - Provide nonpharmacologic comfort measures as appropriate  - Advance diet as tolerated, if ordered  - Obtain nutritional consult as needed  - Evaluate fluid balance  Outcome: Progressing  Goal: Maintains or returns to baseline bowel function  Description: INTERVENTIONS:  - Assess bowel function  - Maintain adequate hydration with IV or PO as ordered and tolerated  - Evaluate effectiveness of GI medications  - Encourage mobilization and activity  - Obtain nutritional consult as needed  - Establish a toileting routine/schedule  - Consider collaborating with pharmacy to review patient's medication profile  Outcome: Progressing     Problem: METABOLIC/FLUID AND ELECTROLYTES - ADULT  Goal: Glucose maintained within prescribed range  Description: INTERVENTIONS:  - Monitor Blood Glucose as ordered  - Assess for signs and symptoms of hyperglycemia and hypoglycemia  - Administer ordered medications to maintain glucose within target range  - Assess barriers to adequate nutritional intake and initiate nutrition consult as needed  - Instruct patient on self management of diabetes  Outcome: Progressing     Problem: PAIN - ADULT  Goal: Verbalizes/displays adequate comfort level or patient's stated pain goal  Description: INTERVENTIONS:  - Encourage pt to monitor pain and request assistance  - Assess pain using appropriate pain scale  - Administer analgesics based on type and severity of pain and evaluate response  - Implement non-pharmacological measures as appropriate and evaluate response  - Consider cultural and social influences on pain and pain management  - Manage/alleviate anxiety  - Utilize distraction and/or relaxation techniques  - Monitor for opioid side effects  - Notify MD/LIP if interventions unsuccessful or patient reports new pain  - Anticipate increased pain with activity and pre-medicate as appropriate  Outcome: Progressing

## 2022-11-18 NOTE — PLAN OF CARE
Mr Celesitno Aragon  VSS have been stable. He had a low grade temp of 100.0 at 0000 but it was 97.8 at 0400. His Lactic acid was 2.3  and  the follow up was 1. 7. He has a lot of pain but it is relieved w/the pain meds that are ordered. Problem: Patient Centered Care  Goal: Patient preferences are identified and integrated in the patient's plan of care  Description: Interventions:  - What would you like us to know as we care for you?  I was just discharged yesterday  - Provide timely, complete, and accurate information to patient/family  - Incorporate patient and family knowledge, values, beliefs, and cultural backgrounds into the planning and delivery of care  - Encourage patient/family to participate in care and decision-making at the level they choose  - Honor patient and family perspectives and choices  Outcome: Progressing     Problem: Diabetes/Glucose Control  Goal: Glucose maintained within prescribed range  Description: INTERVENTIONS:  - Monitor Blood Glucose as ordered  - Assess for signs and symptoms of hyperglycemia and hypoglycemia  - Administer ordered medications to maintain glucose within target range  - Assess barriers to adequate nutritional intake and initiate nutrition consult as needed  - Instruct patient on self management of diabetes  Outcome: Progressing     Problem: Patient/Family Goals  Goal: Patient/Family Short Term Goal  Description: Patient's Short Term Goal: to report pain    Interventions:   -PRN pain medications  - testing  - monitor labs  - See additional Care Plan goals for specific interventions  Outcome: Progressing     Problem: GASTROINTESTINAL - ADULT  Goal: Minimal or absence of nausea and vomiting  Description: INTERVENTIONS:  - Maintain adequate hydration with IV or PO as ordered and tolerated  - Nasogastric tube to low intermittent suction as ordered  - Evaluate effectiveness of ordered antiemetic medications  - Provide nonpharmacologic comfort measures as appropriate  - Advance diet as tolerated, if ordered  - Obtain nutritional consult as needed  - Evaluate fluid balance  Outcome: Progressing  Goal: Maintains or returns to baseline bowel function  Description: INTERVENTIONS:  - Assess bowel function  - Maintain adequate hydration with IV or PO as ordered and tolerated  - Evaluate effectiveness of GI medications  - Encourage mobilization and activity  - Obtain nutritional consult as needed  - Establish a toileting routine/schedule  - Consider collaborating with pharmacy to review patient's medication profile  Outcome: Progressing     Problem: METABOLIC/FLUID AND ELECTROLYTES - ADULT  Goal: Glucose maintained within prescribed range  Description: INTERVENTIONS:  - Monitor Blood Glucose as ordered  - Assess for signs and symptoms of hyperglycemia and hypoglycemia  - Administer ordered medications to maintain glucose within target range  - Assess barriers to adequate nutritional intake and initiate nutrition consult as needed  - Instruct patient on self management of diabetes  Outcome: Progressing

## 2022-11-18 NOTE — PLAN OF CARE
Patient received from [facility]. Patient complaining of abdominal pain. Medications provided. Patient weaned to 3L NC with saturations >94%. TPN resumed. NG to LIS. No complaints of nausea. Lactic acid sent. Incisions intact. SCDs on. Sister and wife at the bedside. Problem: Patient Centered Care  Goal: Patient preferences are identified and integrated in the patient's plan of care  Description: Interventions:  - What would you like us to know as we care for you?  I was just discharged yesterday  - Provide timely, complete, and accurate information to patient/family  - Incorporate patient and family knowledge, values, beliefs, and cultural backgrounds into the planning and delivery of care  - Encourage patient/family to participate in care and decision-making at the level they choose  - Honor patient and family perspectives and choices  Outcome: Progressing     Problem: Diabetes/Glucose Control  Goal: Glucose maintained within prescribed range  Description: INTERVENTIONS:  - Monitor Blood Glucose as ordered  - Assess for signs and symptoms of hyperglycemia and hypoglycemia  - Administer ordered medications to maintain glucose within target range  - Assess barriers to adequate nutritional intake and initiate nutrition consult as needed  - Instruct patient on self management of diabetes  Outcome: Progressing     Problem: Patient/Family Goals  Goal: Patient/Family Long Term Goal  Description: Patient's Long Term Goal: to go home    Interventions:  - take medications as prescribed  - follow MD orders  - monitor labs  - See additional Care Plan goals for specific interventions  Outcome: Progressing  Goal: Patient/Family Short Term Goal  Description: Patient's Short Term Goal: to report pain    Interventions:   -PRN pain medications  - testing  - monitor labs  - See additional Care Plan goals for specific interventions  Outcome: Progressing     Problem: GASTROINTESTINAL - ADULT  Goal: Minimal or absence of nausea and vomiting  Description: INTERVENTIONS:  - Maintain adequate hydration with IV or PO as ordered and tolerated  - Nasogastric tube to low intermittent suction as ordered  - Evaluate effectiveness of ordered antiemetic medications  - Provide nonpharmacologic comfort measures as appropriate  - Advance diet as tolerated, if ordered  - Obtain nutritional consult as needed  - Evaluate fluid balance  Outcome: Progressing  Goal: Maintains or returns to baseline bowel function  Description: INTERVENTIONS:  - Assess bowel function  - Maintain adequate hydration with IV or PO as ordered and tolerated  - Evaluate effectiveness of GI medications  - Encourage mobilization and activity  - Obtain nutritional consult as needed  - Establish a toileting routine/schedule  - Consider collaborating with pharmacy to review patient's medication profile  Outcome: Progressing     Problem: METABOLIC/FLUID AND ELECTROLYTES - ADULT  Goal: Glucose maintained within prescribed range  Description: INTERVENTIONS:  - Monitor Blood Glucose as ordered  - Assess for signs and symptoms of hyperglycemia and hypoglycemia  - Administer ordered medications to maintain glucose within target range  - Assess barriers to adequate nutritional intake and initiate nutrition consult as needed  - Instruct patient on self management of diabetes  Outcome: Progressing

## 2022-11-19 LAB
ANION GAP SERPL CALC-SCNC: 5 MMOL/L (ref 0–18)
BASOPHILS # BLD AUTO: 0.07 X10(3) UL (ref 0–0.2)
BASOPHILS NFR BLD AUTO: 0.5 %
BUN BLD-MCNC: 15 MG/DL (ref 7–18)
BUN/CREAT SERPL: 20 (ref 10–20)
CALCIUM BLD-MCNC: 7.8 MG/DL (ref 8.5–10.1)
CHLORIDE SERPL-SCNC: 104 MMOL/L (ref 98–112)
CO2 SERPL-SCNC: 28 MMOL/L (ref 21–32)
CREAT BLD-MCNC: 0.75 MG/DL
DEPRECATED RDW RBC AUTO: 43 FL (ref 35.1–46.3)
EOSINOPHIL # BLD AUTO: 0.42 X10(3) UL (ref 0–0.7)
EOSINOPHIL NFR BLD AUTO: 3 %
ERYTHROCYTE [DISTWIDTH] IN BLOOD BY AUTOMATED COUNT: 12.3 % (ref 11–15)
GFR SERPLBLD BASED ON 1.73 SQ M-ARVRAT: 116 ML/MIN/1.73M2 (ref 60–?)
GLUCOSE BLD-MCNC: 264 MG/DL (ref 70–99)
GLUCOSE BLDC GLUCOMTR-MCNC: 196 MG/DL (ref 70–99)
GLUCOSE BLDC GLUCOMTR-MCNC: 214 MG/DL (ref 70–99)
GLUCOSE BLDC GLUCOMTR-MCNC: 224 MG/DL (ref 70–99)
GLUCOSE BLDC GLUCOMTR-MCNC: 231 MG/DL (ref 70–99)
GLUCOSE BLDC GLUCOMTR-MCNC: 257 MG/DL (ref 70–99)
HCT VFR BLD AUTO: 33.6 %
HGB BLD-MCNC: 10.9 G/DL
IMM GRANULOCYTES # BLD AUTO: 0.45 X10(3) UL (ref 0–1)
IMM GRANULOCYTES NFR BLD: 3.2 %
LYMPHOCYTES # BLD AUTO: 1.01 X10(3) UL (ref 1–4)
LYMPHOCYTES NFR BLD AUTO: 7.2 %
MAGNESIUM SERPL-MCNC: 2.1 MG/DL (ref 1.6–2.6)
MCH RBC QN AUTO: 31 PG (ref 26–34)
MCHC RBC AUTO-ENTMCNC: 32.4 G/DL (ref 31–37)
MCV RBC AUTO: 95.5 FL
MONOCYTES # BLD AUTO: 1.31 X10(3) UL (ref 0.1–1)
MONOCYTES NFR BLD AUTO: 9.3 %
NEUTROPHILS # BLD AUTO: 10.8 X10 (3) UL (ref 1.5–7.7)
NEUTROPHILS # BLD AUTO: 10.8 X10(3) UL (ref 1.5–7.7)
NEUTROPHILS NFR BLD AUTO: 76.8 %
OSMOLALITY SERPL CALC.SUM OF ELEC: 294 MOSM/KG (ref 275–295)
PHOSPHATE SERPL-MCNC: 2.8 MG/DL (ref 2.5–4.9)
PLATELET # BLD AUTO: 219 10(3)UL (ref 150–450)
POTASSIUM SERPL-SCNC: 4.4 MMOL/L (ref 3.5–5.1)
RBC # BLD AUTO: 3.52 X10(6)UL
SODIUM SERPL-SCNC: 137 MMOL/L (ref 136–145)
WBC # BLD AUTO: 14.1 X10(3) UL (ref 4–11)

## 2022-11-19 PROCEDURE — 99232 SBSQ HOSP IP/OBS MODERATE 35: CPT | Performed by: INTERNAL MEDICINE

## 2022-11-19 PROCEDURE — 99233 SBSQ HOSP IP/OBS HIGH 50: CPT | Performed by: HOSPITALIST

## 2022-11-19 NOTE — PROGRESS NOTES
Double RN skin check done prior to transfer off Unit. Skin check performed by this RN and Deidre RN. Wounds are as follows: 4 ex/lap abd sites w/durmabond    Will remain available for any further questions or concerns.

## 2022-11-19 NOTE — PLAN OF CARE
Problem: Patient Centered Care  Goal: Patient preferences are identified and integrated in the patient's plan of care  Description: Interventions:  - What would you like us to know as we care for you?  I was just discharged yesterday  - Provide timely, complete, and accurate information to patient/family  - Incorporate patient and family knowledge, values, beliefs, and cultural backgrounds into the planning and delivery of care  - Encourage patient/family to participate in care and decision-making at the level they choose  - Honor patient and family perspectives and choices  Outcome: Progressing     Problem: Diabetes/Glucose Control  Goal: Glucose maintained within prescribed range  Description: INTERVENTIONS:  - Monitor Blood Glucose as ordered  - Assess for signs and symptoms of hyperglycemia and hypoglycemia  - Administer ordered medications to maintain glucose within target range  - Assess barriers to adequate nutritional intake and initiate nutrition consult as needed  - Instruct patient on self management of diabetes  Outcome: Progressing     Problem: Patient/Family Goals  Goal: Patient/Family Long Term Goal  Description: Patient's Long Term Goal: to go home    Interventions:  - take medications as prescribed  - follow MD orders  - monitor labs  - See additional Care Plan goals for specific interventions  Outcome: Progressing  Goal: Patient/Family Short Term Goal  Description: Patient's Short Term Goal: to report pain    Interventions:   -PRN pain medications  - testing  - monitor labs  - See additional Care Plan goals for specific interventions  Outcome: Progressing     Problem: GASTROINTESTINAL - ADULT  Goal: Minimal or absence of nausea and vomiting  Description: INTERVENTIONS:  - Maintain adequate hydration with IV or PO as ordered and tolerated  - Nasogastric tube to low intermittent suction as ordered  - Evaluate effectiveness of ordered antiemetic medications  - Provide nonpharmacologic comfort measures as appropriate  - Advance diet as tolerated, if ordered  - Obtain nutritional consult as needed  - Evaluate fluid balance  Outcome: Progressing  Goal: Maintains or returns to baseline bowel function  Description: INTERVENTIONS:  - Assess bowel function  - Maintain adequate hydration with IV or PO as ordered and tolerated  - Evaluate effectiveness of GI medications  - Encourage mobilization and activity  - Obtain nutritional consult as needed  - Establish a toileting routine/schedule  - Consider collaborating with pharmacy to review patient's medication profile  Outcome: Progressing     Problem: METABOLIC/FLUID AND ELECTROLYTES - ADULT  Goal: Glucose maintained within prescribed range  Description: INTERVENTIONS:  - Monitor Blood Glucose as ordered  - Assess for signs and symptoms of hyperglycemia and hypoglycemia  - Administer ordered medications to maintain glucose within target range  - Assess barriers to adequate nutritional intake and initiate nutrition consult as needed  - Instruct patient on self management of diabetes  Outcome: Progressing  Goal: Electrolytes maintained within normal limits  Description: INTERVENTIONS:  - Monitor labs and rhythm and assess patient for signs and symptoms of electrolyte imbalances  - Administer electrolyte replacement as ordered  - Monitor response to electrolyte replacements, including rhythm and repeat lab results as appropriate  - Fluid restriction as ordered  - Instruct patient on fluid and nutrition restrictions as appropriate  Outcome: Progressing     Problem: SKIN/TISSUE INTEGRITY - ADULT  Goal: Skin integrity remains intact  Description: INTERVENTIONS  - Assess and document risk factors for pressure ulcer development  - Assess and document skin integrity  - Monitor for areas of redness and/or skin breakdown  - Initiate interventions, skin care algorithm/standards of care as needed  Outcome: Progressing  Goal: Incision(s), wounds(s) or drain site(s) healing without S/S of infection  Description: INTERVENTIONS:  - Assess and document risk factors for pressure ulcer development  - Assess and document skin integrity  - Assess and document dressing/incision, wound bed, drain sites and surrounding tissue  - Implement wound care per orders  - Initiate isolation precautions as appropriate  - Initiate Pressure Ulcer prevention bundle as indicated  Outcome: Progressing     Problem: PAIN - ADULT  Goal: Verbalizes/displays adequate comfort level or patient's stated pain goal  Description: INTERVENTIONS:  - Encourage pt to monitor pain and request assistance  - Assess pain using appropriate pain scale  - Administer analgesics based on type and severity of pain and evaluate response  - Implement non-pharmacological measures as appropriate and evaluate response  - Consider cultural and social influences on pain and pain management  - Manage/alleviate anxiety  - Utilize distraction and/or relaxation techniques  - Monitor for opioid side effects  - Notify MD/LIP if interventions unsuccessful or patient reports new pain  - Anticipate increased pain with activity and pre-medicate as appropriate  Outcome: Progressing     Received patient from [facility], with ongoing TPN via right DL PICC. No acute changes or glycemic reactions noted. Patient continues on IV antibiotics and IV fluids. On scheduled IV Toradol and was also medicated with IV Dilaudid and IV Tylenol PRN. Noted with lap sites x 3 and a midline incision. Reviewed safety precautions and plan of care. Encouraged to use the incentive spirometer and to start ambulation. Patient verbalized understanding but declined to ambulate at this time, stating,\"I'm in too much pain to walk. I will walk with my wife tomorrow morning. \" Continue to monitor.

## 2022-11-20 ENCOUNTER — APPOINTMENT (OUTPATIENT)
Dept: GENERAL RADIOLOGY | Facility: HOSPITAL | Age: 42
DRG: 329 | End: 2022-11-20
Attending: INTERNAL MEDICINE
Payer: COMMERCIAL

## 2022-11-20 ENCOUNTER — APPOINTMENT (OUTPATIENT)
Dept: GENERAL RADIOLOGY | Facility: HOSPITAL | Age: 42
End: 2022-11-20
Attending: INTERNAL MEDICINE
Payer: COMMERCIAL

## 2022-11-20 LAB
ANION GAP SERPL CALC-SCNC: 5 MMOL/L (ref 0–18)
BASOPHILS # BLD: 0 X10(3) UL (ref 0–0.2)
BASOPHILS NFR BLD: 0 %
BUN BLD-MCNC: 12 MG/DL (ref 7–18)
BUN/CREAT SERPL: 16.9 (ref 10–20)
CALCIUM BLD-MCNC: 7.8 MG/DL (ref 8.5–10.1)
CHLORIDE SERPL-SCNC: 102 MMOL/L (ref 98–112)
CO2 SERPL-SCNC: 27 MMOL/L (ref 21–32)
CREAT BLD-MCNC: 0.71 MG/DL
DEPRECATED RDW RBC AUTO: 42.5 FL (ref 35.1–46.3)
EOSINOPHIL # BLD: 0.12 X10(3) UL (ref 0–0.7)
EOSINOPHIL NFR BLD: 1 %
ERYTHROCYTE [DISTWIDTH] IN BLOOD BY AUTOMATED COUNT: 12.2 % (ref 11–15)
GFR SERPLBLD BASED ON 1.73 SQ M-ARVRAT: 118 ML/MIN/1.73M2 (ref 60–?)
GLUCOSE BLD-MCNC: 212 MG/DL (ref 70–99)
GLUCOSE BLDC GLUCOMTR-MCNC: 168 MG/DL (ref 70–99)
GLUCOSE BLDC GLUCOMTR-MCNC: 192 MG/DL (ref 70–99)
GLUCOSE BLDC GLUCOMTR-MCNC: 194 MG/DL (ref 70–99)
GLUCOSE BLDC GLUCOMTR-MCNC: 208 MG/DL (ref 70–99)
GLUCOSE BLDC GLUCOMTR-MCNC: 305 MG/DL (ref 70–99)
HCT VFR BLD AUTO: 31.8 %
HGB BLD-MCNC: 10.6 G/DL
LYMPHOCYTES NFR BLD: 0.37 X10(3) UL (ref 1–4)
LYMPHOCYTES NFR BLD: 3 %
MAGNESIUM SERPL-MCNC: 2 MG/DL (ref 1.6–2.6)
MCH RBC QN AUTO: 31.5 PG (ref 26–34)
MCHC RBC AUTO-ENTMCNC: 33.3 G/DL (ref 31–37)
MCV RBC AUTO: 94.6 FL
MONOCYTES # BLD: 0.24 X10(3) UL (ref 0.1–1)
MONOCYTES NFR BLD: 2 %
MORPHOLOGY: NORMAL
NEUTROPHILS # BLD AUTO: 8.87 X10 (3) UL (ref 1.5–7.7)
NEUTROPHILS NFR BLD: 93 %
NEUTS BAND NFR BLD: 1 %
NEUTS HYPERSEG # BLD: 11.47 X10(3) UL (ref 1.5–7.7)
OSMOLALITY SERPL CALC.SUM OF ELEC: 284 MOSM/KG (ref 275–295)
PHOSPHATE SERPL-MCNC: 3.5 MG/DL (ref 2.5–4.9)
PLATELET # BLD AUTO: 248 10(3)UL (ref 150–450)
PLATELET MORPHOLOGY: NORMAL
POTASSIUM SERPL-SCNC: 4.5 MMOL/L (ref 3.5–5.1)
RBC # BLD AUTO: 3.36 X10(6)UL
SODIUM SERPL-SCNC: 134 MMOL/L (ref 136–145)
TOTAL CELLS COUNTED BLD: 100
TOXIC GRANULES BLD QL SMEAR: PRESENT
WBC # BLD AUTO: 12.2 X10(3) UL (ref 4–11)

## 2022-11-20 PROCEDURE — 99232 SBSQ HOSP IP/OBS MODERATE 35: CPT | Performed by: INTERNAL MEDICINE

## 2022-11-20 PROCEDURE — 71045 X-RAY EXAM CHEST 1 VIEW: CPT | Performed by: INTERNAL MEDICINE

## 2022-11-20 PROCEDURE — 99233 SBSQ HOSP IP/OBS HIGH 50: CPT | Performed by: HOSPITALIST

## 2022-11-20 NOTE — PLAN OF CARE
Problem: Patient Centered Care  Goal: Patient preferences are identified and integrated in the patient's plan of care  Description: Interventions:  - What would you like us to know as we care for you?  I was just discharged yesterday  - Provide timely, complete, and accurate information to patient/family  - Incorporate patient and family knowledge, values, beliefs, and cultural backgrounds into the planning and delivery of care  - Encourage patient/family to participate in care and decision-making at the level they choose  - Honor patient and family perspectives and choices  Outcome: Progressing     Problem: Diabetes/Glucose Control  Goal: Glucose maintained within prescribed range  Description: INTERVENTIONS:  - Monitor Blood Glucose as ordered  - Assess for signs and symptoms of hyperglycemia and hypoglycemia  - Administer ordered medications to maintain glucose within target range  - Assess barriers to adequate nutritional intake and initiate nutrition consult as needed  - Instruct patient on self management of diabetes  Outcome: Progressing     Problem: Patient/Family Goals  Goal: Patient/Family Long Term Goal  Description: Patient's Long Term Goal: to go home    Interventions:  - take medications as prescribed  - follow MD orders  - monitor labs  - See additional Care Plan goals for specific interventions  Outcome: Progressing  Goal: Patient/Family Short Term Goal  Description: Patient's Short Term Goal: to report pain    Interventions:   -PRN pain medications  - testing  - monitor labs  - See additional Care Plan goals for specific interventions  Outcome: Progressing     Problem: GASTROINTESTINAL - ADULT  Goal: Minimal or absence of nausea and vomiting  Description: INTERVENTIONS:  - Maintain adequate hydration with IV or PO as ordered and tolerated  - Nasogastric tube to low intermittent suction as ordered  - Evaluate effectiveness of ordered antiemetic medications  - Provide nonpharmacologic comfort measures as appropriate  - Advance diet as tolerated, if ordered  - Obtain nutritional consult as needed  - Evaluate fluid balance  Outcome: Progressing  Goal: Maintains or returns to baseline bowel function  Description: INTERVENTIONS:  - Assess bowel function  - Maintain adequate hydration with IV or PO as ordered and tolerated  - Evaluate effectiveness of GI medications  - Encourage mobilization and activity  - Obtain nutritional consult as needed  - Establish a toileting routine/schedule  - Consider collaborating with pharmacy to review patient's medication profile  Outcome: Progressing     Problem: METABOLIC/FLUID AND ELECTROLYTES - ADULT  Goal: Glucose maintained within prescribed range  Description: INTERVENTIONS:  - Monitor Blood Glucose as ordered  - Assess for signs and symptoms of hyperglycemia and hypoglycemia  - Administer ordered medications to maintain glucose within target range  - Assess barriers to adequate nutritional intake and initiate nutrition consult as needed  - Instruct patient on self management of diabetes  Outcome: Progressing  Goal: Electrolytes maintained within normal limits  Description: INTERVENTIONS:  - Monitor labs and rhythm and assess patient for signs and symptoms of electrolyte imbalances  - Administer electrolyte replacement as ordered  - Monitor response to electrolyte replacements, including rhythm and repeat lab results as appropriate  - Fluid restriction as ordered  - Instruct patient on fluid and nutrition restrictions as appropriate  Outcome: Progressing     Problem: SKIN/TISSUE INTEGRITY - ADULT  Goal: Skin integrity remains intact  Description: INTERVENTIONS  - Assess and document risk factors for pressure ulcer development  - Assess and document skin integrity  - Monitor for areas of redness and/or skin breakdown  - Initiate interventions, skin care algorithm/standards of care as needed  Outcome: Progressing  Goal: Incision(s), wounds(s) or drain site(s) healing without S/S of infection  Description: INTERVENTIONS:  - Assess and document risk factors for pressure ulcer development  - Assess and document skin integrity  - Assess and document dressing/incision, wound bed, drain sites and surrounding tissue  - Implement wound care per orders  - Initiate isolation precautions as appropriate  - Initiate Pressure Ulcer prevention bundle as indicated  Outcome: Progressing     Problem: PAIN - ADULT  Goal: Verbalizes/displays adequate comfort level or patient's stated pain goal  Description: INTERVENTIONS:  - Encourage pt to monitor pain and request assistance  - Assess pain using appropriate pain scale  - Administer analgesics based on type and severity of pain and evaluate response  - Implement non-pharmacological measures as appropriate and evaluate response  - Consider cultural and social influences on pain and pain management  - Manage/alleviate anxiety  - Utilize distraction and/or relaxation techniques  - Monitor for opioid side effects  - Notify MD/LIP if interventions unsuccessful or patient reports new pain  - Anticipate increased pain with activity and pre-medicate as appropriate  Outcome: Progressing     Pt resting in bed. Tolerating sips of clears. IVF continued, TPN maintained. IV antibiotics given. Dilaudid prn for pain. Tylenol in evening for temp 100.6. Accuchecks q6hr. Dressings to abdomen c/d/I. Abdominal binder in use. Safety measures in place. Frequent rounding being done.

## 2022-11-21 LAB
ANION GAP SERPL CALC-SCNC: 9 MMOL/L (ref 0–18)
BASOPHILS # BLD: 0 X10(3) UL (ref 0–0.2)
BASOPHILS NFR BLD: 0 %
BUN BLD-MCNC: 15 MG/DL (ref 7–18)
BUN/CREAT SERPL: 20 (ref 10–20)
CALCIUM BLD-MCNC: 8.4 MG/DL (ref 8.5–10.1)
CHLORIDE SERPL-SCNC: 102 MMOL/L (ref 98–112)
CO2 SERPL-SCNC: 25 MMOL/L (ref 21–32)
CREAT BLD-MCNC: 0.75 MG/DL
DEPRECATED RDW RBC AUTO: 42.6 FL (ref 35.1–46.3)
EOSINOPHIL # BLD: 0.29 X10(3) UL (ref 0–0.7)
EOSINOPHIL NFR BLD: 2 %
ERYTHROCYTE [DISTWIDTH] IN BLOOD BY AUTOMATED COUNT: 12.2 % (ref 11–15)
GFR SERPLBLD BASED ON 1.73 SQ M-ARVRAT: 116 ML/MIN/1.73M2 (ref 60–?)
GLUCOSE BLD-MCNC: 225 MG/DL (ref 70–99)
GLUCOSE BLDC GLUCOMTR-MCNC: 167 MG/DL (ref 70–99)
GLUCOSE BLDC GLUCOMTR-MCNC: 223 MG/DL (ref 70–99)
GLUCOSE BLDC GLUCOMTR-MCNC: 226 MG/DL (ref 70–99)
GLUCOSE BLDC GLUCOMTR-MCNC: 238 MG/DL (ref 70–99)
HCT VFR BLD AUTO: 35 %
HGB BLD-MCNC: 11.4 G/DL
LYMPHOCYTES NFR BLD: 0.29 X10(3) UL (ref 1–4)
LYMPHOCYTES NFR BLD: 2 %
MAGNESIUM SERPL-MCNC: 2 MG/DL (ref 1.6–2.6)
MCH RBC QN AUTO: 30.9 PG (ref 26–34)
MCHC RBC AUTO-ENTMCNC: 32.6 G/DL (ref 31–37)
MCV RBC AUTO: 94.9 FL
MONOCYTES # BLD: 1.46 X10(3) UL (ref 0.1–1)
MONOCYTES NFR BLD: 10 %
NEUTROPHILS # BLD AUTO: 11.27 X10 (3) UL (ref 1.5–7.7)
NEUTROPHILS NFR BLD: 83 %
NEUTS BAND NFR BLD: 3 %
NEUTS HYPERSEG # BLD: 12.56 X10(3) UL (ref 1.5–7.7)
OSMOLALITY SERPL CALC.SUM OF ELEC: 290 MOSM/KG (ref 275–295)
PHOSPHATE SERPL-MCNC: 3.7 MG/DL (ref 2.5–4.9)
PLATELET # BLD AUTO: 328 10(3)UL (ref 150–450)
PLATELET MORPHOLOGY: NORMAL
POTASSIUM SERPL-SCNC: 4 MMOL/L (ref 3.5–5.1)
RBC # BLD AUTO: 3.69 X10(6)UL
SODIUM SERPL-SCNC: 136 MMOL/L (ref 136–145)
TOTAL CELLS COUNTED BLD: 100
TRIGL SERPL-MCNC: 210 MG/DL (ref 30–149)
WBC # BLD AUTO: 14.6 X10(3) UL (ref 4–11)

## 2022-11-21 PROCEDURE — 99233 SBSQ HOSP IP/OBS HIGH 50: CPT | Performed by: PHYSICIAN ASSISTANT

## 2022-11-21 PROCEDURE — 99233 SBSQ HOSP IP/OBS HIGH 50: CPT | Performed by: HOSPITALIST

## 2022-11-21 PROCEDURE — 99232 SBSQ HOSP IP/OBS MODERATE 35: CPT | Performed by: INTERNAL MEDICINE

## 2022-11-21 NOTE — CM/SW NOTE
Called by [facility], patient is out of network    Called:    [facility] spoke with Darya Menezes, no beds available for transfer    [facility]- spoke with Conrad Christopher, not doing medical transfers    [place]- face sheet faxed      Update:  Per Md, patient not stable for transfer at this time    Lia Jeter, [address] [facility] , [address]  [phone]

## 2022-11-21 NOTE — PLAN OF CARE
Problem: Patient Centered Care  Goal: Patient preferences are identified and integrated in the patient's plan of care  Description: Interventions:  - What would you like us to know as we care for you?  I was just discharged yesterday  - Provide timely, complete, and accurate information to patient/family  - Incorporate patient and family knowledge, values, beliefs, and cultural backgrounds into the planning and delivery of care  - Encourage patient/family to participate in care and decision-making at the level they choose  - Honor patient and family perspectives and choices  Outcome: Progressing     Problem: Diabetes/Glucose Control  Goal: Glucose maintained within prescribed range  Description: INTERVENTIONS:  - Monitor Blood Glucose as ordered  - Assess for signs and symptoms of hyperglycemia and hypoglycemia  - Administer ordered medications to maintain glucose within target range  - Assess barriers to adequate nutritional intake and initiate nutrition consult as needed  - Instruct patient on self management of diabetes  Outcome: Progressing     Problem: Patient/Family Goals  Goal: Patient/Family Long Term Goal  Description: Patient's Long Term Goal: to go home    Interventions:  - take medications as prescribed  - follow MD orders  - monitor labs  - See additional Care Plan goals for specific interventions  Outcome: Progressing  Goal: Patient/Family Short Term Goal  Description: Patient's Short Term Goal: to report pain    Interventions:   -PRN pain medications  - testing  - monitor labs  - See additional Care Plan goals for specific interventions  Outcome: Progressing     Problem: GASTROINTESTINAL - ADULT  Goal: Minimal or absence of nausea and vomiting  Description: INTERVENTIONS:  - Maintain adequate hydration with IV or PO as ordered and tolerated  - Nasogastric tube to low intermittent suction as ordered  - Evaluate effectiveness of ordered antiemetic medications  - Provide nonpharmacologic comfort measures as appropriate  - Advance diet as tolerated, if ordered  - Obtain nutritional consult as needed  - Evaluate fluid balance  Outcome: Progressing  Goal: Maintains or returns to baseline bowel function  Description: INTERVENTIONS:  - Assess bowel function  - Maintain adequate hydration with IV or PO as ordered and tolerated  - Evaluate effectiveness of GI medications  - Encourage mobilization and activity  - Obtain nutritional consult as needed  - Establish a toileting routine/schedule  - Consider collaborating with pharmacy to review patient's medication profile  Outcome: Progressing     Problem: METABOLIC/FLUID AND ELECTROLYTES - ADULT  Goal: Glucose maintained within prescribed range  Description: INTERVENTIONS:  - Monitor Blood Glucose as ordered  - Assess for signs and symptoms of hyperglycemia and hypoglycemia  - Administer ordered medications to maintain glucose within target range  - Assess barriers to adequate nutritional intake and initiate nutrition consult as needed  - Instruct patient on self management of diabetes  Outcome: Progressing  Goal: Electrolytes maintained within normal limits  Description: INTERVENTIONS:  - Monitor labs and rhythm and assess patient for signs and symptoms of electrolyte imbalances  - Administer electrolyte replacement as ordered  - Monitor response to electrolyte replacements, including rhythm and repeat lab results as appropriate  - Fluid restriction as ordered  - Instruct patient on fluid and nutrition restrictions as appropriate  Outcome: Progressing     Problem: SKIN/TISSUE INTEGRITY - ADULT  Goal: Skin integrity remains intact  Description: INTERVENTIONS  - Assess and document risk factors for pressure ulcer development  - Assess and document skin integrity  - Monitor for areas of redness and/or skin breakdown  - Initiate interventions, skin care algorithm/standards of care as needed  Outcome: Progressing  Goal: Incision(s), wounds(s) or drain site(s) healing without S/S of infection  Description: INTERVENTIONS:  - Assess and document risk factors for pressure ulcer development  - Assess and document skin integrity  - Assess and document dressing/incision, wound bed, drain sites and surrounding tissue  - Implement wound care per orders  - Initiate isolation precautions as appropriate  - Initiate Pressure Ulcer prevention bundle as indicated  Outcome: Progressing     Problem: PAIN - ADULT  Goal: Verbalizes/displays adequate comfort level or patient's stated pain goal  Description: INTERVENTIONS:  - Encourage pt to monitor pain and request assistance  - Assess pain using appropriate pain scale  - Administer analgesics based on type and severity of pain and evaluate response  - Implement non-pharmacological measures as appropriate and evaluate response  - Consider cultural and social influences on pain and pain management  - Manage/alleviate anxiety  - Utilize distraction and/or relaxation techniques  - Monitor for opioid side effects  - Notify MD/LIP if interventions unsuccessful or patient reports new pain  - Anticipate increased pain with activity and pre-medicate as appropriate  Outcome: Progressing     Pt resting in bed. Ambulates in room with minimal assist. TPN continued. Tolerating full liquid diet. IV antibiotic given. IV dilaudid prn for pain. Accucheck Q6hr. Voiding without difficulty. Safety measures in place. Frequent rounding being done.

## 2022-11-22 ENCOUNTER — APPOINTMENT (OUTPATIENT)
Dept: CT IMAGING | Facility: HOSPITAL | Age: 42
End: 2022-11-22
Attending: SURGERY
Payer: COMMERCIAL

## 2022-11-22 ENCOUNTER — APPOINTMENT (OUTPATIENT)
Dept: CT IMAGING | Facility: HOSPITAL | Age: 42
DRG: 329 | End: 2022-11-22
Attending: INTERNAL MEDICINE
Payer: COMMERCIAL

## 2022-11-22 ENCOUNTER — APPOINTMENT (OUTPATIENT)
Dept: CT IMAGING | Facility: HOSPITAL | Age: 42
DRG: 329 | End: 2022-11-22
Attending: SURGERY
Payer: COMMERCIAL

## 2022-11-22 ENCOUNTER — APPOINTMENT (OUTPATIENT)
Dept: GENERAL RADIOLOGY | Facility: HOSPITAL | Age: 42
End: 2022-11-22
Attending: PHYSICIAN ASSISTANT
Payer: COMMERCIAL

## 2022-11-22 ENCOUNTER — APPOINTMENT (OUTPATIENT)
Dept: CT IMAGING | Facility: HOSPITAL | Age: 42
End: 2022-11-22
Attending: INTERNAL MEDICINE
Payer: COMMERCIAL

## 2022-11-22 ENCOUNTER — APPOINTMENT (OUTPATIENT)
Dept: GENERAL RADIOLOGY | Facility: HOSPITAL | Age: 42
DRG: 329 | End: 2022-11-22
Attending: PHYSICIAN ASSISTANT
Payer: COMMERCIAL

## 2022-11-22 LAB
ANION GAP SERPL CALC-SCNC: 6 MMOL/L (ref 0–18)
BASOPHILS # BLD: 0.16 X10(3) UL (ref 0–0.2)
BASOPHILS NFR BLD: 1 %
BUN BLD-MCNC: 15 MG/DL (ref 7–18)
BUN/CREAT SERPL: 22.4 (ref 10–20)
CALCIUM BLD-MCNC: 8.3 MG/DL (ref 8.5–10.1)
CHLORIDE SERPL-SCNC: 104 MMOL/L (ref 98–112)
CO2 SERPL-SCNC: 26 MMOL/L (ref 21–32)
CREAT BLD-MCNC: 0.67 MG/DL
DEPRECATED RDW RBC AUTO: 42.3 FL (ref 35.1–46.3)
EOSINOPHIL # BLD: 0.32 X10(3) UL (ref 0–0.7)
EOSINOPHIL NFR BLD: 2 %
ERYTHROCYTE [DISTWIDTH] IN BLOOD BY AUTOMATED COUNT: 12.1 % (ref 11–15)
GFR SERPLBLD BASED ON 1.73 SQ M-ARVRAT: 120 ML/MIN/1.73M2 (ref 60–?)
GLUCOSE BLD-MCNC: 158 MG/DL (ref 70–99)
GLUCOSE BLDC GLUCOMTR-MCNC: 138 MG/DL (ref 70–99)
GLUCOSE BLDC GLUCOMTR-MCNC: 161 MG/DL (ref 70–99)
GLUCOSE BLDC GLUCOMTR-MCNC: 162 MG/DL (ref 70–99)
GLUCOSE BLDC GLUCOMTR-MCNC: 163 MG/DL (ref 70–99)
GLUCOSE BLDC GLUCOMTR-MCNC: 237 MG/DL (ref 70–99)
GLUCOSE BLDC GLUCOMTR-MCNC: 71 MG/DL (ref 70–99)
HCT VFR BLD AUTO: 35.3 %
HGB BLD-MCNC: 11.6 G/DL
LYMPHOCYTES NFR BLD: 1.13 X10(3) UL (ref 1–4)
LYMPHOCYTES NFR BLD: 7 %
MAGNESIUM SERPL-MCNC: 2.1 MG/DL (ref 1.6–2.6)
MCH RBC QN AUTO: 31 PG (ref 26–34)
MCHC RBC AUTO-ENTMCNC: 32.9 G/DL (ref 31–37)
MCV RBC AUTO: 94.4 FL
MONOCYTES # BLD: 1.77 X10(3) UL (ref 0.1–1)
MONOCYTES NFR BLD: 11 %
MORPHOLOGY: NORMAL
NEUTROPHILS # BLD AUTO: 12.11 X10 (3) UL (ref 1.5–7.7)
NEUTROPHILS NFR BLD: 75 %
NEUTS BAND NFR BLD: 4 %
NEUTS HYPERSEG # BLD: 12.72 X10(3) UL (ref 1.5–7.7)
OSMOLALITY SERPL CALC.SUM OF ELEC: 286 MOSM/KG (ref 275–295)
PHOSPHATE SERPL-MCNC: 3.8 MG/DL (ref 2.5–4.9)
PLATELET # BLD AUTO: 375 10(3)UL (ref 150–450)
POTASSIUM SERPL-SCNC: 4.4 MMOL/L (ref 3.5–5.1)
RBC # BLD AUTO: 3.74 X10(6)UL
SODIUM SERPL-SCNC: 136 MMOL/L (ref 136–145)
TOTAL CELLS COUNTED BLD: 100
TOXIC GRANULES BLD QL SMEAR: PRESENT
WBC # BLD AUTO: 16.1 X10(3) UL (ref 4–11)

## 2022-11-22 PROCEDURE — 99232 SBSQ HOSP IP/OBS MODERATE 35: CPT | Performed by: INTERNAL MEDICINE

## 2022-11-22 PROCEDURE — 71260 CT THORAX DX C+: CPT | Performed by: INTERNAL MEDICINE

## 2022-11-22 PROCEDURE — 71045 X-RAY EXAM CHEST 1 VIEW: CPT | Performed by: PHYSICIAN ASSISTANT

## 2022-11-22 PROCEDURE — 74177 CT ABD & PELVIS W/CONTRAST: CPT | Performed by: SURGERY

## 2022-11-22 PROCEDURE — 99233 SBSQ HOSP IP/OBS HIGH 50: CPT | Performed by: HOSPITALIST

## 2022-11-22 RX ORDER — HYDROCODONE BITARTRATE AND ACETAMINOPHEN 10; 325 MG/1; MG/1
1 TABLET ORAL EVERY 4 HOURS PRN
Status: DISCONTINUED | OUTPATIENT
Start: 2022-11-22 | End: 2022-11-29

## 2022-11-22 NOTE — PLAN OF CARE
Problem: Patient Centered Care  Goal: Patient preferences are identified and integrated in the patient's plan of care  Description: Interventions:  - What would you like us to know as we care for you?  I was just discharged yesterday  - Provide timely, complete, and accurate information to patient/family  - Incorporate patient and family knowledge, values, beliefs, and cultural backgrounds into the planning and delivery of care  - Encourage patient/family to participate in care and decision-making at the level they choose  - Honor patient and family perspectives and choices  Outcome: Progressing     Problem: Diabetes/Glucose Control  Goal: Glucose maintained within prescribed range  Description: INTERVENTIONS:  - Monitor Blood Glucose as ordered  - Assess for signs and symptoms of hyperglycemia and hypoglycemia  - Administer ordered medications to maintain glucose within target range  - Assess barriers to adequate nutritional intake and initiate nutrition consult as needed  - Instruct patient on self management of diabetes  Outcome: Progressing     Problem: Patient/Family Goals  Goal: Patient/Family Long Term Goal  Description: Patient's Long Term Goal: to go home    Interventions:  - take medications as prescribed  - follow MD orders  - monitor labs  - See additional Care Plan goals for specific interventions  Outcome: Progressing  Goal: Patient/Family Short Term Goal  Description: Patient's Short Term Goal: to report pain    Interventions:   -PRN pain medications  - testing  - monitor labs  - See additional Care Plan goals for specific interventions  Outcome: Progressing     Problem: GASTROINTESTINAL - ADULT  Goal: Minimal or absence of nausea and vomiting  Description: INTERVENTIONS:  - Maintain adequate hydration with IV or PO as ordered and tolerated  - Nasogastric tube to low intermittent suction as ordered  - Evaluate effectiveness of ordered antiemetic medications  - Provide nonpharmacologic comfort measures as appropriate  - Advance diet as tolerated, if ordered  - Obtain nutritional consult as needed  - Evaluate fluid balance  Outcome: Progressing  Goal: Maintains or returns to baseline bowel function  Description: INTERVENTIONS:  - Assess bowel function  - Maintain adequate hydration with IV or PO as ordered and tolerated  - Evaluate effectiveness of GI medications  - Encourage mobilization and activity  - Obtain nutritional consult as needed  - Establish a toileting routine/schedule  - Consider collaborating with pharmacy to review patient's medication profile  Outcome: Progressing     Problem: METABOLIC/FLUID AND ELECTROLYTES - ADULT  Goal: Glucose maintained within prescribed range  Description: INTERVENTIONS:  - Monitor Blood Glucose as ordered  - Assess for signs and symptoms of hyperglycemia and hypoglycemia  - Administer ordered medications to maintain glucose within target range  - Assess barriers to adequate nutritional intake and initiate nutrition consult as needed  - Instruct patient on self management of diabetes  Outcome: Progressing  Goal: Electrolytes maintained within normal limits  Description: INTERVENTIONS:  - Monitor labs and rhythm and assess patient for signs and symptoms of electrolyte imbalances  - Administer electrolyte replacement as ordered  - Monitor response to electrolyte replacements, including rhythm and repeat lab results as appropriate  - Fluid restriction as ordered  - Instruct patient on fluid and nutrition restrictions as appropriate  Outcome: Progressing     Problem: SKIN/TISSUE INTEGRITY - ADULT  Goal: Skin integrity remains intact  Description: INTERVENTIONS  - Assess and document risk factors for pressure ulcer development  - Assess and document skin integrity  - Monitor for areas of redness and/or skin breakdown  - Initiate interventions, skin care algorithm/standards of care as needed  Outcome: Progressing  Goal: Incision(s), wounds(s) or drain site(s) healing without S/S of infection  Description: INTERVENTIONS:  - Assess and document risk factors for pressure ulcer development  - Assess and document skin integrity  - Assess and document dressing/incision, wound bed, drain sites and surrounding tissue  - Implement wound care per orders  - Initiate isolation precautions as appropriate  - Initiate Pressure Ulcer prevention bundle as indicated  Outcome: Progressing     Problem: PAIN - ADULT  Goal: Verbalizes/displays adequate comfort level or patient's stated pain goal  Description: INTERVENTIONS:  - Encourage pt to monitor pain and request assistance  - Assess pain using appropriate pain scale  - Administer analgesics based on type and severity of pain and evaluate response  - Implement non-pharmacological measures as appropriate and evaluate response  - Consider cultural and social influences on pain and pain management  - Manage/alleviate anxiety  - Utilize distraction and/or relaxation techniques  - Monitor for opioid side effects  - Notify MD/LIP if interventions unsuccessful or patient reports new pain  - Anticipate increased pain with activity and pre-medicate as appropriate  Outcome: Progressing     Pt resting in bed. Ambulates in room with minimal assist. Tolerating diet. TPN continued. Accuchecks q6hr. IV antibiotics given. Dressings to lap sites c/d/I-no abdominal binder in use overnight. Dilaudid and tylenol prn for pain. Small BMs overnight-unwitnessed by RN. Safety measures in place. Frequent rounding being done.

## 2022-11-22 NOTE — PLAN OF CARE
Vitals within normal limits. Room Air. Ambulating self. Pain is controlled with PRN Norco. Dilaudid discontinued. CT with oral contrast - pending results. Had no breakfast and no lunch - pt stated he did not want to overdue it. Refused lunch due to intake of 2 bottles of contrast. Had dinner and ate 50% of it. Denies nausea or vomiting. AC/HS. Last bag of TPN infusing at this time. +BM. HALEY PICC dressing to be changed. Call light within reach. Will continue tomorrow. Problem: Patient Centered Care  Goal: Patient preferences are identified and integrated in the patient's plan of care  Description: Interventions:  - What would you like us to know as we care for you?  I was just discharged yesterday  - Provide timely, complete, and accurate information to patient/family  - Incorporate patient and family knowledge, values, beliefs, and cultural backgrounds into the planning and delivery of care  - Encourage patient/family to participate in care and decision-making at the level they choose  - Honor patient and family perspectives and choices  Outcome: Progressing     Problem: Diabetes/Glucose Control  Goal: Glucose maintained within prescribed range  Description: INTERVENTIONS:  - Monitor Blood Glucose as ordered  - Assess for signs and symptoms of hyperglycemia and hypoglycemia  - Administer ordered medications to maintain glucose within target range  - Assess barriers to adequate nutritional intake and initiate nutrition consult as needed  - Instruct patient on self management of diabetes  Outcome: Progressing     Problem: Patient/Family Goals  Goal: Patient/Family Long Term Goal  Description: Patient's Long Term Goal: to go home    Interventions:  - take medications as prescribed  - follow MD orders  - monitor labs  - See additional Care Plan goals for specific interventions  Outcome: Progressing  Goal: Patient/Family Short Term Goal  Description: Patient's Short Term Goal: to report pain    Interventions:   -PRN pain medications  - testing  - monitor labs  - See additional Care Plan goals for specific interventions  Outcome: Progressing     Problem: GASTROINTESTINAL - ADULT  Goal: Minimal or absence of nausea and vomiting  Description: INTERVENTIONS:  - Maintain adequate hydration with IV or PO as ordered and tolerated  - Nasogastric tube to low intermittent suction as ordered  - Evaluate effectiveness of ordered antiemetic medications  - Provide nonpharmacologic comfort measures as appropriate  - Advance diet as tolerated, if ordered  - Obtain nutritional consult as needed  - Evaluate fluid balance  Outcome: Progressing  Goal: Maintains or returns to baseline bowel function  Description: INTERVENTIONS:  - Assess bowel function  - Maintain adequate hydration with IV or PO as ordered and tolerated  - Evaluate effectiveness of GI medications  - Encourage mobilization and activity  - Obtain nutritional consult as needed  - Establish a toileting routine/schedule  - Consider collaborating with pharmacy to review patient's medication profile  Outcome: Progressing     Problem: METABOLIC/FLUID AND ELECTROLYTES - ADULT  Goal: Glucose maintained within prescribed range  Description: INTERVENTIONS:  - Monitor Blood Glucose as ordered  - Assess for signs and symptoms of hyperglycemia and hypoglycemia  - Administer ordered medications to maintain glucose within target range  - Assess barriers to adequate nutritional intake and initiate nutrition consult as needed  - Instruct patient on self management of diabetes  Outcome: Progressing  Goal: Electrolytes maintained within normal limits  Description: INTERVENTIONS:  - Monitor labs and rhythm and assess patient for signs and symptoms of electrolyte imbalances  - Administer electrolyte replacement as ordered  - Monitor response to electrolyte replacements, including rhythm and repeat lab results as appropriate  - Fluid restriction as ordered  - Instruct patient on fluid and nutrition restrictions as appropriate  Outcome: Progressing     Problem: PAIN - ADULT  Goal: Verbalizes/displays adequate comfort level or patient's stated pain goal  Description: INTERVENTIONS:  - Encourage pt to monitor pain and request assistance  - Assess pain using appropriate pain scale  - Administer analgesics based on type and severity of pain and evaluate response  - Implement non-pharmacological measures as appropriate and evaluate response  - Consider cultural and social influences on pain and pain management  - Manage/alleviate anxiety  - Utilize distraction and/or relaxation techniques  - Monitor for opioid side effects  - Notify MD/LIP if interventions unsuccessful or patient reports new pain  - Anticipate increased pain with activity and pre-medicate as appropriate  Outcome: Progressing     Problem: SKIN/TISSUE INTEGRITY - ADULT  Goal: Skin integrity remains intact  Description: INTERVENTIONS  - Assess and document risk factors for pressure ulcer development  - Assess and document skin integrity  - Monitor for areas of redness and/or skin breakdown  - Initiate interventions, skin care algorithm/standards of care as needed  Outcome: Progressing  Goal: Incision(s), wounds(s) or drain site(s) healing without S/S of infection  Description: INTERVENTIONS:  - Assess and document risk factors for pressure ulcer development  - Assess and document skin integrity  - Assess and document dressing/incision, wound bed, drain sites and surrounding tissue  - Implement wound care per orders  - Initiate isolation precautions as appropriate  - Initiate Pressure Ulcer prevention bundle as indicated  Outcome: Progressing

## 2022-11-23 ENCOUNTER — APPOINTMENT (OUTPATIENT)
Dept: CT IMAGING | Facility: HOSPITAL | Age: 42
DRG: 329 | End: 2022-11-23
Attending: NURSE PRACTITIONER
Payer: COMMERCIAL

## 2022-11-23 ENCOUNTER — TELEPHONE (OUTPATIENT)
Dept: ENDOCRINOLOGY CLINIC | Facility: CLINIC | Age: 42
End: 2022-11-23

## 2022-11-23 ENCOUNTER — APPOINTMENT (OUTPATIENT)
Dept: CT IMAGING | Facility: HOSPITAL | Age: 42
End: 2022-11-23
Attending: NURSE PRACTITIONER
Payer: COMMERCIAL

## 2022-11-23 LAB
ANION GAP SERPL CALC-SCNC: 7 MMOL/L (ref 0–18)
BASOPHILS # BLD AUTO: 0.11 X10(3) UL (ref 0–0.2)
BASOPHILS NFR BLD AUTO: 0.6 %
BUN BLD-MCNC: 14 MG/DL (ref 7–18)
BUN/CREAT SERPL: 19.7 (ref 10–20)
CALCIUM BLD-MCNC: 8.5 MG/DL (ref 8.5–10.1)
CHLORIDE SERPL-SCNC: 102 MMOL/L (ref 98–112)
CO2 SERPL-SCNC: 27 MMOL/L (ref 21–32)
CREAT BLD-MCNC: 0.71 MG/DL
DEPRECATED RDW RBC AUTO: 43.1 FL (ref 35.1–46.3)
EOSINOPHIL # BLD AUTO: 0.36 X10(3) UL (ref 0–0.7)
EOSINOPHIL NFR BLD AUTO: 2.1 %
ERYTHROCYTE [DISTWIDTH] IN BLOOD BY AUTOMATED COUNT: 12.3 % (ref 11–15)
GFR SERPLBLD BASED ON 1.73 SQ M-ARVRAT: 118 ML/MIN/1.73M2 (ref 60–?)
GLUCOSE BLD-MCNC: 131 MG/DL (ref 70–99)
GLUCOSE BLDC GLUCOMTR-MCNC: 102 MG/DL (ref 70–99)
GLUCOSE BLDC GLUCOMTR-MCNC: 119 MG/DL (ref 70–99)
GLUCOSE BLDC GLUCOMTR-MCNC: 197 MG/DL (ref 70–99)
HCT VFR BLD AUTO: 32.9 %
HGB BLD-MCNC: 10.9 G/DL
IMM GRANULOCYTES # BLD AUTO: 0.45 X10(3) UL (ref 0–1)
IMM GRANULOCYTES NFR BLD: 2.6 %
LYMPHOCYTES # BLD AUTO: 1.42 X10(3) UL (ref 1–4)
LYMPHOCYTES NFR BLD AUTO: 8.2 %
MCH RBC QN AUTO: 31.7 PG (ref 26–34)
MCHC RBC AUTO-ENTMCNC: 33.1 G/DL (ref 31–37)
MCV RBC AUTO: 95.6 FL
MONOCYTES # BLD AUTO: 1.29 X10(3) UL (ref 0.1–1)
MONOCYTES NFR BLD AUTO: 7.5 %
NEUTROPHILS # BLD AUTO: 13.6 X10 (3) UL (ref 1.5–7.7)
NEUTROPHILS # BLD AUTO: 13.6 X10(3) UL (ref 1.5–7.7)
NEUTROPHILS NFR BLD AUTO: 79 %
OSMOLALITY SERPL CALC.SUM OF ELEC: 284 MOSM/KG (ref 275–295)
PLATELET # BLD AUTO: 286 10(3)UL (ref 150–450)
POTASSIUM SERPL-SCNC: 4.2 MMOL/L (ref 3.5–5.1)
RBC # BLD AUTO: 3.44 X10(6)UL
SODIUM SERPL-SCNC: 136 MMOL/L (ref 136–145)
WBC # BLD AUTO: 17.2 X10(3) UL (ref 4–11)

## 2022-11-23 PROCEDURE — 49406 IMAGE CATH FLUID PERI/RETRO: CPT | Performed by: NURSE PRACTITIONER

## 2022-11-23 PROCEDURE — 99233 SBSQ HOSP IP/OBS HIGH 50: CPT | Performed by: HOSPITALIST

## 2022-11-23 PROCEDURE — 0W9H3ZZ DRAINAGE OF RETROPERITONEUM, PERCUTANEOUS APPROACH: ICD-10-PCS | Performed by: RADIOLOGY

## 2022-11-23 PROCEDURE — 99152 MOD SED SAME PHYS/QHP 5/>YRS: CPT | Performed by: NURSE PRACTITIONER

## 2022-11-23 PROCEDURE — 99232 SBSQ HOSP IP/OBS MODERATE 35: CPT | Performed by: INTERNAL MEDICINE

## 2022-11-23 RX ORDER — LORAZEPAM 2 MG/ML
1 INJECTION INTRAMUSCULAR 2 TIMES DAILY
Status: DISCONTINUED | OUTPATIENT
Start: 2022-11-23 | End: 2022-11-23

## 2022-11-23 RX ORDER — LORAZEPAM 1 MG/1
1 TABLET ORAL 2 TIMES DAILY
Status: DISCONTINUED | OUTPATIENT
Start: 2022-11-23 | End: 2022-11-29

## 2022-11-23 RX ORDER — MIDAZOLAM HYDROCHLORIDE 1 MG/ML
INJECTION INTRAMUSCULAR; INTRAVENOUS
Status: COMPLETED
Start: 2022-11-23 | End: 2022-11-23

## 2022-11-23 RX ORDER — SODIUM CHLORIDE 9 MG/ML
INJECTION, SOLUTION INTRAVENOUS CONTINUOUS
Status: DISCONTINUED | OUTPATIENT
Start: 2022-11-23 | End: 2022-11-23

## 2022-11-23 RX ORDER — FLUMAZENIL 0.1 MG/ML
0.2 INJECTION INTRAVENOUS AS NEEDED
Status: DISCONTINUED | OUTPATIENT
Start: 2022-11-23 | End: 2022-11-23

## 2022-11-23 RX ORDER — MIDAZOLAM HYDROCHLORIDE 1 MG/ML
1 INJECTION INTRAMUSCULAR; INTRAVENOUS EVERY 5 MIN PRN
Status: DISCONTINUED | OUTPATIENT
Start: 2022-11-23 | End: 2022-11-23

## 2022-11-23 RX ORDER — IBUPROFEN 400 MG/1
400 TABLET ORAL EVERY 4 HOURS PRN
Status: DISCONTINUED | OUTPATIENT
Start: 2022-11-23 | End: 2022-11-29

## 2022-11-23 RX ORDER — NALOXONE HYDROCHLORIDE 0.4 MG/ML
80 INJECTION, SOLUTION INTRAMUSCULAR; INTRAVENOUS; SUBCUTANEOUS AS NEEDED
Status: DISCONTINUED | OUTPATIENT
Start: 2022-11-23 | End: 2022-11-23

## 2022-11-23 NOTE — PROGRESS NOTES
CT showed lower abd fluid collection that could be the cause of his persistent leukocytosis. Recommend IR consult for percutaneous drainage.

## 2022-11-23 NOTE — PLAN OF CARE
No acute medical changes during the shift. Patient is A&O x4. Prn norco for pain. TPN completed. IV abx infusing per order. Tolerating LF/S diet. Accuchecks ACHS. Right PICC line; dressing changed this AM. Abdominal incisions c/d/I. Plan of care discussed; all questions answered. Safety precautions in place. Call light and belongings at bedside and within reach. Frequent monitoring. Problem: Patient Centered Care  Goal: Patient preferences are identified and integrated in the patient's plan of care  Description: Interventions:  - What would you like us to know as we care for you?  I was just discharged yesterday  - Provide timely, complete, and accurate information to patient/family  - Incorporate patient and family knowledge, values, beliefs, and cultural backgrounds into the planning and delivery of care  - Encourage patient/family to participate in care and decision-making at the level they choose  - Honor patient and family perspectives and choices  Outcome: Progressing     Problem: Diabetes/Glucose Control  Goal: Glucose maintained within prescribed range  Description: INTERVENTIONS:  - Monitor Blood Glucose as ordered  - Assess for signs and symptoms of hyperglycemia and hypoglycemia  - Administer ordered medications to maintain glucose within target range  - Assess barriers to adequate nutritional intake and initiate nutrition consult as needed  - Instruct patient on self management of diabetes  Outcome: Progressing     Problem: Patient/Family Goals  Goal: Patient/Family Long Term Goal  Description: Patient's Long Term Goal: to go home    Interventions:  - take medications as prescribed  - follow MD orders  - monitor labs  - See additional Care Plan goals for specific interventions  Outcome: Progressing  Goal: Patient/Family Short Term Goal  Description: Patient's Short Term Goal: to report pain    Interventions:   -PRN pain medications  - testing  - monitor labs  - See additional Care Plan goals for specific interventions  Outcome: Progressing     Problem: GASTROINTESTINAL - ADULT  Goal: Minimal or absence of nausea and vomiting  Description: INTERVENTIONS:  - Maintain adequate hydration with IV or PO as ordered and tolerated  - Nasogastric tube to low intermittent suction as ordered  - Evaluate effectiveness of ordered antiemetic medications  - Provide nonpharmacologic comfort measures as appropriate  - Advance diet as tolerated, if ordered  - Obtain nutritional consult as needed  - Evaluate fluid balance  Outcome: Progressing  Goal: Maintains or returns to baseline bowel function  Description: INTERVENTIONS:  - Assess bowel function  - Maintain adequate hydration with IV or PO as ordered and tolerated  - Evaluate effectiveness of GI medications  - Encourage mobilization and activity  - Obtain nutritional consult as needed  - Establish a toileting routine/schedule  - Consider collaborating with pharmacy to review patient's medication profile  Outcome: Progressing     Problem: METABOLIC/FLUID AND ELECTROLYTES - ADULT  Goal: Glucose maintained within prescribed range  Description: INTERVENTIONS:  - Monitor Blood Glucose as ordered  - Assess for signs and symptoms of hyperglycemia and hypoglycemia  - Administer ordered medications to maintain glucose within target range  - Assess barriers to adequate nutritional intake and initiate nutrition consult as needed  - Instruct patient on self management of diabetes  Outcome: Progressing  Goal: Electrolytes maintained within normal limits  Description: INTERVENTIONS:  - Monitor labs and rhythm and assess patient for signs and symptoms of electrolyte imbalances  - Administer electrolyte replacement as ordered  - Monitor response to electrolyte replacements, including rhythm and repeat lab results as appropriate  - Fluid restriction as ordered  - Instruct patient on fluid and nutrition restrictions as appropriate  Outcome: Progressing     Problem: PAIN - ADULT  Goal: Verbalizes/displays adequate comfort level or patient's stated pain goal  Description: INTERVENTIONS:  - Encourage pt to monitor pain and request assistance  - Assess pain using appropriate pain scale  - Administer analgesics based on type and severity of pain and evaluate response  - Implement non-pharmacological measures as appropriate and evaluate response  - Consider cultural and social influences on pain and pain management  - Manage/alleviate anxiety  - Utilize distraction and/or relaxation techniques  - Monitor for opioid side effects  - Notify MD/LIP if interventions unsuccessful or patient reports new pain  - Anticipate increased pain with activity and pre-medicate as appropriate  Outcome: Progressing     Problem: SKIN/TISSUE INTEGRITY - ADULT  Goal: Incision(s), wounds(s) or drain site(s) healing without S/S of infection  Description: INTERVENTIONS:  - Assess and document risk factors for pressure ulcer development  - Assess and document skin integrity  - Assess and document dressing/incision, wound bed, drain sites and surrounding tissue  - Implement wound care per orders  - Initiate isolation precautions as appropriate  - Initiate Pressure Ulcer prevention bundle as indicated  Outcome: Progressing

## 2022-11-23 NOTE — BRIEF PROCEDURE NOTE
[facility]  Procedure [facility]    Debra Caller Marie Mauro Patient Status:  Inpatient    10/19/1980 MRN [id number]   Location [facility] 4W/SW/SE Attending Joshua Damian MD   [facility] Day # 15 PCP Severo Lights     Procedure: CT-guided percutaneous abscess drainage    Pre-Procedure Diagnosis: Postop pelvic abscess    Post-Procedure Diagnosis: Same    Anesthesia:  Sedation    Findings: 10 [place] drain placed within large pelvic abscess, 170 cc purulent appearing fluid aspirated    Specimens: Fluid for C&S  Blood Loss: minimal     Complications:  None    Drains: 10 [place] APD        Paola Beltre MD  2022

## 2022-11-23 NOTE — TELEPHONE ENCOUNTER
Per Dr. Sun Ellis: schedule patient with APN within 2 weeks or first available - if more than 2 weeks, schedule with  CAMI Borrero    Spoke to patient and wife - f/u scheduled with PRACHI Wright in [address] [place] [address] on 12/12/22 - patient's wife stated that at this time they would like to hold off on meeting with [facility]

## 2022-11-23 NOTE — PLAN OF CARE
Pt A&O x4. NPO since 0830 due to needing an IR consult for fluid collection in his abdomen. Primary RN reached out to MD Dillon for clarification about the consult and got no response. Pt complained of abdominal pain when up and moving - gave PRN norco. MARYSOL drain placed - drained 40mL. Pt had low fever - RN Galina gave PRN motrin. Wife at bedside. Bed in lowest position. Call light within reach. Safety precautions in place. Frequent rounding was done. Problem: Patient Centered Care  Goal: Patient preferences are identified and integrated in the patient's plan of care  Description: Interventions:  - What would you like us to know as we care for you?  I was just discharged yesterday  - Provide timely, complete, and accurate information to patient/family  - Incorporate patient and family knowledge, values, beliefs, and cultural backgrounds into the planning and delivery of care  - Encourage patient/family to participate in care and decision-making at the level they choose  - Honor patient and family perspectives and choices  Outcome: Progressing     Problem: Diabetes/Glucose Control  Goal: Glucose maintained within prescribed range  Description: INTERVENTIONS:  - Monitor Blood Glucose as ordered  - Assess for signs and symptoms of hyperglycemia and hypoglycemia  - Administer ordered medications to maintain glucose within target range  - Assess barriers to adequate nutritional intake and initiate nutrition consult as needed  - Instruct patient on self management of diabetes  Outcome: Progressing     Problem: Patient/Family Goals  Goal: Patient/Family Long Term Goal  Description: Patient's Long Term Goal: to go home    Interventions:  - take medications as prescribed  - follow MD orders  - monitor labs  - See additional Care Plan goals for specific interventions  Outcome: Progressing  Goal: Patient/Family Short Term Goal  Description: Patient's Short Term Goal: to report pain    Interventions:   -PRN pain medications  - testing  - monitor labs  - See additional Care Plan goals for specific interventions  Outcome: Progressing     Problem: GASTROINTESTINAL - ADULT  Goal: Minimal or absence of nausea and vomiting  Description: INTERVENTIONS:  - Maintain adequate hydration with IV or PO as ordered and tolerated  - Nasogastric tube to low intermittent suction as ordered  - Evaluate effectiveness of ordered antiemetic medications  - Provide nonpharmacologic comfort measures as appropriate  - Advance diet as tolerated, if ordered  - Obtain nutritional consult as needed  - Evaluate fluid balance  Outcome: Progressing  Goal: Maintains or returns to baseline bowel function  Description: INTERVENTIONS:  - Assess bowel function  - Maintain adequate hydration with IV or PO as ordered and tolerated  - Evaluate effectiveness of GI medications  - Encourage mobilization and activity  - Obtain nutritional consult as needed  - Establish a toileting routine/schedule  - Consider collaborating with pharmacy to review patient's medication profile  Outcome: Progressing     Problem: METABOLIC/FLUID AND ELECTROLYTES - ADULT  Goal: Glucose maintained within prescribed range  Description: INTERVENTIONS:  - Monitor Blood Glucose as ordered  - Assess for signs and symptoms of hyperglycemia and hypoglycemia  - Administer ordered medications to maintain glucose within target range  - Assess barriers to adequate nutritional intake and initiate nutrition consult as needed  - Instruct patient on self management of diabetes  Outcome: Progressing  Goal: Electrolytes maintained within normal limits  Description: INTERVENTIONS:  - Monitor labs and rhythm and assess patient for signs and symptoms of electrolyte imbalances  - Administer electrolyte replacement as ordered  - Monitor response to electrolyte replacements, including rhythm and repeat lab results as appropriate  - Fluid restriction as ordered  - Instruct patient on fluid and nutrition restrictions as appropriate  Outcome: Progressing     Problem: PAIN - ADULT  Goal: Verbalizes/displays adequate comfort level or patient's stated pain goal  Description: INTERVENTIONS:  - Encourage pt to monitor pain and request assistance  - Assess pain using appropriate pain scale  - Administer analgesics based on type and severity of pain and evaluate response  - Implement non-pharmacological measures as appropriate and evaluate response  - Consider cultural and social influences on pain and pain management  - Manage/alleviate anxiety  - Utilize distraction and/or relaxation techniques  - Monitor for opioid side effects  - Notify MD/LIP if interventions unsuccessful or patient reports new pain  - Anticipate increased pain with activity and pre-medicate as appropriate  Outcome: Progressing     Problem: SKIN/TISSUE INTEGRITY - ADULT  Goal: Skin integrity remains intact  Description: INTERVENTIONS  - Assess and document risk factors for pressure ulcer development  - Assess and document skin integrity  - Monitor for areas of redness and/or skin breakdown  - Initiate interventions, skin care algorithm/standards of care as needed  Outcome: Progressing  Goal: Incision(s), wounds(s) or drain site(s) healing without S/S of infection  Description: INTERVENTIONS:  - Assess and document risk factors for pressure ulcer development  - Assess and document skin integrity  - Assess and document dressing/incision, wound bed, drain sites and surrounding tissue  - Implement wound care per orders  - Initiate isolation precautions as appropriate  - Initiate Pressure Ulcer prevention bundle as indicated  Outcome: Progressing

## 2022-11-23 NOTE — IMAGING NOTE
Pt to CT Holding for recovery after right pelvic abscess drain & drain placement. See Sedation Note. See VS Flowsheet. Pt drowsy but says has good recall of procedure, felt relaxed. Drain site remains w/o bleeding/hematoma/ecchymosis, drain intact, dressing clean/dry/intact. Pt more awake after short time, texting w/spouse. Gave pt apple juice, tolerated well. Transported pt on cart back to his room, assisted to bed, report at 1600 to [place], [facility]. Post-Sedation orders in. Specimen taken to lab.

## 2022-11-24 LAB
ANION GAP SERPL CALC-SCNC: 7 MMOL/L (ref 0–18)
BUN BLD-MCNC: 19 MG/DL (ref 7–18)
BUN/CREAT SERPL: 20.2 (ref 10–20)
CALCIUM BLD-MCNC: 8.8 MG/DL (ref 8.5–10.1)
CHLORIDE SERPL-SCNC: 99 MMOL/L (ref 98–112)
CO2 SERPL-SCNC: 27 MMOL/L (ref 21–32)
CREAT BLD-MCNC: 0.94 MG/DL
DEPRECATED RDW RBC AUTO: 43.1 FL (ref 35.1–46.3)
ERYTHROCYTE [DISTWIDTH] IN BLOOD BY AUTOMATED COUNT: 12.4 % (ref 11–15)
GFR SERPLBLD BASED ON 1.73 SQ M-ARVRAT: 104 ML/MIN/1.73M2 (ref 60–?)
GLUCOSE BLD-MCNC: 177 MG/DL (ref 70–99)
GLUCOSE BLDC GLUCOMTR-MCNC: 186 MG/DL (ref 70–99)
GLUCOSE BLDC GLUCOMTR-MCNC: 187 MG/DL (ref 70–99)
GLUCOSE BLDC GLUCOMTR-MCNC: 85 MG/DL (ref 70–99)
HCT VFR BLD AUTO: 32.3 %
HGB BLD-MCNC: 10.6 G/DL
MCH RBC QN AUTO: 30.9 PG (ref 26–34)
MCHC RBC AUTO-ENTMCNC: 32.8 G/DL (ref 31–37)
MCV RBC AUTO: 94.2 FL
OSMOLALITY SERPL CALC.SUM OF ELEC: 283 MOSM/KG (ref 275–295)
PLATELET # BLD AUTO: 381 10(3)UL (ref 150–450)
POTASSIUM SERPL-SCNC: 4.6 MMOL/L (ref 3.5–5.1)
RBC # BLD AUTO: 3.43 X10(6)UL
SODIUM SERPL-SCNC: 133 MMOL/L (ref 136–145)
WBC # BLD AUTO: 16.5 X10(3) UL (ref 4–11)

## 2022-11-24 PROCEDURE — 99024 POSTOP FOLLOW-UP VISIT: CPT | Performed by: SURGERY

## 2022-11-24 PROCEDURE — 99232 SBSQ HOSP IP/OBS MODERATE 35: CPT | Performed by: INTERNAL MEDICINE

## 2022-11-24 PROCEDURE — 99233 SBSQ HOSP IP/OBS HIGH 50: CPT | Performed by: HOSPITALIST

## 2022-11-24 NOTE — PLAN OF CARE
Patient's pain managed with Norco. Afebrile overnight. Wife at bedside. PICC line was pulled out accidentally, MD aware. New IV reinserted by Sohail Jackson. Tolerating diet, no nausea or vomiting. IV antibiotics administered. Ambulatory. Safety precautions in place. Problem: Patient Centered Care  Goal: Patient preferences are identified and integrated in the patient's plan of care  Description: Interventions:  - What would you like us to know as we care for you?  I was just discharged yesterday  - Provide timely, complete, and accurate information to patient/family  - Incorporate patient and family knowledge, values, beliefs, and cultural backgrounds into the planning and delivery of care  - Encourage patient/family to participate in care and decision-making at the level they choose  - Honor patient and family perspectives and choices  Outcome: Progressing     Problem: Diabetes/Glucose Control  Goal: Glucose maintained within prescribed range  Description: INTERVENTIONS:  - Monitor Blood Glucose as ordered  - Assess for signs and symptoms of hyperglycemia and hypoglycemia  - Administer ordered medications to maintain glucose within target range  - Assess barriers to adequate nutritional intake and initiate nutrition consult as needed  - Instruct patient on self management of diabetes  Outcome: Progressing     Problem: Patient/Family Goals  Goal: Patient/Family Long Term Goal  Description: Patient's Long Term Goal: to go home    Interventions:  - take medications as prescribed  - follow MD orders  - monitor labs  - See additional Care Plan goals for specific interventions  Outcome: Progressing  Goal: Patient/Family Short Term Goal  Description: Patient's Short Term Goal: to report pain    Interventions:   -PRN pain medications  - testing  - monitor labs  - See additional Care Plan goals for specific interventions  Outcome: Progressing     Problem: GASTROINTESTINAL - ADULT  Goal: Minimal or absence of nausea and vomiting  Description: INTERVENTIONS:  - Maintain adequate hydration with IV or PO as ordered and tolerated  - Nasogastric tube to low intermittent suction as ordered  - Evaluate effectiveness of ordered antiemetic medications  - Provide nonpharmacologic comfort measures as appropriate  - Advance diet as tolerated, if ordered  - Obtain nutritional consult as needed  - Evaluate fluid balance  Outcome: Progressing  Goal: Maintains or returns to baseline bowel function  Description: INTERVENTIONS:  - Assess bowel function  - Maintain adequate hydration with IV or PO as ordered and tolerated  - Evaluate effectiveness of GI medications  - Encourage mobilization and activity  - Obtain nutritional consult as needed  - Establish a toileting routine/schedule  - Consider collaborating with pharmacy to review patient's medication profile  Outcome: Progressing     Problem: METABOLIC/FLUID AND ELECTROLYTES - ADULT  Goal: Glucose maintained within prescribed range  Description: INTERVENTIONS:  - Monitor Blood Glucose as ordered  - Assess for signs and symptoms of hyperglycemia and hypoglycemia  - Administer ordered medications to maintain glucose within target range  - Assess barriers to adequate nutritional intake and initiate nutrition consult as needed  - Instruct patient on self management of diabetes  Outcome: Progressing  Goal: Electrolytes maintained within normal limits  Description: INTERVENTIONS:  - Monitor labs and rhythm and assess patient for signs and symptoms of electrolyte imbalances  - Administer electrolyte replacement as ordered  - Monitor response to electrolyte replacements, including rhythm and repeat lab results as appropriate  - Fluid restriction as ordered  - Instruct patient on fluid and nutrition restrictions as appropriate  Outcome: Progressing     Problem: PAIN - ADULT  Goal: Verbalizes/displays adequate comfort level or patient's stated pain goal  Description: INTERVENTIONS:  - Encourage pt to monitor pain and request assistance  - Assess pain using appropriate pain scale  - Administer analgesics based on type and severity of pain and evaluate response  - Implement non-pharmacological measures as appropriate and evaluate response  - Consider cultural and social influences on pain and pain management  - Manage/alleviate anxiety  - Utilize distraction and/or relaxation techniques  - Monitor for opioid side effects  - Notify MD/LIP if interventions unsuccessful or patient reports new pain  - Anticipate increased pain with activity and pre-medicate as appropriate  Outcome: Progressing     Problem: SKIN/TISSUE INTEGRITY - ADULT  Goal: Skin integrity remains intact  Description: INTERVENTIONS  - Assess and document risk factors for pressure ulcer development  - Assess and document skin integrity  - Monitor for areas of redness and/or skin breakdown  - Initiate interventions, skin care algorithm/standards of care as needed  Outcome: Progressing  Goal: Incision(s), wounds(s) or drain site(s) healing without S/S of infection  Description: INTERVENTIONS:  - Assess and document risk factors for pressure ulcer development  - Assess and document skin integrity  - Assess and document dressing/incision, wound bed, drain sites and surrounding tissue  - Implement wound care per orders  - Initiate isolation precautions as appropriate  - Initiate Pressure Ulcer prevention bundle as indicated  Outcome: Progressing

## 2022-11-25 LAB
ANION GAP SERPL CALC-SCNC: 8 MMOL/L (ref 0–18)
BASOPHILS # BLD AUTO: 0.08 X10(3) UL (ref 0–0.2)
BASOPHILS NFR BLD AUTO: 0.7 %
BUN BLD-MCNC: 22 MG/DL (ref 7–18)
BUN/CREAT SERPL: 21.8 (ref 10–20)
CALCIUM BLD-MCNC: 8.8 MG/DL (ref 8.5–10.1)
CHLORIDE SERPL-SCNC: 98 MMOL/L (ref 98–112)
CO2 SERPL-SCNC: 26 MMOL/L (ref 21–32)
CREAT BLD-MCNC: 1.01 MG/DL
DEPRECATED RDW RBC AUTO: 45.3 FL (ref 35.1–46.3)
EOSINOPHIL # BLD AUTO: 0.31 X10(3) UL (ref 0–0.7)
EOSINOPHIL NFR BLD AUTO: 2.7 %
ERYTHROCYTE [DISTWIDTH] IN BLOOD BY AUTOMATED COUNT: 12.4 % (ref 11–15)
GFR SERPLBLD BASED ON 1.73 SQ M-ARVRAT: 96 ML/MIN/1.73M2 (ref 60–?)
GLUCOSE BLD-MCNC: 368 MG/DL (ref 70–99)
GLUCOSE BLDC GLUCOMTR-MCNC: 175 MG/DL (ref 70–99)
GLUCOSE BLDC GLUCOMTR-MCNC: 247 MG/DL (ref 70–99)
GLUCOSE BLDC GLUCOMTR-MCNC: 328 MG/DL (ref 70–99)
GLUCOSE BLDC GLUCOMTR-MCNC: 341 MG/DL (ref 70–99)
HCT VFR BLD AUTO: 33.7 %
HGB BLD-MCNC: 10.4 G/DL
IMM GRANULOCYTES # BLD AUTO: 0.24 X10(3) UL (ref 0–1)
IMM GRANULOCYTES NFR BLD: 2.1 %
LYMPHOCYTES # BLD AUTO: 1.68 X10(3) UL (ref 1–4)
LYMPHOCYTES NFR BLD AUTO: 14.9 %
MCH RBC QN AUTO: 30.7 PG (ref 26–34)
MCHC RBC AUTO-ENTMCNC: 30.9 G/DL (ref 31–37)
MCV RBC AUTO: 99.4 FL
MONOCYTES # BLD AUTO: 1.07 X10(3) UL (ref 0.1–1)
MONOCYTES NFR BLD AUTO: 9.5 %
NEUTROPHILS # BLD AUTO: 7.91 X10 (3) UL (ref 1.5–7.7)
NEUTROPHILS # BLD AUTO: 7.91 X10(3) UL (ref 1.5–7.7)
NEUTROPHILS NFR BLD AUTO: 70.1 %
OSMOLALITY SERPL CALC.SUM OF ELEC: 292 MOSM/KG (ref 275–295)
PLATELET # BLD AUTO: 405 10(3)UL (ref 150–450)
POTASSIUM SERPL-SCNC: 5 MMOL/L (ref 3.5–5.1)
RBC # BLD AUTO: 3.39 X10(6)UL
SODIUM SERPL-SCNC: 132 MMOL/L (ref 136–145)
WBC # BLD AUTO: 11.3 X10(3) UL (ref 4–11)

## 2022-11-25 PROCEDURE — 99232 SBSQ HOSP IP/OBS MODERATE 35: CPT | Performed by: INTERNAL MEDICINE

## 2022-11-25 PROCEDURE — 99233 SBSQ HOSP IP/OBS HIGH 50: CPT | Performed by: HOSPITALIST

## 2022-11-25 PROCEDURE — 99024 POSTOP FOLLOW-UP VISIT: CPT | Performed by: SURGERY

## 2022-11-25 RX ORDER — PANTOPRAZOLE SODIUM 40 MG/1
40 TABLET, DELAYED RELEASE ORAL
Status: DISCONTINUED | OUTPATIENT
Start: 2022-11-25 | End: 2022-11-29

## 2022-11-25 NOTE — PLAN OF CARE
Patient's pain managed with Norco. Fever overnight, relieved with Motrin. Tolerating diet, no nausea or vomiting. IV antibiotics administered. Ambulatory. Safety precautions in place. Problem: Patient Centered Care  Goal: Patient preferences are identified and integrated in the patient's plan of care  Description: Interventions:  - What would you like us to know as we care for you?  I was just discharged yesterday  - Provide timely, complete, and accurate information to patient/family  - Incorporate patient and family knowledge, values, beliefs, and cultural backgrounds into the planning and delivery of care  - Encourage patient/family to participate in care and decision-making at the level they choose  - Honor patient and family perspectives and choices  Outcome: Progressing     Problem: Diabetes/Glucose Control  Goal: Glucose maintained within prescribed range  Description: INTERVENTIONS:  - Monitor Blood Glucose as ordered  - Assess for signs and symptoms of hyperglycemia and hypoglycemia  - Administer ordered medications to maintain glucose within target range  - Assess barriers to adequate nutritional intake and initiate nutrition consult as needed  - Instruct patient on self management of diabetes  Outcome: Progressing     Problem: Patient/Family Goals  Goal: Patient/Family Long Term Goal  Description: Patient's Long Term Goal: to go home    Interventions:  - take medications as prescribed  - follow MD orders  - monitor labs  - See additional Care Plan goals for specific interventions  Outcome: Progressing  Goal: Patient/Family Short Term Goal  Description: Patient's Short Term Goal: to report pain    Interventions:   -PRN pain medications  - testing  - monitor labs  - See additional Care Plan goals for specific interventions  Outcome: Progressing     Problem: GASTROINTESTINAL - ADULT  Goal: Minimal or absence of nausea and vomiting  Description: INTERVENTIONS:  - Maintain adequate hydration with IV or PO as ordered and tolerated  - Nasogastric tube to low intermittent suction as ordered  - Evaluate effectiveness of ordered antiemetic medications  - Provide nonpharmacologic comfort measures as appropriate  - Advance diet as tolerated, if ordered  - Obtain nutritional consult as needed  - Evaluate fluid balance  Outcome: Progressing  Goal: Maintains or returns to baseline bowel function  Description: INTERVENTIONS:  - Assess bowel function  - Maintain adequate hydration with IV or PO as ordered and tolerated  - Evaluate effectiveness of GI medications  - Encourage mobilization and activity  - Obtain nutritional consult as needed  - Establish a toileting routine/schedule  - Consider collaborating with pharmacy to review patient's medication profile  Outcome: Progressing     Problem: METABOLIC/FLUID AND ELECTROLYTES - ADULT  Goal: Glucose maintained within prescribed range  Description: INTERVENTIONS:  - Monitor Blood Glucose as ordered  - Assess for signs and symptoms of hyperglycemia and hypoglycemia  - Administer ordered medications to maintain glucose within target range  - Assess barriers to adequate nutritional intake and initiate nutrition consult as needed  - Instruct patient on self management of diabetes  Outcome: Progressing  Goal: Electrolytes maintained within normal limits  Description: INTERVENTIONS:  - Monitor labs and rhythm and assess patient for signs and symptoms of electrolyte imbalances  - Administer electrolyte replacement as ordered  - Monitor response to electrolyte replacements, including rhythm and repeat lab results as appropriate  - Fluid restriction as ordered  - Instruct patient on fluid and nutrition restrictions as appropriate  Outcome: Progressing     Problem: PAIN - ADULT  Goal: Verbalizes/displays adequate comfort level or patient's stated pain goal  Description: INTERVENTIONS:  - Encourage pt to monitor pain and request assistance  - Assess pain using appropriate pain scale  - Administer analgesics based on type and severity of pain and evaluate response  - Implement non-pharmacological measures as appropriate and evaluate response  - Consider cultural and social influences on pain and pain management  - Manage/alleviate anxiety  - Utilize distraction and/or relaxation techniques  - Monitor for opioid side effects  - Notify MD/LIP if interventions unsuccessful or patient reports new pain  - Anticipate increased pain with activity and pre-medicate as appropriate  Outcome: Progressing     Problem: SKIN/TISSUE INTEGRITY - ADULT  Goal: Skin integrity remains intact  Description: INTERVENTIONS  - Assess and document risk factors for pressure ulcer development  - Assess and document skin integrity  - Monitor for areas of redness and/or skin breakdown  - Initiate interventions, skin care algorithm/standards of care as needed  Outcome: Progressing  Goal: Incision(s), wounds(s) or drain site(s) healing without S/S of infection  Description: INTERVENTIONS:  - Assess and document risk factors for pressure ulcer development  - Assess and document skin integrity  - Assess and document dressing/incision, wound bed, drain sites and surrounding tissue  - Implement wound care per orders  - Initiate isolation precautions as appropriate  - Initiate Pressure Ulcer prevention bundle as indicated  Outcome: Progressing

## 2022-11-25 NOTE — PLAN OF CARE
Patient alert and oriented, wife at bedside. Monitoring vital signs- stable at this time. No acute changes noted throughout shift. Receiving IV antibiotics per MD order. Monitoring blood glucose levels per order. Tolerating diet. Voiding freely. Bowel movement x2 in AM, passing gas. Patient is on room air. SCDs and Heparin for DVT prophylaxis. Pain medication provided as needed. Up independently. Encouraged frequent ambulation and use of incentive spirometer. Dressing in place to Right MARYSOL drain site, emptied total of 25mL serosanguineous output, flushed with 10mL of NS at 8am,. Fall precautions maintained, bed locked in lowest position, call light and personal belongings within reach, non-skid socks in place to bilateral feet. Frequent rounding by nursing staff. Plan for patient to determine antibiotics at discharge per ID and determine whether or not PICC line is required. Problem: Patient Centered Care  Goal: Patient preferences are identified and integrated in the patient's plan of care  Description: Interventions:  - What would you like us to know as we care for you?  I was just discharged yesterday  - Provide timely, complete, and accurate information to patient/family  - Incorporate patient and family knowledge, values, beliefs, and cultural backgrounds into the planning and delivery of care  - Encourage patient/family to participate in care and decision-making at the level they choose  - Honor patient and family perspectives and choices  Outcome: Progressing     Problem: Diabetes/Glucose Control  Goal: Glucose maintained within prescribed range  Description: INTERVENTIONS:  - Monitor Blood Glucose as ordered  - Assess for signs and symptoms of hyperglycemia and hypoglycemia  - Administer ordered medications to maintain glucose within target range  - Assess barriers to adequate nutritional intake and initiate nutrition consult as needed  - Instruct patient on self management of diabetes  Outcome: Progressing     Problem: Patient/Family Goals  Goal: Patient/Family Long Term Goal  Description: Patient's Long Term Goal: to go home    Interventions:  - take medications as prescribed  - follow MD orders  - monitor labs  - See additional Care Plan goals for specific interventions  Outcome: Progressing  Goal: Patient/Family Short Term Goal  Description: Patient's Short Term Goal: to report pain    Interventions:   -PRN pain medications  - testing  - monitor labs  - See additional Care Plan goals for specific interventions  Outcome: Progressing     Problem: GASTROINTESTINAL - ADULT  Goal: Minimal or absence of nausea and vomiting  Description: INTERVENTIONS:  - Maintain adequate hydration with IV or PO as ordered and tolerated  - Nasogastric tube to low intermittent suction as ordered  - Evaluate effectiveness of ordered antiemetic medications  - Provide nonpharmacologic comfort measures as appropriate  - Advance diet as tolerated, if ordered  - Obtain nutritional consult as needed  - Evaluate fluid balance  Outcome: Progressing  Goal: Maintains or returns to baseline bowel function  Description: INTERVENTIONS:  - Assess bowel function  - Maintain adequate hydration with IV or PO as ordered and tolerated  - Evaluate effectiveness of GI medications  - Encourage mobilization and activity  - Obtain nutritional consult as needed  - Establish a toileting routine/schedule  - Consider collaborating with pharmacy to review patient's medication profile  Outcome: Progressing     Problem: METABOLIC/FLUID AND ELECTROLYTES - ADULT  Goal: Glucose maintained within prescribed range  Description: INTERVENTIONS:  - Monitor Blood Glucose as ordered  - Assess for signs and symptoms of hyperglycemia and hypoglycemia  - Administer ordered medications to maintain glucose within target range  - Assess barriers to adequate nutritional intake and initiate nutrition consult as needed  - Instruct patient on self management of diabetes  Outcome: Progressing  Goal: Electrolytes maintained within normal limits  Description: INTERVENTIONS:  - Monitor labs and rhythm and assess patient for signs and symptoms of electrolyte imbalances  - Administer electrolyte replacement as ordered  - Monitor response to electrolyte replacements, including rhythm and repeat lab results as appropriate  - Fluid restriction as ordered  - Instruct patient on fluid and nutrition restrictions as appropriate  Outcome: Progressing     Problem: PAIN - ADULT  Goal: Verbalizes/displays adequate comfort level or patient's stated pain goal  Description: INTERVENTIONS:  - Encourage pt to monitor pain and request assistance  - Assess pain using appropriate pain scale  - Administer analgesics based on type and severity of pain and evaluate response  - Implement non-pharmacological measures as appropriate and evaluate response  - Consider cultural and social influences on pain and pain management  - Manage/alleviate anxiety  - Utilize distraction and/or relaxation techniques  - Monitor for opioid side effects  - Notify MD/LIP if interventions unsuccessful or patient reports new pain  - Anticipate increased pain with activity and pre-medicate as appropriate  Outcome: Progressing     Problem: SKIN/TISSUE INTEGRITY - ADULT  Goal: Skin integrity remains intact  Description: INTERVENTIONS  - Assess and document risk factors for pressure ulcer development  - Assess and document skin integrity  - Monitor for areas of redness and/or skin breakdown  - Initiate interventions, skin care algorithm/standards of care as needed  Outcome: Progressing  Goal: Incision(s), wounds(s) or drain site(s) healing without S/S of infection  Description: INTERVENTIONS:  - Assess and document risk factors for pressure ulcer development  - Assess and document skin integrity  - Assess and document dressing/incision, wound bed, drain sites and surrounding tissue  - Implement wound care per orders  - Initiate isolation precautions as appropriate  - Initiate Pressure Ulcer prevention bundle as indicated  Outcome: Progressing

## 2022-11-26 LAB
GLUCOSE BLDC GLUCOMTR-MCNC: 138 MG/DL (ref 70–99)
GLUCOSE BLDC GLUCOMTR-MCNC: 181 MG/DL (ref 70–99)
GLUCOSE BLDC GLUCOMTR-MCNC: 263 MG/DL (ref 70–99)
GLUCOSE BLDC GLUCOMTR-MCNC: 302 MG/DL (ref 70–99)

## 2022-11-26 PROCEDURE — 99233 SBSQ HOSP IP/OBS HIGH 50: CPT | Performed by: HOSPITALIST

## 2022-11-26 PROCEDURE — 99024 POSTOP FOLLOW-UP VISIT: CPT | Performed by: SURGERY

## 2022-11-26 PROCEDURE — 99232 SBSQ HOSP IP/OBS MODERATE 35: CPT | Performed by: INTERNAL MEDICINE

## 2022-11-26 NOTE — PLAN OF CARE
Patient alert, up in room, medicated for pain as needed, continue IV ABX, MARYSOL drain flushed as ordered, wife at bedside, insulin given as ordered. Problem: Patient Centered Care  Goal: Patient preferences are identified and integrated in the patient's plan of care  Description: Interventions:  - What would you like us to know as we care for you?  I was just discharged yesterday  - Provide timely, complete, and accurate information to patient/family  - Incorporate patient and family knowledge, values, beliefs, and cultural backgrounds into the planning and delivery of care  - Encourage patient/family to participate in care and decision-making at the level they choose  - Honor patient and family perspectives and choices  Outcome: Progressing

## 2022-11-26 NOTE — PLAN OF CARE
Problem: Patient Centered Care  Goal: Patient preferences are identified and integrated in the patient's plan of care  Description: Interventions:  - What would you like us to know as we care for you?  I was just discharged yesterday  - Provide timely, complete, and accurate information to patient/family  - Incorporate patient and family knowledge, values, beliefs, and cultural backgrounds into the planning and delivery of care  - Encourage patient/family to participate in care and decision-making at the level they choose  - Honor patient and family perspectives and choices  Outcome: Progressing     Problem: Diabetes/Glucose Control  Goal: Glucose maintained within prescribed range  Description: INTERVENTIONS:  - Monitor Blood Glucose as ordered  - Assess for signs and symptoms of hyperglycemia and hypoglycemia  - Administer ordered medications to maintain glucose within target range  - Assess barriers to adequate nutritional intake and initiate nutrition consult as needed  - Instruct patient on self management of diabetes  Outcome: Progressing     Problem: Patient/Family Goals  Goal: Patient/Family Long Term Goal  Description: Patient's Long Term Goal: to go home    Interventions:  - take medications as prescribed  - follow MD orders  - monitor labs  - See additional Care Plan goals for specific interventions  Outcome: Progressing  Goal: Patient/Family Short Term Goal  Description: Patient's Short Term Goal: to report pain    Interventions:   -PRN pain medications  - testing  - monitor labs  - See additional Care Plan goals for specific interventions  Outcome: Progressing     Problem: GASTROINTESTINAL - ADULT  Goal: Minimal or absence of nausea and vomiting  Description: INTERVENTIONS:  - Maintain adequate hydration with IV or PO as ordered and tolerated  - Nasogastric tube to low intermittent suction as ordered  - Evaluate effectiveness of ordered antiemetic medications  - Provide nonpharmacologic comfort measures as appropriate  - Advance diet as tolerated, if ordered  - Obtain nutritional consult as needed  - Evaluate fluid balance  Outcome: Progressing  Goal: Maintains or returns to baseline bowel function  Description: INTERVENTIONS:  - Assess bowel function  - Maintain adequate hydration with IV or PO as ordered and tolerated  - Evaluate effectiveness of GI medications  - Encourage mobilization and activity  - Obtain nutritional consult as needed  - Establish a toileting routine/schedule  - Consider collaborating with pharmacy to review patient's medication profile  Outcome: Progressing     Problem: METABOLIC/FLUID AND ELECTROLYTES - ADULT  Goal: Glucose maintained within prescribed range  Description: INTERVENTIONS:  - Monitor Blood Glucose as ordered  - Assess for signs and symptoms of hyperglycemia and hypoglycemia  - Administer ordered medications to maintain glucose within target range  - Assess barriers to adequate nutritional intake and initiate nutrition consult as needed  - Instruct patient on self management of diabetes  Outcome: Progressing  Goal: Electrolytes maintained within normal limits  Description: INTERVENTIONS:  - Monitor labs and rhythm and assess patient for signs and symptoms of electrolyte imbalances  - Administer electrolyte replacement as ordered  - Monitor response to electrolyte replacements, including rhythm and repeat lab results as appropriate  - Fluid restriction as ordered  - Instruct patient on fluid and nutrition restrictions as appropriate  Outcome: Progressing     Problem: SKIN/TISSUE INTEGRITY - ADULT  Goal: Skin integrity remains intact  Description: INTERVENTIONS  - Assess and document risk factors for pressure ulcer development  - Assess and document skin integrity  - Monitor for areas of redness and/or skin breakdown  - Initiate interventions, skin care algorithm/standards of care as needed  Outcome: Progressing  Goal: Incision(s), wounds(s) or drain site(s) healing without S/S of infection  Description: INTERVENTIONS:  - Assess and document risk factors for pressure ulcer development  - Assess and document skin integrity  - Assess and document dressing/incision, wound bed, drain sites and surrounding tissue  - Implement wound care per orders  - Initiate isolation precautions as appropriate  - Initiate Pressure Ulcer prevention bundle as indicated  Outcome: Progressing     Problem: PAIN - ADULT  Goal: Verbalizes/displays adequate comfort level or patient's stated pain goal  Description: INTERVENTIONS:  - Encourage pt to monitor pain and request assistance  - Assess pain using appropriate pain scale  - Administer analgesics based on type and severity of pain and evaluate response  - Implement non-pharmacological measures as appropriate and evaluate response  - Consider cultural and social influences on pain and pain management  - Manage/alleviate anxiety  - Utilize distraction and/or relaxation techniques  - Monitor for opioid side effects  - Notify MD/LIP if interventions unsuccessful or patient reports new pain  - Anticipate increased pain with activity and pre-medicate as appropriate  Outcome: Progressing     Pt resting in bed. Ambulates in room with minimal assist. IV antibiotic given. Norco prn for pain. Mortin for fever of 101. 6-MD notified, blood cultures ordered. Tolerating diet. Accuchecks ACHS. Lap sites c/d/I. MARYSOL to bulb suction-serosanguinous output. Safety measures in place. frequent rounding being done.

## 2022-11-27 LAB
ANION GAP SERPL CALC-SCNC: 5 MMOL/L (ref 0–18)
BASOPHILS # BLD AUTO: 0.09 X10(3) UL (ref 0–0.2)
BASOPHILS NFR BLD AUTO: 0.9 %
BUN BLD-MCNC: 10 MG/DL (ref 7–18)
BUN/CREAT SERPL: 12 (ref 10–20)
CALCIUM BLD-MCNC: 8.3 MG/DL (ref 8.5–10.1)
CHLORIDE SERPL-SCNC: 97 MMOL/L (ref 98–112)
CO2 SERPL-SCNC: 30 MMOL/L (ref 21–32)
CREAT BLD-MCNC: 0.83 MG/DL
DEPRECATED RDW RBC AUTO: 41.8 FL (ref 35.1–46.3)
EOSINOPHIL # BLD AUTO: 0.25 X10(3) UL (ref 0–0.7)
EOSINOPHIL NFR BLD AUTO: 2.6 %
ERYTHROCYTE [DISTWIDTH] IN BLOOD BY AUTOMATED COUNT: 11.9 % (ref 11–15)
GFR SERPLBLD BASED ON 1.73 SQ M-ARVRAT: 113 ML/MIN/1.73M2 (ref 60–?)
GLUCOSE BLD-MCNC: 326 MG/DL (ref 70–99)
GLUCOSE BLDC GLUCOMTR-MCNC: 115 MG/DL (ref 70–99)
GLUCOSE BLDC GLUCOMTR-MCNC: 168 MG/DL (ref 70–99)
GLUCOSE BLDC GLUCOMTR-MCNC: 238 MG/DL (ref 70–99)
GLUCOSE BLDC GLUCOMTR-MCNC: 329 MG/DL (ref 70–99)
HCT VFR BLD AUTO: 30.6 %
HGB BLD-MCNC: 10 G/DL
IMM GRANULOCYTES # BLD AUTO: 0.07 X10(3) UL (ref 0–1)
IMM GRANULOCYTES NFR BLD: 0.7 %
LYMPHOCYTES # BLD AUTO: 1.64 X10(3) UL (ref 1–4)
LYMPHOCYTES NFR BLD AUTO: 16.8 %
MAGNESIUM SERPL-MCNC: 2 MG/DL (ref 1.6–2.6)
MCH RBC QN AUTO: 31.3 PG (ref 26–34)
MCHC RBC AUTO-ENTMCNC: 32.7 G/DL (ref 31–37)
MCV RBC AUTO: 95.6 FL
MONOCYTES # BLD AUTO: 0.94 X10(3) UL (ref 0.1–1)
MONOCYTES NFR BLD AUTO: 9.6 %
NEUTROPHILS # BLD AUTO: 6.8 X10 (3) UL (ref 1.5–7.7)
NEUTROPHILS # BLD AUTO: 6.8 X10(3) UL (ref 1.5–7.7)
NEUTROPHILS NFR BLD AUTO: 69.4 %
OSMOLALITY SERPL CALC.SUM OF ELEC: 286 MOSM/KG (ref 275–295)
PHOSPHATE SERPL-MCNC: 3 MG/DL (ref 2.5–4.9)
PLATELET # BLD AUTO: 454 10(3)UL (ref 150–450)
POTASSIUM SERPL-SCNC: 4.2 MMOL/L (ref 3.5–5.1)
RBC # BLD AUTO: 3.2 X10(6)UL
SODIUM SERPL-SCNC: 132 MMOL/L (ref 136–145)
WBC # BLD AUTO: 9.8 X10(3) UL (ref 4–11)

## 2022-11-27 PROCEDURE — 99233 SBSQ HOSP IP/OBS HIGH 50: CPT | Performed by: HOSPITALIST

## 2022-11-27 PROCEDURE — 99232 SBSQ HOSP IP/OBS MODERATE 35: CPT | Performed by: INTERNAL MEDICINE

## 2022-11-27 NOTE — PLAN OF CARE
Appetite good, lian drain with small amount, shower today, up to chair and ambulating in hallway, zosyn, monitoring blood sugars, norco for pain control. Discharge plan home with possible iv or oral abx. Patient and wife updated on care plan. Problem: Patient Centered Care  Goal: Patient preferences are identified and integrated in the patient's plan of care  Description: Interventions:  - What would you like us to know as we care for you?  I was just discharged yesterday  - Provide timely, complete, and accurate information to patient/family  - Incorporate patient and family knowledge, values, beliefs, and cultural backgrounds into the planning and delivery of care  - Encourage patient/family to participate in care and decision-making at the level they choose  - Honor patient and family perspectives and choices  Outcome: Progressing     Problem: Diabetes/Glucose Control  Goal: Glucose maintained within prescribed range  Description: INTERVENTIONS:  - Monitor Blood Glucose as ordered  - Assess for signs and symptoms of hyperglycemia and hypoglycemia  - Administer ordered medications to maintain glucose within target range  - Assess barriers to adequate nutritional intake and initiate nutrition consult as needed  - Instruct patient on self management of diabetes  Outcome: Progressing     Problem: Patient/Family Goals  Goal: Patient/Family Long Term Goal  Description: Patient's Long Term Goal: to go home    Interventions:  - take medications as prescribed  - follow MD orders  - monitor labs  - See additional Care Plan goals for specific interventions  Outcome: Progressing  Goal: Patient/Family Short Term Goal  Description: Patient's Short Term Goal: to report pain    Interventions:   -PRN pain medications  - testing  - monitor labs  - See additional Care Plan goals for specific interventions  Outcome: Progressing     Problem: GASTROINTESTINAL - ADULT  Goal: Minimal or absence of nausea and vomiting  Description: INTERVENTIONS:  - Maintain adequate hydration with IV or PO as ordered and tolerated  - Nasogastric tube to low intermittent suction as ordered  - Evaluate effectiveness of ordered antiemetic medications  - Provide nonpharmacologic comfort measures as appropriate  - Advance diet as tolerated, if ordered  - Obtain nutritional consult as needed  - Evaluate fluid balance  Outcome: Progressing  Goal: Maintains or returns to baseline bowel function  Description: INTERVENTIONS:  - Assess bowel function  - Maintain adequate hydration with IV or PO as ordered and tolerated  - Evaluate effectiveness of GI medications  - Encourage mobilization and activity  - Obtain nutritional consult as needed  - Establish a toileting routine/schedule  - Consider collaborating with pharmacy to review patient's medication profile  Outcome: Progressing     Problem: METABOLIC/FLUID AND ELECTROLYTES - ADULT  Goal: Glucose maintained within prescribed range  Description: INTERVENTIONS:  - Monitor Blood Glucose as ordered  - Assess for signs and symptoms of hyperglycemia and hypoglycemia  - Administer ordered medications to maintain glucose within target range  - Assess barriers to adequate nutritional intake and initiate nutrition consult as needed  - Instruct patient on self management of diabetes  Outcome: Progressing  Goal: Electrolytes maintained within normal limits  Description: INTERVENTIONS:  - Monitor labs and rhythm and assess patient for signs and symptoms of electrolyte imbalances  - Administer electrolyte replacement as ordered  - Monitor response to electrolyte replacements, including rhythm and repeat lab results as appropriate  - Fluid restriction as ordered  - Instruct patient on fluid and nutrition restrictions as appropriate  Outcome: Progressing     Problem: PAIN - ADULT  Goal: Verbalizes/displays adequate comfort level or patient's stated pain goal  Description: INTERVENTIONS:  - Encourage pt to monitor pain and request assistance  - Assess pain using appropriate pain scale  - Administer analgesics based on type and severity of pain and evaluate response  - Implement non-pharmacological measures as appropriate and evaluate response  - Consider cultural and social influences on pain and pain management  - Manage/alleviate anxiety  - Utilize distraction and/or relaxation techniques  - Monitor for opioid side effects  - Notify MD/LIP if interventions unsuccessful or patient reports new pain  - Anticipate increased pain with activity and pre-medicate as appropriate  Outcome: Progressing     Problem: SKIN/TISSUE INTEGRITY - ADULT  Goal: Skin integrity remains intact  Description: INTERVENTIONS  - Assess and document risk factors for pressure ulcer development  - Assess and document skin integrity  - Monitor for areas of redness and/or skin breakdown  - Initiate interventions, skin care algorithm/standards of care as needed  Outcome: Progressing  Goal: Incision(s), wounds(s) or drain site(s) healing without S/S of infection  Description: INTERVENTIONS:  - Assess and document risk factors for pressure ulcer development  - Assess and document skin integrity  - Assess and document dressing/incision, wound bed, drain sites and surrounding tissue  - Implement wound care per orders  - Initiate isolation precautions as appropriate  - Initiate Pressure Ulcer prevention bundle as indicated  Outcome: Progressing

## 2022-11-27 NOTE — PLAN OF CARE
Pt alert. Medicated for pain with prn norco with good results. MARYSOL drain draining dark red to brown fluid to bulb suction. Accucheck within target range. Tolerating present diet. VSS. Call light within easy reach. No acute distress noted.     Problem: Diabetes/Glucose Control  Goal: Glucose maintained within prescribed range  Description: INTERVENTIONS:  - Monitor Blood Glucose as ordered  - Assess for signs and symptoms of hyperglycemia and hypoglycemia  - Administer ordered medications to maintain glucose within target range  - Assess barriers to adequate nutritional intake and initiate nutrition consult as needed  - Instruct patient on self management of diabetes  Outcome: Progressing     Problem: METABOLIC/FLUID AND ELECTROLYTES - ADULT  Goal: Glucose maintained within prescribed range  Description: INTERVENTIONS:  - Monitor Blood Glucose as ordered  - Assess for signs and symptoms of hyperglycemia and hypoglycemia  - Administer ordered medications to maintain glucose within target range  - Assess barriers to adequate nutritional intake and initiate nutrition consult as needed  - Instruct patient on self management of diabetes  Outcome: Progressing     Problem: PAIN - ADULT  Goal: Verbalizes/displays adequate comfort level or patient's stated pain goal  Description: INTERVENTIONS:  - Encourage pt to monitor pain and request assistance  - Assess pain using appropriate pain scale  - Administer analgesics based on type and severity of pain and evaluate response  - Implement non-pharmacological measures as appropriate and evaluate response  - Consider cultural and social influences on pain and pain management  - Manage/alleviate anxiety  - Utilize distraction and/or relaxation techniques  - Monitor for opioid side effects  - Notify MD/LIP if interventions unsuccessful or patient reports new pain  - Anticipate increased pain with activity and pre-medicate as appropriate  Outcome: Progressing

## 2022-11-27 NOTE — PLAN OF CARE
Tolerating low fiber diet, monitoring blood sugar, lian drain intact, up to chair and ambulating in hallway, norco for pain control, afebrile, iv abx. Patient and wife updated on care plan. Problem: Patient Centered Care  Goal: Patient preferences are identified and integrated in the patient's plan of care  Description: Interventions:  - What would you like us to know as we care for you?  I was just discharged yesterday  - Provide timely, complete, and accurate information to patient/family  - Incorporate patient and family knowledge, values, beliefs, and cultural backgrounds into the planning and delivery of care  - Encourage patient/family to participate in care and decision-making at the level they choose  - Honor patient and family perspectives and choices  Outcome: Progressing     Problem: Diabetes/Glucose Control  Goal: Glucose maintained within prescribed range  Description: INTERVENTIONS:  - Monitor Blood Glucose as ordered  - Assess for signs and symptoms of hyperglycemia and hypoglycemia  - Administer ordered medications to maintain glucose within target range  - Assess barriers to adequate nutritional intake and initiate nutrition consult as needed  - Instruct patient on self management of diabetes  Outcome: Progressing     Problem: Patient/Family Goals  Goal: Patient/Family Long Term Goal  Description: Patient's Long Term Goal: to go home    Interventions:  - take medications as prescribed  - follow MD orders  - monitor labs  - See additional Care Plan goals for specific interventions  Outcome: Progressing  Goal: Patient/Family Short Term Goal  Description: Patient's Short Term Goal: to report pain    Interventions:   -PRN pain medications  - testing  - monitor labs  - See additional Care Plan goals for specific interventions  Outcome: Progressing     Problem: GASTROINTESTINAL - ADULT  Goal: Minimal or absence of nausea and vomiting  Description: INTERVENTIONS:  - Maintain adequate hydration with IV or PO as ordered and tolerated  - Nasogastric tube to low intermittent suction as ordered  - Evaluate effectiveness of ordered antiemetic medications  - Provide nonpharmacologic comfort measures as appropriate  - Advance diet as tolerated, if ordered  - Obtain nutritional consult as needed  - Evaluate fluid balance  Outcome: Progressing  Goal: Maintains or returns to baseline bowel function  Description: INTERVENTIONS:  - Assess bowel function  - Maintain adequate hydration with IV or PO as ordered and tolerated  - Evaluate effectiveness of GI medications  - Encourage mobilization and activity  - Obtain nutritional consult as needed  - Establish a toileting routine/schedule  - Consider collaborating with pharmacy to review patient's medication profile  Outcome: Progressing     Problem: METABOLIC/FLUID AND ELECTROLYTES - ADULT  Goal: Glucose maintained within prescribed range  Description: INTERVENTIONS:  - Monitor Blood Glucose as ordered  - Assess for signs and symptoms of hyperglycemia and hypoglycemia  - Administer ordered medications to maintain glucose within target range  - Assess barriers to adequate nutritional intake and initiate nutrition consult as needed  - Instruct patient on self management of diabetes  Outcome: Progressing  Goal: Electrolytes maintained within normal limits  Description: INTERVENTIONS:  - Monitor labs and rhythm and assess patient for signs and symptoms of electrolyte imbalances  - Administer electrolyte replacement as ordered  - Monitor response to electrolyte replacements, including rhythm and repeat lab results as appropriate  - Fluid restriction as ordered  - Instruct patient on fluid and nutrition restrictions as appropriate  Outcome: Progressing     Problem: PAIN - ADULT  Goal: Verbalizes/displays adequate comfort level or patient's stated pain goal  Description: INTERVENTIONS:  - Encourage pt to monitor pain and request assistance  - Assess pain using appropriate pain scale  - Administer analgesics based on type and severity of pain and evaluate response  - Implement non-pharmacological measures as appropriate and evaluate response  - Consider cultural and social influences on pain and pain management  - Manage/alleviate anxiety  - Utilize distraction and/or relaxation techniques  - Monitor for opioid side effects  - Notify MD/LIP if interventions unsuccessful or patient reports new pain  - Anticipate increased pain with activity and pre-medicate as appropriate  Outcome: Progressing     Problem: SKIN/TISSUE INTEGRITY - ADULT  Goal: Skin integrity remains intact  Description: INTERVENTIONS  - Assess and document risk factors for pressure ulcer development  - Assess and document skin integrity  - Monitor for areas of redness and/or skin breakdown  - Initiate interventions, skin care algorithm/standards of care as needed  Outcome: Progressing  Goal: Incision(s), wounds(s) or drain site(s) healing without S/S of infection  Description: INTERVENTIONS:  - Assess and document risk factors for pressure ulcer development  - Assess and document skin integrity  - Assess and document dressing/incision, wound bed, drain sites and surrounding tissue  - Implement wound care per orders  - Initiate isolation precautions as appropriate  - Initiate Pressure Ulcer prevention bundle as indicated  Outcome: Progressing

## 2022-11-28 ENCOUNTER — APPOINTMENT (OUTPATIENT)
Dept: CT IMAGING | Facility: HOSPITAL | Age: 42
DRG: 329 | End: 2022-11-28
Attending: PHYSICIAN ASSISTANT
Payer: COMMERCIAL

## 2022-11-28 ENCOUNTER — APPOINTMENT (OUTPATIENT)
Dept: CT IMAGING | Facility: HOSPITAL | Age: 42
End: 2022-11-28
Attending: PHYSICIAN ASSISTANT
Payer: COMMERCIAL

## 2022-11-28 LAB
ANION GAP SERPL CALC-SCNC: 5 MMOL/L (ref 0–18)
BASOPHILS # BLD AUTO: 0.07 X10(3) UL (ref 0–0.2)
BASOPHILS NFR BLD AUTO: 0.8 %
BUN BLD-MCNC: 9 MG/DL (ref 7–18)
BUN/CREAT SERPL: 11.8 (ref 10–20)
CALCIUM BLD-MCNC: 8.7 MG/DL (ref 8.5–10.1)
CHLORIDE SERPL-SCNC: 100 MMOL/L (ref 98–112)
CO2 SERPL-SCNC: 30 MMOL/L (ref 21–32)
CREAT BLD-MCNC: 0.76 MG/DL
DEPRECATED RDW RBC AUTO: 42.4 FL (ref 35.1–46.3)
EOSINOPHIL # BLD AUTO: 0.25 X10(3) UL (ref 0–0.7)
EOSINOPHIL NFR BLD AUTO: 3 %
ERYTHROCYTE [DISTWIDTH] IN BLOOD BY AUTOMATED COUNT: 11.9 % (ref 11–15)
GFR SERPLBLD BASED ON 1.73 SQ M-ARVRAT: 116 ML/MIN/1.73M2 (ref 60–?)
GLUCOSE BLD-MCNC: 130 MG/DL (ref 70–99)
GLUCOSE BLDC GLUCOMTR-MCNC: 131 MG/DL (ref 70–99)
GLUCOSE BLDC GLUCOMTR-MCNC: 175 MG/DL (ref 70–99)
GLUCOSE BLDC GLUCOMTR-MCNC: 256 MG/DL (ref 70–99)
GLUCOSE BLDC GLUCOMTR-MCNC: 288 MG/DL (ref 70–99)
HCT VFR BLD AUTO: 32.2 %
HGB BLD-MCNC: 10.2 G/DL
IMM GRANULOCYTES # BLD AUTO: 0.04 X10(3) UL (ref 0–1)
IMM GRANULOCYTES NFR BLD: 0.5 %
LYMPHOCYTES # BLD AUTO: 1.4 X10(3) UL (ref 1–4)
LYMPHOCYTES NFR BLD AUTO: 16.9 %
MAGNESIUM SERPL-MCNC: 2 MG/DL (ref 1.6–2.6)
MCH RBC QN AUTO: 30.8 PG (ref 26–34)
MCHC RBC AUTO-ENTMCNC: 31.7 G/DL (ref 31–37)
MCV RBC AUTO: 97.3 FL
MONOCYTES # BLD AUTO: 0.67 X10(3) UL (ref 0.1–1)
MONOCYTES NFR BLD AUTO: 8.1 %
NEUTROPHILS # BLD AUTO: 5.86 X10 (3) UL (ref 1.5–7.7)
NEUTROPHILS # BLD AUTO: 5.86 X10(3) UL (ref 1.5–7.7)
NEUTROPHILS NFR BLD AUTO: 70.7 %
OSMOLALITY SERPL CALC.SUM OF ELEC: 280 MOSM/KG (ref 275–295)
PHOSPHATE SERPL-MCNC: 3.6 MG/DL (ref 2.5–4.9)
PLATELET # BLD AUTO: 540 10(3)UL (ref 150–450)
POTASSIUM SERPL-SCNC: 4 MMOL/L (ref 3.5–5.1)
RBC # BLD AUTO: 3.31 X10(6)UL
SODIUM SERPL-SCNC: 135 MMOL/L (ref 136–145)
TRIGL SERPL-MCNC: 146 MG/DL (ref 30–149)
WBC # BLD AUTO: 8.3 X10(3) UL (ref 4–11)

## 2022-11-28 PROCEDURE — 74177 CT ABD & PELVIS W/CONTRAST: CPT | Performed by: PHYSICIAN ASSISTANT

## 2022-11-28 PROCEDURE — 99233 SBSQ HOSP IP/OBS HIGH 50: CPT | Performed by: HOSPITALIST

## 2022-11-28 PROCEDURE — 99232 SBSQ HOSP IP/OBS MODERATE 35: CPT | Performed by: INTERNAL MEDICINE

## 2022-11-28 NOTE — PLAN OF CARE
Problem: Patient Centered Care  Goal: Patient preferences are identified and integrated in the patient's plan of care  Description: Interventions:  - What would you like us to know as we care for you?  I was just discharged yesterday  - Provide timely, complete, and accurate information to patient/family  - Incorporate patient and family knowledge, values, beliefs, and cultural backgrounds into the planning and delivery of care  - Encourage patient/family to participate in care and decision-making at the level they choose  - Honor patient and family perspectives and choices  Outcome: Progressing     Problem: Diabetes/Glucose Control  Goal: Glucose maintained within prescribed range  Description: INTERVENTIONS:  - Monitor Blood Glucose as ordered  - Assess for signs and symptoms of hyperglycemia and hypoglycemia  - Administer ordered medications to maintain glucose within target range  - Assess barriers to adequate nutritional intake and initiate nutrition consult as needed  - Instruct patient on self management of diabetes  Outcome: Progressing     Problem: Patient/Family Goals  Goal: Patient/Family Long Term Goal  Description: Patient's Long Term Goal: to go home    Interventions:  - take medications as prescribed  - follow MD orders  - monitor labs  - See additional Care Plan goals for specific interventions  Outcome: Progressing  Goal: Patient/Family Short Term Goal  Description: Patient's Short Term Goal: to report pain    Interventions:   -PRN pain medications  - testing  - monitor labs  - See additional Care Plan goals for specific interventions  Outcome: Progressing     Problem: GASTROINTESTINAL - ADULT  Goal: Minimal or absence of nausea and vomiting  Description: INTERVENTIONS:  - Maintain adequate hydration with IV or PO as ordered and tolerated  - Nasogastric tube to low intermittent suction as ordered  - Evaluate effectiveness of ordered antiemetic medications  - Provide nonpharmacologic comfort measures as appropriate  - Advance diet as tolerated, if ordered  - Obtain nutritional consult as needed  - Evaluate fluid balance  Outcome: Progressing  Goal: Maintains or returns to baseline bowel function  Description: INTERVENTIONS:  - Assess bowel function  - Maintain adequate hydration with IV or PO as ordered and tolerated  - Evaluate effectiveness of GI medications  - Encourage mobilization and activity  - Obtain nutritional consult as needed  - Establish a toileting routine/schedule  - Consider collaborating with pharmacy to review patient's medication profile  Outcome: Progressing     Problem: METABOLIC/FLUID AND ELECTROLYTES - ADULT  Goal: Glucose maintained within prescribed range  Description: INTERVENTIONS:  - Monitor Blood Glucose as ordered  - Assess for signs and symptoms of hyperglycemia and hypoglycemia  - Administer ordered medications to maintain glucose within target range  - Assess barriers to adequate nutritional intake and initiate nutrition consult as needed  - Instruct patient on self management of diabetes  Outcome: Progressing  Goal: Electrolytes maintained within normal limits  Description: INTERVENTIONS:  - Monitor labs and rhythm and assess patient for signs and symptoms of electrolyte imbalances  - Administer electrolyte replacement as ordered  - Monitor response to electrolyte replacements, including rhythm and repeat lab results as appropriate  - Fluid restriction as ordered  - Instruct patient on fluid and nutrition restrictions as appropriate  Outcome: Progressing     Problem: PAIN - ADULT  Goal: Verbalizes/displays adequate comfort level or patient's stated pain goal  Description: INTERVENTIONS:  - Encourage pt to monitor pain and request assistance  - Assess pain using appropriate pain scale  - Administer analgesics based on type and severity of pain and evaluate response  - Implement non-pharmacological measures as appropriate and evaluate response  - Consider cultural and social influences on pain and pain management  - Manage/alleviate anxiety  - Utilize distraction and/or relaxation techniques  - Monitor for opioid side effects  - Notify MD/LIP if interventions unsuccessful or patient reports new pain  - Anticipate increased pain with activity and pre-medicate as appropriate  Outcome: Progressing     Problem: SKIN/TISSUE INTEGRITY - ADULT  Goal: Skin integrity remains intact  Description: INTERVENTIONS  - Assess and document risk factors for pressure ulcer development  - Assess and document skin integrity  - Monitor for areas of redness and/or skin breakdown  - Initiate interventions, skin care algorithm/standards of care as needed  Outcome: Progressing  Goal: Incision(s), wounds(s) or drain site(s) healing without S/S of infection  Description: INTERVENTIONS:  - Assess and document risk factors for pressure ulcer development  - Assess and document skin integrity  - Assess and document dressing/incision, wound bed, drain sites and surrounding tissue  - Implement wound care per orders  - Initiate isolation precautions as appropriate  - Initiate Pressure Ulcer prevention bundle as indicated  Outcome: Progressing   Patient complained moderate pain on right buttock area, MARYSOL drain site improved with Norco. Denies nausea and vomiting. Tolerating diet. Blood sugar monitored. Passing flatus and belching, voids freely. Surgical lap sites clean, dry and intact. IV Zosyn and Diflucan continued. Patient ambulates independently. Plan of care reviewed.

## 2022-11-28 NOTE — CM/SW NOTE
Care Progression Note:  Length of stay: 18  GMLOS:   Avoidable Delays:   Code Status: full    Acute Medical Issue/Factors:   SBO (small bowel obstruction) ([facility] [address] POD 10 small bowel resection X 2. Has percutaneous drain in place for pelvic fluid collection. Surgery, ID following. Awaiting follow up CT scan today. Tolerating a diet and ambulating independently per nursing  Type I DM- Endocrinology following    Discharge Barriers: await final ID plan, cultures, CT scan results  Expected discharge date:    Expected next site of care: Home, pending progress and ID plan. / available for discharge planning.      Iris Alfred MBA MSN, RN CTL/  [id number]

## 2022-11-29 VITALS
WEIGHT: 151.56 LBS | SYSTOLIC BLOOD PRESSURE: 110 MMHG | HEART RATE: 94 BPM | OXYGEN SATURATION: 98 % | BODY MASS INDEX: 20.53 KG/M2 | HEIGHT: 72 IN | TEMPERATURE: 98 F | RESPIRATION RATE: 18 BRPM | DIASTOLIC BLOOD PRESSURE: 73 MMHG

## 2022-11-29 LAB — GLUCOSE BLDC GLUCOMTR-MCNC: 347 MG/DL (ref 70–99)

## 2022-11-29 PROCEDURE — 99239 HOSP IP/OBS DSCHRG MGMT >30: CPT | Performed by: HOSPITALIST

## 2022-11-29 RX ORDER — AMOXICILLIN AND CLAVULANATE POTASSIUM 875; 125 MG/1; MG/1
1 TABLET, FILM COATED ORAL 2 TIMES DAILY
Qty: 28 TABLET | Refills: 0 | Status: SHIPPED | OUTPATIENT
Start: 2022-11-29 | End: 2022-12-13

## 2022-11-29 NOTE — PLAN OF CARE
Vitals within normal limits. Room Air. Ambulating independently. Tolerating diet. AC/HS. CT Abdomen and Pelvis with PO contrast done - pending results. Voiding. +Gas. +Belching. R-MARYSOL Drain with minimal output. Now on Unasyn. Call light within reach. Will continue to monitor. Problem: Patient Centered Care  Goal: Patient preferences are identified and integrated in the patient's plan of care  Description: Interventions:  - What would you like us to know as we care for you?  I was just discharged yesterday  - Provide timely, complete, and accurate information to patient/family  - Incorporate patient and family knowledge, values, beliefs, and cultural backgrounds into the planning and delivery of care  - Encourage patient/family to participate in care and decision-making at the level they choose  - Honor patient and family perspectives and choices  Outcome: Progressing     Problem: Diabetes/Glucose Control  Goal: Glucose maintained within prescribed range  Description: INTERVENTIONS:  - Monitor Blood Glucose as ordered  - Assess for signs and symptoms of hyperglycemia and hypoglycemia  - Administer ordered medications to maintain glucose within target range  - Assess barriers to adequate nutritional intake and initiate nutrition consult as needed  - Instruct patient on self management of diabetes  Outcome: Progressing     Problem: Patient/Family Goals  Goal: Patient/Family Long Term Goal  Description: Patient's Long Term Goal: to go home    Interventions:  - take medications as prescribed  - follow MD orders  - monitor labs  - See additional Care Plan goals for specific interventions  Outcome: Progressing  Goal: Patient/Family Short Term Goal  Description: Patient's Short Term Goal: to report pain    Interventions:   -PRN pain medications  - testing  - monitor labs  - See additional Care Plan goals for specific interventions  Outcome: Progressing     Problem: GASTROINTESTINAL - ADULT  Goal: Minimal or absence of nausea and vomiting  Description: INTERVENTIONS:  - Maintain adequate hydration with IV or PO as ordered and tolerated  - Nasogastric tube to low intermittent suction as ordered  - Evaluate effectiveness of ordered antiemetic medications  - Provide nonpharmacologic comfort measures as appropriate  - Advance diet as tolerated, if ordered  - Obtain nutritional consult as needed  - Evaluate fluid balance  Outcome: Progressing  Goal: Maintains or returns to baseline bowel function  Description: INTERVENTIONS:  - Assess bowel function  - Maintain adequate hydration with IV or PO as ordered and tolerated  - Evaluate effectiveness of GI medications  - Encourage mobilization and activity  - Obtain nutritional consult as needed  - Establish a toileting routine/schedule  - Consider collaborating with pharmacy to review patient's medication profile  Outcome: Progressing     Problem: METABOLIC/FLUID AND ELECTROLYTES - ADULT  Goal: Glucose maintained within prescribed range  Description: INTERVENTIONS:  - Monitor Blood Glucose as ordered  - Assess for signs and symptoms of hyperglycemia and hypoglycemia  - Administer ordered medications to maintain glucose within target range  - Assess barriers to adequate nutritional intake and initiate nutrition consult as needed  - Instruct patient on self management of diabetes  Outcome: Progressing  Goal: Electrolytes maintained within normal limits  Description: INTERVENTIONS:  - Monitor labs and rhythm and assess patient for signs and symptoms of electrolyte imbalances  - Administer electrolyte replacement as ordered  - Monitor response to electrolyte replacements, including rhythm and repeat lab results as appropriate  - Fluid restriction as ordered  - Instruct patient on fluid and nutrition restrictions as appropriate  Outcome: Progressing     Problem: PAIN - ADULT  Goal: Verbalizes/displays adequate comfort level or patient's stated pain goal  Description: INTERVENTIONS:  - Encourage pt to monitor pain and request assistance  - Assess pain using appropriate pain scale  - Administer analgesics based on type and severity of pain and evaluate response  - Implement non-pharmacological measures as appropriate and evaluate response  - Consider cultural and social influences on pain and pain management  - Manage/alleviate anxiety  - Utilize distraction and/or relaxation techniques  - Monitor for opioid side effects  - Notify MD/LIP if interventions unsuccessful or patient reports new pain  - Anticipate increased pain with activity and pre-medicate as appropriate  Outcome: Progressing     Problem: SKIN/TISSUE INTEGRITY - ADULT  Goal: Skin integrity remains intact  Description: INTERVENTIONS  - Assess and document risk factors for pressure ulcer development  - Assess and document skin integrity  - Monitor for areas of redness and/or skin breakdown  - Initiate interventions, skin care algorithm/standards of care as needed  Outcome: Progressing  Goal: Incision(s), wounds(s) or drain site(s) healing without S/S of infection  Description: INTERVENTIONS:  - Assess and document risk factors for pressure ulcer development  - Assess and document skin integrity  - Assess and document dressing/incision, wound bed, drain sites and surrounding tissue  - Implement wound care per orders  - Initiate isolation precautions as appropriate  - Initiate Pressure Ulcer prevention bundle as indicated  Outcome: Progressing

## 2022-11-29 NOTE — PLAN OF CARE
No acute changes overnight. Norco PRN for pain management. Self care. MARYSOL to bulb suction continued. No c/o N/V. IV abx as ordered. Tolerating diet. Problem: Patient Centered Care  Goal: Patient preferences are identified and integrated in the patient's plan of care  Description: Interventions:  - What would you like us to know as we care for you?  I was just discharged yesterday  - Provide timely, complete, and accurate information to patient/family  - Incorporate patient and family knowledge, values, beliefs, and cultural backgrounds into the planning and delivery of care  - Encourage patient/family to participate in care and decision-making at the level they choose  - Honor patient and family perspectives and choices  Outcome: Progressing     Problem: Diabetes/Glucose Control  Goal: Glucose maintained within prescribed range  Description: INTERVENTIONS:  - Monitor Blood Glucose as ordered  - Assess for signs and symptoms of hyperglycemia and hypoglycemia  - Administer ordered medications to maintain glucose within target range  - Assess barriers to adequate nutritional intake and initiate nutrition consult as needed  - Instruct patient on self management of diabetes  Outcome: Progressing     Problem: Patient/Family Goals  Goal: Patient/Family Long Term Goal  Description: Patient's Long Term Goal: to go home    Interventions:  - take medications as prescribed  - follow MD orders  - monitor labs  - See additional Care Plan goals for specific interventions  Outcome: Progressing  Goal: Patient/Family Short Term Goal  Description: Patient's Short Term Goal: to report pain    Interventions:   -PRN pain medications  - testing  - monitor labs  - See additional Care Plan goals for specific interventions  Outcome: Progressing     Problem: GASTROINTESTINAL - ADULT  Goal: Minimal or absence of nausea and vomiting  Description: INTERVENTIONS:  - Maintain adequate hydration with IV or PO as ordered and tolerated  - Nasogastric tube to low intermittent suction as ordered  - Evaluate effectiveness of ordered antiemetic medications  - Provide nonpharmacologic comfort measures as appropriate  - Advance diet as tolerated, if ordered  - Obtain nutritional consult as needed  - Evaluate fluid balance  Outcome: Progressing  Goal: Maintains or returns to baseline bowel function  Description: INTERVENTIONS:  - Assess bowel function  - Maintain adequate hydration with IV or PO as ordered and tolerated  - Evaluate effectiveness of GI medications  - Encourage mobilization and activity  - Obtain nutritional consult as needed  - Establish a toileting routine/schedule  - Consider collaborating with pharmacy to review patient's medication profile  Outcome: Progressing     Problem: METABOLIC/FLUID AND ELECTROLYTES - ADULT  Goal: Glucose maintained within prescribed range  Description: INTERVENTIONS:  - Monitor Blood Glucose as ordered  - Assess for signs and symptoms of hyperglycemia and hypoglycemia  - Administer ordered medications to maintain glucose within target range  - Assess barriers to adequate nutritional intake and initiate nutrition consult as needed  - Instruct patient on self management of diabetes  Outcome: Progressing  Goal: Electrolytes maintained within normal limits  Description: INTERVENTIONS:  - Monitor labs and rhythm and assess patient for signs and symptoms of electrolyte imbalances  - Administer electrolyte replacement as ordered  - Monitor response to electrolyte replacements, including rhythm and repeat lab results as appropriate  - Fluid restriction as ordered  - Instruct patient on fluid and nutrition restrictions as appropriate  Outcome: Progressing     Problem: PAIN - ADULT  Goal: Verbalizes/displays adequate comfort level or patient's stated pain goal  Description: INTERVENTIONS:  - Encourage pt to monitor pain and request assistance  - Assess pain using appropriate pain scale  - Administer analgesics based on type and severity of pain and evaluate response  - Implement non-pharmacological measures as appropriate and evaluate response  - Consider cultural and social influences on pain and pain management  - Manage/alleviate anxiety  - Utilize distraction and/or relaxation techniques  - Monitor for opioid side effects  - Notify MD/LIP if interventions unsuccessful or patient reports new pain  - Anticipate increased pain with activity and pre-medicate as appropriate  Outcome: Progressing     Problem: SKIN/TISSUE INTEGRITY - ADULT  Goal: Skin integrity remains intact  Description: INTERVENTIONS  - Assess and document risk factors for pressure ulcer development  - Assess and document skin integrity  - Monitor for areas of redness and/or skin breakdown  - Initiate interventions, skin care algorithm/standards of care as needed  Outcome: Progressing  Goal: Incision(s), wounds(s) or drain site(s) healing without S/S of infection  Description: INTERVENTIONS:  - Assess and document risk factors for pressure ulcer development  - Assess and document skin integrity  - Assess and document dressing/incision, wound bed, drain sites and surrounding tissue  - Implement wound care per orders  - Initiate isolation precautions as appropriate  - Initiate Pressure Ulcer prevention bundle as indicated  Outcome: Progressing

## 2022-11-29 NOTE — PLAN OF CARE
Discharge and follow up instructions discussed with patient, patient verbalized understanding. [id number] [facility] home care discussed with patient, provided printed education sheets. Extra dressing provided. Dressing changed prior to discharge. Denies pain. Tolerating diet. Denies nausea or vomiting. Plan for PO course of antibiotics. Patient agreeable to discharge. Ambulated with this RN to main entrance. Problem: Patient Centered Care  Goal: Patient preferences are identified and integrated in the patient's plan of care  Description: Interventions:  - What would you like us to know as we care for you?  I was just discharged yesterday  - Provide timely, complete, and accurate information to patient/family  - Incorporate patient and family knowledge, values, beliefs, and cultural backgrounds into the planning and delivery of care  - Encourage patient/family to participate in care and decision-making at the level they choose  - Honor patient and family perspectives and choices  Outcome: Adequate for Discharge     Problem: Diabetes/Glucose Control  Goal: Glucose maintained within prescribed range  Description: INTERVENTIONS:  - Monitor Blood Glucose as ordered  - Assess for signs and symptoms of hyperglycemia and hypoglycemia  - Administer ordered medications to maintain glucose within target range  - Assess barriers to adequate nutritional intake and initiate nutrition consult as needed  - Instruct patient on self management of diabetes  Outcome: Adequate for Discharge     Problem: Patient/Family Goals  Goal: Patient/Family Long Term Goal  Description: Patient's Long Term Goal: to go home    Interventions:  - take medications as prescribed  - follow MD orders  - monitor labs  - See additional Care Plan goals for specific interventions  Outcome: Adequate for Discharge  Goal: Patient/Family Short Term Goal  Description: Patient's Short Term Goal: to report pain    Interventions:   -PRN pain medications  - testing  - monitor labs  - See additional Care Plan goals for specific interventions  Outcome: Adequate for Discharge     Problem: GASTROINTESTINAL - ADULT  Goal: Minimal or absence of nausea and vomiting  Description: INTERVENTIONS:  - Maintain adequate hydration with IV or PO as ordered and tolerated  - Nasogastric tube to low intermittent suction as ordered  - Evaluate effectiveness of ordered antiemetic medications  - Provide nonpharmacologic comfort measures as appropriate  - Advance diet as tolerated, if ordered  - Obtain nutritional consult as needed  - Evaluate fluid balance  Outcome: Adequate for Discharge  Goal: Maintains or returns to baseline bowel function  Description: INTERVENTIONS:  - Assess bowel function  - Maintain adequate hydration with IV or PO as ordered and tolerated  - Evaluate effectiveness of GI medications  - Encourage mobilization and activity  - Obtain nutritional consult as needed  - Establish a toileting routine/schedule  - Consider collaborating with pharmacy to review patient's medication profile  Outcome: Adequate for Discharge     Problem: METABOLIC/FLUID AND ELECTROLYTES - ADULT  Goal: Glucose maintained within prescribed range  Description: INTERVENTIONS:  - Monitor Blood Glucose as ordered  - Assess for signs and symptoms of hyperglycemia and hypoglycemia  - Administer ordered medications to maintain glucose within target range  - Assess barriers to adequate nutritional intake and initiate nutrition consult as needed  - Instruct patient on self management of diabetes  Outcome: Adequate for Discharge  Goal: Electrolytes maintained within normal limits  Description: INTERVENTIONS:  - Monitor labs and rhythm and assess patient for signs and symptoms of electrolyte imbalances  - Administer electrolyte replacement as ordered  - Monitor response to electrolyte replacements, including rhythm and repeat lab results as appropriate  - Fluid restriction as ordered  - Instruct patient on fluid and nutrition restrictions as appropriate  Outcome: Adequate for Discharge     Problem: PAIN - ADULT  Goal: Verbalizes/displays adequate comfort level or patient's stated pain goal  Description: INTERVENTIONS:  - Encourage pt to monitor pain and request assistance  - Assess pain using appropriate pain scale  - Administer analgesics based on type and severity of pain and evaluate response  - Implement non-pharmacological measures as appropriate and evaluate response  - Consider cultural and social influences on pain and pain management  - Manage/alleviate anxiety  - Utilize distraction and/or relaxation techniques  - Monitor for opioid side effects  - Notify MD/LIP if interventions unsuccessful or patient reports new pain  - Anticipate increased pain with activity and pre-medicate as appropriate  Outcome: Adequate for Discharge     Problem: SKIN/TISSUE INTEGRITY - ADULT  Goal: Skin integrity remains intact  Description: INTERVENTIONS  - Assess and document risk factors for pressure ulcer development  - Assess and document skin integrity  - Monitor for areas of redness and/or skin breakdown  - Initiate interventions, skin care algorithm/standards of care as needed  Outcome: Adequate for Discharge  Goal: Incision(s), wounds(s) or drain site(s) healing without S/S of infection  Description: INTERVENTIONS:  - Assess and document risk factors for pressure ulcer development  - Assess and document skin integrity  - Assess and document dressing/incision, wound bed, drain sites and surrounding tissue  - Implement wound care per orders  - Initiate isolation precautions as appropriate  - Initiate Pressure Ulcer prevention bundle as indicated  Outcome: Adequate for Discharge

## 2022-12-02 ENCOUNTER — TELEPHONE (OUTPATIENT)
Dept: SURGERY | Facility: CLINIC | Age: 42
End: 2022-12-02

## 2022-12-02 RX ORDER — HYDROCODONE BITARTRATE AND ACETAMINOPHEN 5; 325 MG/1; MG/1
1 TABLET ORAL EVERY 6 HOURS PRN
Qty: 30 TABLET | Refills: 0 | Status: SHIPPED | OUTPATIENT
Start: 2022-12-02

## 2022-12-08 ENCOUNTER — MED REC SCAN ONLY (OUTPATIENT)
Facility: CLINIC | Age: 42
End: 2022-12-08

## 2022-12-12 ENCOUNTER — TELEPHONE (OUTPATIENT)
Dept: INTERVENTIONAL RADIOLOGY/VASCULAR | Facility: HOSPITAL | Age: 42
End: 2022-12-12

## 2022-12-13 ENCOUNTER — LAB ENCOUNTER (OUTPATIENT)
Dept: LAB | Facility: HOSPITAL | Age: 42
End: 2022-12-13
Attending: RADIOLOGY
Payer: COMMERCIAL

## 2022-12-13 LAB — SARS-COV-2 RNA RESP QL NAA+PROBE: DETECTED

## 2022-12-14 ENCOUNTER — HOSPITAL ENCOUNTER (OUTPATIENT)
Dept: INTERVENTIONAL RADIOLOGY/VASCULAR | Facility: HOSPITAL | Age: 42
Discharge: HOME | End: 2022-12-14
Attending: RADIOLOGY | Admitting: RADIOLOGY
Payer: COMMERCIAL

## 2022-12-14 VITALS
TEMPERATURE: 99 F | DIASTOLIC BLOOD PRESSURE: 73 MMHG | SYSTOLIC BLOOD PRESSURE: 125 MMHG | WEIGHT: 151 LBS | HEART RATE: 90 BPM | BODY MASS INDEX: 20.45 KG/M2 | RESPIRATION RATE: 20 BRPM | OXYGEN SATURATION: 98 % | HEIGHT: 72 IN

## 2022-12-14 DIAGNOSIS — L02.91 ABSCESS: ICD-10-CM

## 2022-12-14 DIAGNOSIS — Z01.818 PRE-OP TESTING: Primary | ICD-10-CM

## 2022-12-14 LAB — GLUCOSE BLDC GLUCOMTR-MCNC: 308 MG/DL (ref 70–99)

## 2022-12-14 PROCEDURE — BW111ZZ FLUOROSCOPY OF ABDOMEN AND PELVIS USING LOW OSMOLAR CONTRAST: ICD-10-PCS | Performed by: RADIOLOGY

## 2022-12-14 PROCEDURE — 0WPJX0Z REMOVAL OF DRAINAGE DEVICE FROM PELVIC CAVITY, EXTERNAL APPROACH: ICD-10-PCS | Performed by: RADIOLOGY

## 2022-12-14 PROCEDURE — 82962 GLUCOSE BLOOD TEST: CPT

## 2022-12-14 PROCEDURE — 76080 X-RAY EXAM OF FISTULA: CPT

## 2022-12-14 PROCEDURE — 49424 ASSESS CYST CONTRAST INJECT: CPT

## 2022-12-14 NOTE — DISCHARGE INSTRUCTIONS
Pr-[id number]  4.2  [phone]     Patient Name:  Kelsie Lopez. Procedure:  Tube removal    Site Care: Remove your band-aid or dressing in 24 hours. Gently wash area with soap and water. Activity/Diet  No heavy lifting or strenuous activity for 48 hours. Drink plenty of fluids, unless you have otherwise been told to restrict your fluid intake. Do not drink alcohol for 24 hours. Do not drive,  operate heavy machinery, make important decisions or sign legal documents today. Medications:  Make no changes to your existing medications. Contact Interventional Radiology at [phone] if you have severe/unrelieved pain, fever, chills, dizziness/lightheadedness, or drainage/bleeding from your incision site.

## 2022-12-20 ENCOUNTER — OFFICE VISIT (OUTPATIENT)
Dept: SURGERY | Facility: CLINIC | Age: 42
End: 2022-12-20
Payer: COMMERCIAL

## 2022-12-20 DIAGNOSIS — Z09 POSTOPERATIVE EXAMINATION: Primary | ICD-10-CM

## 2022-12-20 PROCEDURE — 99024 POSTOP FOLLOW-UP VISIT: CPT | Performed by: SURGERY

## 2022-12-20 NOTE — PROGRESS NOTES
Patient presents with:  Post-Op: Pt here for Diagnostic laparoscopy, small bowel resection on 11/17/2022. Pt denies fever, dif. W/ bm's or urination. O:  VSS  GEN:  No acute distress  Abd:  Soft, NTND, incisions C/D/I    Path:  Reviewed w pt    Assessment   Postoperative examination  (primary encounter diagnosis)    Doing well sp Lap assisted small bowel resection. F/u prn.          Kayli Montez MD

## 2023-01-03 ENCOUNTER — TELEPHONE (OUTPATIENT)
Dept: SURGERY | Facility: CLINIC | Age: 43
End: 2023-01-03

## 2023-01-03 NOTE — TELEPHONE ENCOUNTER
Spoke with patient's wife and advised her to have him wear the binder at work, he is a . She states she sees some swelling on the right side of the abdomen, but it is not painful to him at all. She will ask him to wear the binder and call in a couple days with an update.

## 2023-02-02 ENCOUNTER — TELEPHONE (OUTPATIENT)
Dept: PEDIATRIC ENDOCRINOLOGY | Age: 43
End: 2023-02-02

## 2024-05-13 ENCOUNTER — TELEPHONE (OUTPATIENT)
Dept: INTERNAL MEDICINE CLINIC | Facility: CLINIC | Age: 44
End: 2024-05-13

## 2025-07-07 NOTE — CM/SW NOTE
Called by [facility], patient is out of network  [employer] with Md, not stable for transfer at this time    Hannah Brownlee, [address] [facility] , [address]  [phone] Last OV 4/16/25

## (undated) DEVICE — LAPAROSCOPIC ACCESS SYSTEM: Brand: ALEXIS LAPAROSCOPIC SYSTEM

## (undated) DEVICE — SUT PDS II 0 CTX Z990G

## (undated) DEVICE — GAMMEX® PI HYBRID SIZE 7.5, STERILE POWDER-FREE SURGICAL GLOVE, POLYISOPRENE AND NEOPRENE BLEND: Brand: GAMMEX

## (undated) DEVICE — SUT PDS II 2-0 SH Z317H

## (undated) DEVICE — DISPOSABLE GRASPER CARTRIDGE: Brand: DIRECT DRIVE REPOSABLE GRASPERS

## (undated) DEVICE — TROCAR: Brand: KII FIOS FIRST ENTRY

## (undated) DEVICE — 35 ML SYRINGE REGULAR TIP: Brand: MONOJECT

## (undated) DEVICE — TROCAR: Brand: KII® SLEEVE

## (undated) DEVICE — ECHELON FLEX POWERED PLUS ARTICULATING ENDOSCOPIC LINEAR CUTTER , 60MM: Brand: ECHELON FLEX

## (undated) DEVICE — 6 ML SYRINGE LUER-LOCK TIP: Brand: MONOJECT

## (undated) DEVICE — ENSEAL X1 TISSUE SEALER, CURVED JAW, 37 CM SHAFT LENGTH: Brand: ENSEAL

## (undated) DEVICE — CONMED SCOPE SAVER BITE BLOCK, 20X27 MM: Brand: SCOPE SAVER

## (undated) DEVICE — KIT ENDO ORCAPOD 160/180/190

## (undated) DEVICE — DISPOSABLE SUCTION/IRRIGATOR TUBE SET: Brand: AHTO

## (undated) DEVICE — ENDOPATH ECHELON ENDOSCOPIC LINEAR CUTTER RELOADS, WHITE, 60MM: Brand: ECHELON ENDOPATH

## (undated) DEVICE — LAP CHOLE: Brand: MEDLINE INDUSTRIES, INC.

## (undated) DEVICE — LINE MNTR ADLT SET O2 INTMD

## (undated) DEVICE — 3 ML SYRINGE LUER-LOCK TIP: Brand: MONOJECT

## (undated) DEVICE — SUT PDS II 4-0 SH Z315H

## (undated) DEVICE — FORCEP RADIAL JAW 4

## (undated) DEVICE — [HIGH FLOW INSUFFLATOR,  DO NOT USE IF PACKAGE IS DAMAGED,  KEEP DRY,  KEEP AWAY FROM SUNLIGHT,  PROTECT FROM HEAT AND RADIOACTIVE SOURCES.]: Brand: PNEUMOSURE

## (undated) DEVICE — SUT SILK 2-0 SA85H

## (undated) DEVICE — KIT CLEAN ENDOKIT 1.1OZ GOWNX2

## (undated) DEVICE — SOLUTION  .9 1000ML BTL

## (undated) DEVICE — SOLUTION  .9 3000ML

## (undated) DEVICE — ABDOMINAL BINDER: Brand: DEROYAL

## (undated) DEVICE — MEDI-VAC NON-CONDUCTIVE SUCTION TUBING 6MM X 1.8M (6FT.) L: Brand: CARDINAL HEALTH

## (undated) NOTE — ED AVS SNAPSHOT
[address] [facility]. MRN: [id number]    Department:  [facility] Emergency Department   Date of Visit:  7/6/2018           Disclosure     Insurance plans vary and the physician(s) referred by the ER may not be covered by your plan.  Please contact y tell this physician (or your personal doctor if your instructions are to return to your personal doctor) about any new or lasting problems. The primary care or specialist physician will see patients referred from the [facility] Emergency Department.  Salvadore Skiff

## (undated) NOTE — LETTER
12/20/2022          To Whom It May Concern:    Kelsie Lopez. may return to work Dec. 21st light   Duty:  No lifting over 15 lbs and no strenuous activity. He may return to regular duty effective Dec. 29th no restrictions. If you require additional information please contact our office.         Sincerely,        Danny Tierney MD          Document generated by:  NW

## (undated) NOTE — LETTER
[address] [place], [place]  Authorization for Surgical Operation and Procedure                                                                                           1. I hereby authorize Emily Means MD, my physician and his/her assistants (if applicable), which may include medical students, residents, and/or fellows, to perform the following surgical operation/ procedure and administer such anesthesia as may be determined necessary by my physician: Operation/Procedure name (s) Diagnostic laparoscopy on [address]   2. I recognize that during the surgical operation/procedure, unforeseen conditions may necessitate additional or different procedures than those listed above. I, therefore, further authorize and request that the above-named surgeon, assistants, or designees perform such procedures as are, in their judgment, necessary and desirable. 3.   My surgeon/physician has discussed prior to my surgery the potential benefits, risks and side effects of this procedure; the likelihood of achieving goals; and potential problems that might occur during recuperation. They also discussed reasonable alternatives to the procedure, including risks, benefits, and side effects related to the alternatives and risks related to not receiving this procedure. I have had all my questions answered and I acknowledge that no guarantee has been made as to the result that may be obtained. 4.   Should the need arise during my operation/procedure, which includes change of level of care prior to discharge, I also consent to the administration of blood and/or blood products. Further, I understand that despite careful testing and screening of blood or blood products by collecting agencies, I may still be subject to ill effects as a result of receiving a blood transfusion and/or blood products.   The following are some, but not all, of the potential risks that can occur: fever and allergic reactions, hemolytic reactions, transmission of diseases such as Hepatitis, AIDS and Cytomegalovirus (CMV) and fluid overload. In the event that I wish to have an autologous transfusion of my own blood, or a directed donor transfusion, I will discuss this with my physician. Check only if Refusing Blood or Blood Products  I understand refusal of blood or blood products as deemed necessary by my physician may have serious consequences to my condition to include possible death. I hereby assume responsibility for my refusal and release the hospital, its personnel, and my physicians from any responsibility for the consequences of my refusal.    o  Refuse   5. I authorize the use of any specimen, organs, tissues, body parts or foreign objects that may be removed from my body during the operation/procedure for diagnosis, research or teaching purposes and their subsequent disposal by hospital authorities. I also authorize the release of specimen test results and/or written reports to my treating physician on the hospital medical staff or other referring or consulting physicians involved in my care, at the discretion of the Pathologist or my treating physician. 6.   I consent to the photographing or videotaping of the operations or procedures to be performed, including appropriate portions of my body for medical, scientific, or educational purposes, provided my identity is not revealed by the pictures or by descriptive texts accompanying them. If the procedure has been photographed/videotaped, the surgeon will obtain the original picture, image, videotape or CD. The hospital will not be responsible for storage, release or maintenance of the picture, image, tape or CD.    7.   I consent to the presence of a  or observers in the operating room as deemed necessary by my physician or their designees.     8.   I recognize that in the event my procedure results in extended X-Ray/fluoroscopy time, I may develop a skin reaction. 9. If I have a Do Not Attempt Resuscitation (DNAR) order in place, that status will be suspended while in the operating room, procedural suite, and during the recovery period unless otherwise explicitly stated by me (or a person authorized to consent on my behalf). The surgeon or my attending physician will determine when the applicable recovery period ends for purposes of reinstating the DNAR order. 10. Patients having a sterilization procedure: I understand that if the procedure is successful the results will be permanent and it will therefore be impossible for me to inseminate, conceive, or bear children. I also understand that the procedure is intended to result in sterility, although the result has not been guaranteed. 11. I acknowledge that my physician has explained sedation/analgesia administration to me including the risk and benefits I consent to the administration of sedation/analgesia as may be necessary or desirable in the judgment of my physician. I CERTIFY THAT I HAVE READ AND FULLY UNDERSTAND THE ABOVE CONSENT TO OPERATION and/or OTHER PROCEDURE.     _________________________________________ _________________________________     ___________________________________  Signature of Patient     Signature of Responsible Person                   Printed Name of Responsible Person                              _________________________________________ ______________________________        ___________________________________  Signature of Witness         Date  Time         Relationship to Patient    STATEMENT OF PHYSICIAN My signature below affirms that prior to the time of the procedure; I have explained to the patient and/or his/her legal representative, the risks and benefits involved in the proposed treatment and any reasonable alternative to the proposed treatment.  I have also explained the risks and benefits involved in refusal of the proposed treatment and alternatives to the proposed treatment and have answered the patient's questions.  If I have a significant financial interest in a co-management agreement or a significant financial interest in any product or implant, or other significant relationship used in this procedure/surgery, I have disclosed this and had a discussion with my patient.     _______________________________________________________________ _____________________________  Anny Joyce of Physician)                                                                                         (Date)                                   (Time)  Patient Name: Sarmad Lima    : 10/19/1980   Printed: 2022      Medical Record #: [id number]                                              Page 1 of 1